# Patient Record
Sex: FEMALE | URBAN - METROPOLITAN AREA
[De-identification: names, ages, dates, MRNs, and addresses within clinical notes are randomized per-mention and may not be internally consistent; named-entity substitution may affect disease eponyms.]

---

## 2020-04-29 ENCOUNTER — EMERGENCY (EMERGENCY)
Facility: HOSPITAL | Age: 45
LOS: 1 days | Discharge: HOME | End: 2020-04-29
Attending: EMERGENCY MEDICINE | Admitting: EMERGENCY MEDICINE
Payer: MEDICAID

## 2020-04-29 VITALS
SYSTOLIC BLOOD PRESSURE: 121 MMHG | RESPIRATION RATE: 16 BRPM | DIASTOLIC BLOOD PRESSURE: 74 MMHG | OXYGEN SATURATION: 100 % | HEART RATE: 75 BPM

## 2020-04-29 VITALS
SYSTOLIC BLOOD PRESSURE: 145 MMHG | TEMPERATURE: 99 F | HEART RATE: 79 BPM | DIASTOLIC BLOOD PRESSURE: 54 MMHG | RESPIRATION RATE: 16 BRPM | OXYGEN SATURATION: 100 %

## 2020-04-29 LAB
ALBUMIN SERPL ELPH-MCNC: 4.1 G/DL — SIGNIFICANT CHANGE UP (ref 3.3–5)
ALP SERPL-CCNC: 56 U/L — SIGNIFICANT CHANGE UP (ref 40–120)
ALT FLD-CCNC: 32 U/L — SIGNIFICANT CHANGE UP (ref 4–33)
ANION GAP SERPL CALC-SCNC: 11 MMO/L — SIGNIFICANT CHANGE UP (ref 7–14)
APPEARANCE UR: CLEAR — SIGNIFICANT CHANGE UP
AST SERPL-CCNC: 24 U/L — SIGNIFICANT CHANGE UP (ref 4–32)
BACTERIA # UR AUTO: NEGATIVE — SIGNIFICANT CHANGE UP
BASOPHILS # BLD AUTO: 0.06 K/UL — SIGNIFICANT CHANGE UP (ref 0–0.2)
BASOPHILS NFR BLD AUTO: 0.7 % — SIGNIFICANT CHANGE UP (ref 0–2)
BILIRUB SERPL-MCNC: 0.3 MG/DL — SIGNIFICANT CHANGE UP (ref 0.2–1.2)
BILIRUB UR-MCNC: NEGATIVE — SIGNIFICANT CHANGE UP
BLOOD UR QL VISUAL: SIGNIFICANT CHANGE UP
BUN SERPL-MCNC: 16 MG/DL — SIGNIFICANT CHANGE UP (ref 7–23)
CALCIUM SERPL-MCNC: 9.2 MG/DL — SIGNIFICANT CHANGE UP (ref 8.4–10.5)
CHLORIDE SERPL-SCNC: 104 MMOL/L — SIGNIFICANT CHANGE UP (ref 98–107)
CO2 SERPL-SCNC: 23 MMOL/L — SIGNIFICANT CHANGE UP (ref 22–31)
COLOR SPEC: YELLOW — SIGNIFICANT CHANGE UP
CREAT SERPL-MCNC: 0.6 MG/DL — SIGNIFICANT CHANGE UP (ref 0.5–1.3)
EOSINOPHIL # BLD AUTO: 0.22 K/UL — SIGNIFICANT CHANGE UP (ref 0–0.5)
EOSINOPHIL NFR BLD AUTO: 2.6 % — SIGNIFICANT CHANGE UP (ref 0–6)
GLUCOSE SERPL-MCNC: 112 MG/DL — HIGH (ref 70–99)
GLUCOSE UR-MCNC: NEGATIVE — SIGNIFICANT CHANGE UP
HCG UR-SCNC: NEGATIVE — SIGNIFICANT CHANGE UP
HCT VFR BLD CALC: 39.2 % — SIGNIFICANT CHANGE UP (ref 34.5–45)
HGB BLD-MCNC: 12.9 G/DL — SIGNIFICANT CHANGE UP (ref 11.5–15.5)
HYALINE CASTS # UR AUTO: NEGATIVE — SIGNIFICANT CHANGE UP
IMM GRANULOCYTES NFR BLD AUTO: 0.4 % — SIGNIFICANT CHANGE UP (ref 0–1.5)
KETONES UR-MCNC: NEGATIVE — SIGNIFICANT CHANGE UP
LEUKOCYTE ESTERASE UR-ACNC: NEGATIVE — SIGNIFICANT CHANGE UP
LIDOCAIN IGE QN: 23.6 U/L — SIGNIFICANT CHANGE UP (ref 7–60)
LYMPHOCYTES # BLD AUTO: 2.19 K/UL — SIGNIFICANT CHANGE UP (ref 1–3.3)
LYMPHOCYTES # BLD AUTO: 26 % — SIGNIFICANT CHANGE UP (ref 13–44)
MCHC RBC-ENTMCNC: 28.5 PG — SIGNIFICANT CHANGE UP (ref 27–34)
MCHC RBC-ENTMCNC: 32.9 % — SIGNIFICANT CHANGE UP (ref 32–36)
MCV RBC AUTO: 86.5 FL — SIGNIFICANT CHANGE UP (ref 80–100)
MONOCYTES # BLD AUTO: 0.64 K/UL — SIGNIFICANT CHANGE UP (ref 0–0.9)
MONOCYTES NFR BLD AUTO: 7.6 % — SIGNIFICANT CHANGE UP (ref 2–14)
NEUTROPHILS # BLD AUTO: 5.27 K/UL — SIGNIFICANT CHANGE UP (ref 1.8–7.4)
NEUTROPHILS NFR BLD AUTO: 62.7 % — SIGNIFICANT CHANGE UP (ref 43–77)
NITRITE UR-MCNC: NEGATIVE — SIGNIFICANT CHANGE UP
NRBC # FLD: 0 K/UL — SIGNIFICANT CHANGE UP (ref 0–0)
PH UR: 6 — SIGNIFICANT CHANGE UP (ref 5–8)
PLATELET # BLD AUTO: 244 K/UL — SIGNIFICANT CHANGE UP (ref 150–400)
PMV BLD: 10.4 FL — SIGNIFICANT CHANGE UP (ref 7–13)
POTASSIUM SERPL-MCNC: 4.2 MMOL/L — SIGNIFICANT CHANGE UP (ref 3.5–5.3)
POTASSIUM SERPL-SCNC: 4.2 MMOL/L — SIGNIFICANT CHANGE UP (ref 3.5–5.3)
PROT SERPL-MCNC: 7.9 G/DL — SIGNIFICANT CHANGE UP (ref 6–8.3)
PROT UR-MCNC: 10 — SIGNIFICANT CHANGE UP
RBC # BLD: 4.53 M/UL — SIGNIFICANT CHANGE UP (ref 3.8–5.2)
RBC # FLD: 15.5 % — HIGH (ref 10.3–14.5)
RBC CASTS # UR COMP ASSIST: SIGNIFICANT CHANGE UP (ref 0–?)
SODIUM SERPL-SCNC: 138 MMOL/L — SIGNIFICANT CHANGE UP (ref 135–145)
SP GR SPEC: 1.02 — SIGNIFICANT CHANGE UP (ref 1–1.04)
SQUAMOUS # UR AUTO: SIGNIFICANT CHANGE UP
UROBILINOGEN FLD QL: NORMAL — SIGNIFICANT CHANGE UP
WBC # BLD: 8.41 K/UL — SIGNIFICANT CHANGE UP (ref 3.8–10.5)
WBC # FLD AUTO: 8.41 K/UL — SIGNIFICANT CHANGE UP (ref 3.8–10.5)
WBC UR QL: SIGNIFICANT CHANGE UP (ref 0–?)

## 2020-04-29 PROCEDURE — 74177 CT ABD & PELVIS W/CONTRAST: CPT | Mod: 26

## 2020-04-29 PROCEDURE — 99284 EMERGENCY DEPT VISIT MOD MDM: CPT

## 2020-04-29 RX ORDER — KETOROLAC TROMETHAMINE 30 MG/ML
15 SYRINGE (ML) INJECTION ONCE
Refills: 0 | Status: DISCONTINUED | OUTPATIENT
Start: 2020-04-29 | End: 2020-04-29

## 2020-04-29 RX ORDER — SODIUM CHLORIDE 9 MG/ML
1000 INJECTION INTRAMUSCULAR; INTRAVENOUS; SUBCUTANEOUS ONCE
Refills: 0 | Status: COMPLETED | OUTPATIENT
Start: 2020-04-29 | End: 2020-04-29

## 2020-04-29 RX ORDER — ONDANSETRON 8 MG/1
4 TABLET, FILM COATED ORAL ONCE
Refills: 0 | Status: COMPLETED | OUTPATIENT
Start: 2020-04-29 | End: 2020-04-29

## 2020-04-29 RX ADMIN — Medication 15 MILLIGRAM(S): at 11:11

## 2020-04-29 RX ADMIN — SODIUM CHLORIDE 1000 MILLILITER(S): 9 INJECTION INTRAMUSCULAR; INTRAVENOUS; SUBCUTANEOUS at 11:10

## 2020-04-29 RX ADMIN — ONDANSETRON 4 MILLIGRAM(S): 8 TABLET, FILM COATED ORAL at 11:11

## 2020-04-29 NOTE — ED ADULT NURSE NOTE - OBJECTIVE STATEMENT
patient c/o right flank pain since last Thursday, h/o a kidney stone in the past. Nausea and vomited this morning. States pain is 9\10.

## 2020-04-29 NOTE — ED PROVIDER NOTE - NSFOLLOWUPINSTRUCTIONS_ED_ALL_ED_FT
You were seen for back/abdominal pain.  You have kidney stones.    Please drink plenty of water as this will help with passing the stones.    Take ibuprofen (Motrin or Advil) 600mg (3 tablets) every 6 hours as needed for pain. Make sure you always take ibuprofen with food or milk.  You can also take tylenol 1000mg every 6-8 hours with the motrin as needed.    Increase your fluid intake, but avoid alcohol.     Return to this Emergency Department if you experience worsening pain, fever, or any other emergency.

## 2020-04-29 NOTE — ED PROVIDER NOTE - PHYSICAL EXAMINATION
Well appearing, well nourished, awake, alert, oriented to person, place, time/situation and in pain distress.    Airway patent    Eyes without scleral injection. No jaundice.    Strong pulse.     Respirations unlabored. Lungs CTA.    Abdomen soft, non-tender, no guarding.    Spine appears normal, range of motion is not limited, no muscle or joint tenderness. SLR neg. (no pain radiating down leg, but does cause back pain when lifting R leg or lowering L leg).    Alert and oriented, no gross motor or sensory deficits. 5/5 strength (B) LE.    Skin normal color for race, warm, dry and intact. No evidence of rash.    No SI/HI. Well appearing, well nourished, awake, alert, oriented to person, place, time/situation and in pain distress.    Airway patent    Eyes without scleral injection. No jaundice.    Strong pulse.     Respirations unlabored. Lungs CTA.    Abdomen soft, non-tender, no guarding. No CVA tenderness b/l.    Spine appears normal, range of motion is not limited, no muscle or joint tenderness. SLR neg. (no pain radiating down leg, but does cause back pain when lifting R leg or lowering L leg).    Alert and oriented, no gross motor or sensory deficits. 5/5 strength (B) LE.    Skin normal color for race, warm, dry and intact. No evidence of rash.    No SI/HI.

## 2020-04-29 NOTE — ED PROVIDER NOTE - PROGRESS NOTE DETAILS
Yosef PGY1 - Reassessed pt., who is feeling better.  Discussed results w/ pt., who understands them.  All other questions and concerns have been addressed.  Pt. will be discharged.

## 2020-04-29 NOTE — ED PROVIDER NOTE - ATTENDING CONTRIBUTION TO CARE
I performed a face-to-face evaluation of the patient and performed a history and physical examination. I agree with the history and physical examination.    Bernice: Severe R LBP x 1 wk, cloudy urine. DDx includes sciatica, pyelo (had recent UTI), muscle strain, renal stone (has h/o small stone). Plan: UA, labs, CT, pain control.

## 2020-04-29 NOTE — ED PROVIDER NOTE - CARE PLAN
Principal Discharge DX:	Back pain Principal Discharge DX:	Back pain  Secondary Diagnosis:	Nephrolithiasis

## 2020-04-29 NOTE — ED ADULT TRIAGE NOTE - CHIEF COMPLAINT QUOTE
Pt c/o R flank pain and diarrhea x1 week, today worsening and radiating to R pelvic area, also c/o cloudy urine, +nausea, no vomiting. No fevers/ dysuria/ cough/ SOB. Appears uncomfortable.

## 2020-04-29 NOTE — ED PROVIDER NOTE - CLINICAL SUMMARY MEDICAL DECISION MAKING FREE TEXT BOX
Bernice: Severe R LBP x 1 wk, cloudy urine. DDx includes sciatica, pyelo (had recent UTI), muscle strain, renal stone (has h/o small stone). Plan: UA, labs, CT, pain control.

## 2020-04-29 NOTE — ED PROVIDER NOTE - PATIENT PORTAL LINK FT
You can access the FollowMyHealth Patient Portal offered by [facility] by registering at the following website: [email or web address]. By joining [employer]’s FollowMyHealth portal, you will also be able to view your health information using other applications (apps) compatible with our system.

## 2020-04-29 NOTE — ED ADULT NURSE REASSESSMENT NOTE - NS ED NURSE REASSESS COMMENT FT1
Patient awake and alert, c/o right sided flank pain since last Thursday. IV  placed, labs sent, medicated as ordered, NS in fusing well, awaiting ct scan. Will continue to monitor.

## 2020-04-29 NOTE — ED PROVIDER NOTE - OBJECTIVE STATEMENT
Bernice: Constant, sharp, R lower back pain and diarrhea (non-bloody, yesterday and today) x 1 week, today worse and radiating to R pelvic area (not down leg). Cloudy urine, N, no V. No F. This started last week after stretching/straining her back. Needing wheelchair and help walking 2/2 pain (not weakness). No dysuria/cough/SOB. Treated for UTI in 2/20. Failing heating pads and Motrin 400 mg - 600 mg; gets more comfortable, but not able to walk well 2/2 pain. When pain was severe, had sweating.    Pt. has h/o ovarian cyst (removed, returned, removed again), and an asymptomatic kidney stone. This pain is not like the cyst pain.

## 2020-04-29 NOTE — ED ADULT NURSE NOTE - NSIMPLEMENTINTERV_GEN_ALL_ED
Implemented All Universal Safety Interventions:  [place] to call system. Call bell, personal items and telephone within reach. Instruct patient to call for assistance. Room bathroom lighting operational. Non-slip footwear when patient is off stretcher. Physically safe environment: no spills, clutter or unnecessary equipment. Stretcher in lowest position, wheels locked, appropriate side rails in place.

## 2020-04-30 LAB
CULTURE RESULTS: SIGNIFICANT CHANGE UP
SPECIMEN SOURCE: SIGNIFICANT CHANGE UP

## 2020-05-02 ENCOUNTER — EMERGENCY (EMERGENCY)
Facility: HOSPITAL | Age: 45
LOS: 1 days | Discharge: HOME | End: 2020-05-02
Attending: EMERGENCY MEDICINE | Admitting: EMERGENCY MEDICINE
Payer: MEDICAID

## 2020-05-02 VITALS
OXYGEN SATURATION: 100 % | DIASTOLIC BLOOD PRESSURE: 86 MMHG | RESPIRATION RATE: 16 BRPM | HEART RATE: 112 BPM | SYSTOLIC BLOOD PRESSURE: 117 MMHG | TEMPERATURE: 98 F

## 2020-05-02 LAB
ALBUMIN SERPL ELPH-MCNC: 4.3 G/DL — SIGNIFICANT CHANGE UP (ref 3.3–5)
ALP SERPL-CCNC: 54 U/L — SIGNIFICANT CHANGE UP (ref 40–120)
ALT FLD-CCNC: 29 U/L — SIGNIFICANT CHANGE UP (ref 4–33)
ANION GAP SERPL CALC-SCNC: 12 MMO/L — SIGNIFICANT CHANGE UP (ref 7–14)
APPEARANCE UR: CLEAR — SIGNIFICANT CHANGE UP
AST SERPL-CCNC: 21 U/L — SIGNIFICANT CHANGE UP (ref 4–32)
BASE EXCESS BLDV CALC-SCNC: -0.9 MMOL/L — SIGNIFICANT CHANGE UP
BASOPHILS # BLD AUTO: 0.07 K/UL — SIGNIFICANT CHANGE UP (ref 0–0.2)
BASOPHILS NFR BLD AUTO: 0.8 % — SIGNIFICANT CHANGE UP (ref 0–2)
BILIRUB SERPL-MCNC: 0.3 MG/DL — SIGNIFICANT CHANGE UP (ref 0.2–1.2)
BILIRUB UR-MCNC: NEGATIVE — SIGNIFICANT CHANGE UP
BLOOD GAS VENOUS - CREATININE: 0.7 MG/DL — SIGNIFICANT CHANGE UP (ref 0.5–1.3)
BLOOD UR QL VISUAL: SIGNIFICANT CHANGE UP
BUN SERPL-MCNC: 13 MG/DL — SIGNIFICANT CHANGE UP (ref 7–23)
CALCIUM SERPL-MCNC: 9.3 MG/DL — SIGNIFICANT CHANGE UP (ref 8.4–10.5)
CHLORIDE BLDV-SCNC: 110 MMOL/L — HIGH (ref 96–108)
CHLORIDE SERPL-SCNC: 103 MMOL/L — SIGNIFICANT CHANGE UP (ref 98–107)
CO2 SERPL-SCNC: 21 MMOL/L — LOW (ref 22–31)
COLOR SPEC: SIGNIFICANT CHANGE UP
CREAT SERPL-MCNC: 0.62 MG/DL — SIGNIFICANT CHANGE UP (ref 0.5–1.3)
CRP SERPL-MCNC: < 4 MG/L — SIGNIFICANT CHANGE UP
EOSINOPHIL # BLD AUTO: 0.38 K/UL — SIGNIFICANT CHANGE UP (ref 0–0.5)
EOSINOPHIL NFR BLD AUTO: 4.4 % — SIGNIFICANT CHANGE UP (ref 0–6)
ERYTHROCYTE [SEDIMENTATION RATE] IN BLOOD: 10 MM/HR — SIGNIFICANT CHANGE UP (ref 4–25)
ERYTHROCYTE [SEDIMENTATION RATE] IN BLOOD: 11 MM/HR — SIGNIFICANT CHANGE UP (ref 4–25)
GAS PNL BLDV: 137 MMOL/L — SIGNIFICANT CHANGE UP (ref 136–146)
GLUCOSE BLDV-MCNC: 95 MG/DL — SIGNIFICANT CHANGE UP (ref 70–99)
GLUCOSE SERPL-MCNC: 96 MG/DL — SIGNIFICANT CHANGE UP (ref 70–99)
GLUCOSE UR-MCNC: NEGATIVE — SIGNIFICANT CHANGE UP
HCO3 BLDV-SCNC: 22 MMOL/L — SIGNIFICANT CHANGE UP (ref 20–27)
HCT VFR BLD CALC: 39.3 % — SIGNIFICANT CHANGE UP (ref 34.5–45)
HCT VFR BLDV CALC: 41.7 % — SIGNIFICANT CHANGE UP (ref 34.5–45)
HGB BLD-MCNC: 12.9 G/DL — SIGNIFICANT CHANGE UP (ref 11.5–15.5)
HGB BLDV-MCNC: 13.6 G/DL — SIGNIFICANT CHANGE UP (ref 11.5–15.5)
IMM GRANULOCYTES NFR BLD AUTO: 0.2 % — SIGNIFICANT CHANGE UP (ref 0–1.5)
KETONES UR-MCNC: NEGATIVE — SIGNIFICANT CHANGE UP
LACTATE BLDV-MCNC: 1.8 MMOL/L — SIGNIFICANT CHANGE UP (ref 0.5–2)
LEUKOCYTE ESTERASE UR-ACNC: NEGATIVE — SIGNIFICANT CHANGE UP
LIDOCAIN IGE QN: 26.7 U/L — SIGNIFICANT CHANGE UP (ref 7–60)
LYMPHOCYTES # BLD AUTO: 3.02 K/UL — SIGNIFICANT CHANGE UP (ref 1–3.3)
LYMPHOCYTES # BLD AUTO: 35.2 % — SIGNIFICANT CHANGE UP (ref 13–44)
MCHC RBC-ENTMCNC: 28.7 PG — SIGNIFICANT CHANGE UP (ref 27–34)
MCHC RBC-ENTMCNC: 32.8 % — SIGNIFICANT CHANGE UP (ref 32–36)
MCV RBC AUTO: 87.5 FL — SIGNIFICANT CHANGE UP (ref 80–100)
MONOCYTES # BLD AUTO: 0.77 K/UL — SIGNIFICANT CHANGE UP (ref 0–0.9)
MONOCYTES NFR BLD AUTO: 9 % — SIGNIFICANT CHANGE UP (ref 2–14)
NEUTROPHILS # BLD AUTO: 4.31 K/UL — SIGNIFICANT CHANGE UP (ref 1.8–7.4)
NEUTROPHILS NFR BLD AUTO: 50.4 % — SIGNIFICANT CHANGE UP (ref 43–77)
NITRITE UR-MCNC: NEGATIVE — SIGNIFICANT CHANGE UP
NRBC # FLD: 0 K/UL — SIGNIFICANT CHANGE UP (ref 0–0)
PCO2 BLDV: 50 MMHG — SIGNIFICANT CHANGE UP (ref 41–51)
PH BLDV: 7.31 PH — LOW (ref 7.32–7.43)
PH UR: 6 — SIGNIFICANT CHANGE UP (ref 5–8)
PLATELET # BLD AUTO: 276 K/UL — SIGNIFICANT CHANGE UP (ref 150–400)
PMV BLD: 11.1 FL — SIGNIFICANT CHANGE UP (ref 7–13)
PO2 BLDV: 31 MMHG — LOW (ref 35–40)
POTASSIUM BLDV-SCNC: 3.6 MMOL/L — SIGNIFICANT CHANGE UP (ref 3.4–4.5)
POTASSIUM SERPL-MCNC: 3.6 MMOL/L — SIGNIFICANT CHANGE UP (ref 3.5–5.3)
POTASSIUM SERPL-SCNC: 3.6 MMOL/L — SIGNIFICANT CHANGE UP (ref 3.5–5.3)
PROT SERPL-MCNC: 8.1 G/DL — SIGNIFICANT CHANGE UP (ref 6–8.3)
PROT UR-MCNC: NEGATIVE — SIGNIFICANT CHANGE UP
RBC # BLD: 4.49 M/UL — SIGNIFICANT CHANGE UP (ref 3.8–5.2)
RBC # FLD: 15.4 % — HIGH (ref 10.3–14.5)
SAO2 % BLDV: 48.8 % — LOW (ref 60–85)
SODIUM SERPL-SCNC: 136 MMOL/L — SIGNIFICANT CHANGE UP (ref 135–145)
SP GR SPEC: 1.02 — SIGNIFICANT CHANGE UP (ref 1–1.04)
UROBILINOGEN FLD QL: NORMAL — SIGNIFICANT CHANGE UP
WBC # BLD: 8.57 K/UL — SIGNIFICANT CHANGE UP (ref 3.8–10.5)
WBC # FLD AUTO: 8.57 K/UL — SIGNIFICANT CHANGE UP (ref 3.8–10.5)

## 2020-05-02 PROCEDURE — 99285 EMERGENCY DEPT VISIT HI MDM: CPT

## 2020-05-02 RX ORDER — LIDOCAINE 4 G/100G
1 CREAM TOPICAL ONCE
Refills: 0 | Status: COMPLETED | OUTPATIENT
Start: 2020-05-02 | End: 2020-05-02

## 2020-05-02 RX ORDER — SODIUM CHLORIDE 9 MG/ML
1000 INJECTION INTRAMUSCULAR; INTRAVENOUS; SUBCUTANEOUS ONCE
Refills: 0 | Status: COMPLETED | OUTPATIENT
Start: 2020-05-02 | End: 2020-05-02

## 2020-05-02 RX ORDER — KETOROLAC TROMETHAMINE 30 MG/ML
30 SYRINGE (ML) INJECTION ONCE
Refills: 0 | Status: DISCONTINUED | OUTPATIENT
Start: 2020-05-02 | End: 2020-05-02

## 2020-05-02 RX ORDER — CYCLOBENZAPRINE HYDROCHLORIDE 10 MG/1
1 TABLET, FILM COATED ORAL
Qty: 15 | Refills: 0
Start: 2020-05-02 | End: 2020-05-06

## 2020-05-02 RX ORDER — OXYCODONE AND ACETAMINOPHEN 5; 325 MG/1; MG/1
1 TABLET ORAL ONCE
Refills: 0 | Status: DISCONTINUED | OUTPATIENT
Start: 2020-05-02 | End: 2020-05-02

## 2020-05-02 RX ORDER — IBUPROFEN 200 MG
1 TABLET ORAL
Qty: 15 | Refills: 0
Start: 2020-05-02 | End: 2020-05-06

## 2020-05-02 RX ORDER — MORPHINE SULFATE 50 MG/1
4 CAPSULE, EXTENDED RELEASE ORAL ONCE
Refills: 0 | Status: DISCONTINUED | OUTPATIENT
Start: 2020-05-02 | End: 2020-05-02

## 2020-05-02 RX ORDER — OXYCODONE AND ACETAMINOPHEN 5; 325 MG/1; MG/1
1 TABLET ORAL
Qty: 12 | Refills: 0
Start: 2020-05-02 | End: 2020-05-04

## 2020-05-02 RX ORDER — DEXAMETHASONE 0.5 MG/5ML
10 ELIXIR ORAL ONCE
Refills: 0 | Status: COMPLETED | OUTPATIENT
Start: 2020-05-02 | End: 2020-05-02

## 2020-05-02 RX ADMIN — OXYCODONE AND ACETAMINOPHEN 1 TABLET(S): 5; 325 TABLET ORAL at 14:32

## 2020-05-02 RX ADMIN — Medication 30 MILLIGRAM(S): at 12:31

## 2020-05-02 RX ADMIN — LIDOCAINE 1 PATCH: 4 CREAM TOPICAL at 13:22

## 2020-05-02 RX ADMIN — Medication 102 MILLIGRAM(S): at 14:53

## 2020-05-02 RX ADMIN — SODIUM CHLORIDE 1000 MILLILITER(S): 9 INJECTION INTRAMUSCULAR; INTRAVENOUS; SUBCUTANEOUS at 12:31

## 2020-05-02 RX ADMIN — MORPHINE SULFATE 4 MILLIGRAM(S): 50 CAPSULE, EXTENDED RELEASE ORAL at 12:31

## 2020-05-02 NOTE — ED PROVIDER NOTE - PATIENT PORTAL LINK FT
You can access the FollowMyHealth Patient Portal offered by [facility] by registering at the following website: [email or web address]. By joining [employer]’s FollowMyHealth portal, you will also be able to view your health information using other applications (apps) compatible with our system.

## 2020-05-02 NOTE — ED PROVIDER NOTE - CARDIAC, MLM
[facility]  *** FINAL REPORT ***    Name: Lynnette Ferro  MRN: [id number]    Outpatient  : 20 Aug 1952  HIS Order #: [id number]  [id number] [facility] Visit #: [id number]  Date: 2018    TYPE OF TEST: Visceral Arterial Duplex    REASON FOR TEST  Eval for renal vascular HTN    Aortic PSV:  66.0 cm/s  Diameter AP: 2.0 cm   TV: 2.0 cm                   Right          Left  Renal Artery:- -------------  -------------  Proximal  PSV:  77.0           67.0  Mid       PSV: 120.0           65.0  Distal    PSV:  75.0          106.0  Aortic ratio :   1.8            1.6    Medullary PSV:  40.9           43.8            EDV:  12.8           13.6            EDR:   0.3            0.3            SDR:   3.2            3.2    Cortical  PSV:  31.7           17.6            EDV:  10.2            6.7            EDR:   0.3            0.4            SDR:   3.1            2.6  Stenosis:      Normal         Normal  Kidney size:   10.8 cm        10.4 cm               x  5.2 cm      x  4.2 cm    Hilar:-        Right          Left  Acc. Time  AT:  40 secs        30 secs  Acc. Index AI:             RI: 0.68           0.69    INTERPRETATION/FINDINGS  Duplex images were obtained using 2-D gray scale, color flow and  spectral doppler analysis. RENAL:  1. No significant renal artery stenosis identified, both renal  arteries and veins visualized. 2. The right kidney measures 10.8 x 5.2 x 5.2cm  3. The left kidney measures 10.4x 4.2 x 4.2  cm. 4.  Non-obstructive right lower pole renal calculi  5. Atheromatous disease of the Abdominal Aorta. ADDITIONAL COMMENTS    I have personally reviewed the data relevant to the interpretation of  this  study. TECHNOLOGIST: Eva Haider  Signed: 2018 01:19 PM    PHYSICIAN: Nima England.  Bob Armijo MD  Signed: 2018 01:46 PM Normal rate, regular rhythm.  Heart sounds S1, S2.  No murmurs, rubs or gallops.

## 2020-05-02 NOTE — ED PROVIDER NOTE - CLINICAL SUMMARY MEDICAL DECISION MAKING FREE TEXT BOX
46 y/o F with hx of kidney stones presenting with flank pain. Will obtain US kidneys, labs, UA, pain control and reassess.

## 2020-05-02 NOTE — ED PROVIDER NOTE - CARE PLAN
Principal Discharge DX:	Kidney stones Principal Discharge DX:	Low back pain  Secondary Diagnosis:	Kidney stones

## 2020-05-02 NOTE — ED PROVIDER NOTE - OBJECTIVE STATEMENT
44 y/o F with hx of kidney stones presenting to the ED c/o worsening right sided flank pain. Took Tylenol and Ibuprofen 8:30am this morning with no relief. Pt is tearful and c/o constant sharp non-radiating pain. Denies fever, chills, n/v/d, bloody stools.

## 2020-05-02 NOTE — ED POST DISCHARGE NOTE - DETAILS
Spoke with pt who is c/o worsening flank pain despite taking ibuprofen/tylenol. Pt informed ucx and need to return to ED for evaluation ( repeat ucx) and treatment. Pt expresses understanding of this information and states will return to ED now Spoke with pt who is c/o worsening flank pain despite taking ibuprofen/tylenol. Pt informed ucx results and need to return to ED for evaluation ( repeat ucx) and treatment. Pt expresses understanding of this information and states will return to ED now

## 2020-05-02 NOTE — ED PROVIDER NOTE - SKIN, MLM
POST-OP DIAGNOSIS:  Fracture of shaft of fifth metacarpal bone of right hand 12-Jul-2019 08:35:46  Jie Davila Skin normal color for race, warm, dry and intact. No evidence of rash.

## 2020-05-02 NOTE — ED ADULT NURSE NOTE - OBJECTIVE STATEMENT
a&ox4, came in c/o rt side flank pain radiating to rt side abd and rt groin.  pain meds give and pain went down to 6/10 from 10/10,  steady on her feet. resting in bed

## 2020-05-02 NOTE — ED PROVIDER NOTE - ATTENDING CONTRIBUTION TO CARE
I performed a face-to-face evaluation of the patient and performed a history and physical examination. I agree with the history and physical examination.    Seen here by me 3 days ago for abd. pain. Labs and CT w/u showed only (B) ~0.5 cm non-obstructing, intra-renal stones. UA unremarkable. UCx grew <10,000 CFU or normal genital moody. Still has pain. No F/V. Will repeat labs and UA. Pain control. I performed a face-to-face evaluation of the patient and performed a history and physical examination. I agree with the history and physical examination.    Seen here by me 3 days ago for R lower back pain radiating to RLQ. Labs and CT w/u showed only (B) ~0.5 cm non-obstructing, intra-renal stones. UA unremarkable. UCx grew <10,000 CFU or normal genital moody. Still has pain in that area, not likely explained by intra-renal stones, since her pain is lower and the stones are higher and non-obstructive. No F/V. This may be MSK back pain. Will repeat labs and UA. Pain control for back pain.

## 2020-05-02 NOTE — ED ADULT TRIAGE NOTE - CHIEF COMPLAINT QUOTE
pt seen thursday and returns due to kidney stone pain/ pt was told to return.  pt uncomfortable at triage   c/o rt side abd pain

## 2020-05-03 LAB
CULTURE RESULTS: SIGNIFICANT CHANGE UP
SPECIMEN SOURCE: SIGNIFICANT CHANGE UP

## 2020-05-03 RX ORDER — OXYCODONE AND ACETAMINOPHEN 5; 325 MG/1; MG/1
1 TABLET ORAL
Qty: 12 | Refills: 0
Start: 2020-05-03 | End: 2020-05-05

## 2020-05-03 NOTE — ED POST DISCHARGE NOTE - REASON FOR FOLLOW-UP
Patient called Rx for percocet not at pharmacy. Prescription writer RX for percocet failed. Re submitted to patient's pharmacy. Other

## 2020-07-01 ENCOUNTER — RESULT REVIEW (OUTPATIENT)
Age: 45
End: 2020-07-01

## 2020-07-01 PROBLEM — Z00.00 ENCOUNTER FOR PREVENTIVE HEALTH EXAMINATION: Status: ACTIVE | Noted: 2020-07-01

## 2020-07-01 PROBLEM — F41.9 ANXIETY: Status: ACTIVE | Noted: 2020-07-01

## 2020-07-01 RX ORDER — AMLODIPINE BESYLATE 5 MG/1
5 TABLET ORAL
Refills: 0 | Status: ACTIVE | COMMUNITY

## 2020-07-02 ENCOUNTER — APPOINTMENT (OUTPATIENT)
Dept: ENDOVASCULAR SURGERY | Facility: CLINIC | Age: 45
End: 2020-07-02
Payer: MEDICAID

## 2020-07-02 DIAGNOSIS — F41.9 ANXIETY DISORDER, UNSPECIFIED: ICD-10-CM

## 2020-07-02 PROCEDURE — 77001 FLUOROGUIDE FOR VEIN DEVICE: CPT

## 2020-07-02 PROCEDURE — 76937 US GUIDE VASCULAR ACCESS: CPT

## 2020-07-02 PROCEDURE — 36561 INSERT TUNNELED CV CATH: CPT

## 2020-07-02 NOTE — PROCEDURE
[D/C IV on discharge] : D/C IV on discharge [Resume diet] : resume diet [Site check for bleeding/hematoma] : Site check for bleeding/hematoma [Vital signs on admission the q 15 mins x2] : Vital signs on admission the q 15 mins x2 [FreeTextEntry1] : Mediport Placement [FreeTextEntry2] : chemotherapy

## 2020-07-02 NOTE — PAST MEDICAL HISTORY
[Increasing age ( >40 years old)] : Increasing age ( >40 years old) [Malignancy] : Malignancy [No therapy indicated for cases scheduled for less than one hour] : No therapy indicated for cases scheduled for less than one hour. [FreeTextEntry1] : \par \par Malignant Hyperthermis (MH) Screening Tool and Risk of Bleeding Assessement\par Ms. MARIA LUISA TANG  denies family history of unexpected death following Anesthesia or Exercise.\par Denies Family history of Malignant Hyperthermia, Muscle or Neuromuscular disorder and High Temperature following exercise.\par \par Ms. MARIA LUISA TANG denies history of Muscle Spasm, Dark or Chocolate - Colored urine and Unanticipated fever immediately following anesthesia or serious exercise. \par Ms. TANG  also denies bleeding tendencies/ Risks of Bleeding\par

## 2020-07-02 NOTE — ASSESSMENT
[FreeTextEntry1] : Pt seen and assessed for placement of port for venous access.  Chart reviewed, imaging and labs evaluated and acceptable for intervention. Consent obtained with risks, benefits explained.  Will place port with sedation.\par \par 6 [place]oruba powerport placed using Us and fluoro guidance - tip in svc.   EBL=minimal.  May use immediately- full report to follow\par

## 2020-08-18 ENCOUNTER — INPATIENT (INPATIENT)
Facility: HOSPITAL | Age: 45
LOS: 5 days | Discharge: HOME | DRG: 300 | End: 2020-08-24
Attending: STUDENT IN AN ORGANIZED HEALTH CARE EDUCATION/TRAINING PROGRAM | Admitting: HOSPITALIST
Payer: MEDICAID

## 2020-08-18 VITALS
HEIGHT: 63 IN | TEMPERATURE: 99 F | DIASTOLIC BLOOD PRESSURE: 69 MMHG | RESPIRATION RATE: 18 BRPM | OXYGEN SATURATION: 98 % | HEART RATE: 104 BPM | WEIGHT: 164.91 LBS | SYSTOLIC BLOOD PRESSURE: 110 MMHG

## 2020-08-18 LAB
ALBUMIN SERPL ELPH-MCNC: 4.4 G/DL — SIGNIFICANT CHANGE UP (ref 3.3–5)
ALP SERPL-CCNC: 117 U/L — SIGNIFICANT CHANGE UP (ref 40–120)
ALT FLD-CCNC: 11 U/L — SIGNIFICANT CHANGE UP (ref 10–45)
ANION GAP SERPL CALC-SCNC: 11 MMOL/L — SIGNIFICANT CHANGE UP (ref 5–17)
AST SERPL-CCNC: 16 U/L — SIGNIFICANT CHANGE UP (ref 10–40)
BASE EXCESS BLDV CALC-SCNC: 3.1 MMOL/L — HIGH (ref -2–2)
BASOPHILS # BLD AUTO: 0.21 K/UL — HIGH (ref 0–0.2)
BASOPHILS NFR BLD AUTO: 1 % — SIGNIFICANT CHANGE UP (ref 0–2)
BILIRUB SERPL-MCNC: 0.1 MG/DL — LOW (ref 0.2–1.2)
BUN SERPL-MCNC: 15 MG/DL — SIGNIFICANT CHANGE UP (ref 7–23)
CA-I SERPL-SCNC: 1.23 MMOL/L — SIGNIFICANT CHANGE UP (ref 1.12–1.3)
CALCIUM SERPL-MCNC: 9.8 MG/DL — SIGNIFICANT CHANGE UP (ref 8.4–10.5)
CHLORIDE BLDV-SCNC: 104 MMOL/L — SIGNIFICANT CHANGE UP (ref 96–108)
CHLORIDE SERPL-SCNC: 104 MMOL/L — SIGNIFICANT CHANGE UP (ref 96–108)
CO2 BLDV-SCNC: 30 MMOL/L — SIGNIFICANT CHANGE UP (ref 22–30)
CO2 SERPL-SCNC: 25 MMOL/L — SIGNIFICANT CHANGE UP (ref 22–31)
CREAT SERPL-MCNC: 0.55 MG/DL — SIGNIFICANT CHANGE UP (ref 0.5–1.3)
EOSINOPHIL # BLD AUTO: 0.21 K/UL — SIGNIFICANT CHANGE UP (ref 0–0.5)
EOSINOPHIL NFR BLD AUTO: 1 % — SIGNIFICANT CHANGE UP (ref 0–6)
GAS PNL BLDV: 142 MMOL/L — SIGNIFICANT CHANGE UP (ref 135–145)
GAS PNL BLDV: SIGNIFICANT CHANGE UP
GIANT PLATELETS BLD QL SMEAR: PRESENT — SIGNIFICANT CHANGE UP
GLUCOSE BLDV-MCNC: 105 MG/DL — HIGH (ref 70–99)
GLUCOSE SERPL-MCNC: 102 MG/DL — HIGH (ref 70–99)
HCG SERPL-ACNC: <2 MIU/ML — SIGNIFICANT CHANGE UP
HCO3 BLDV-SCNC: 28 MMOL/L — SIGNIFICANT CHANGE UP (ref 21–29)
HCT VFR BLD CALC: 34.5 % — SIGNIFICANT CHANGE UP (ref 34.5–45)
HCT VFR BLDA CALC: 34 % — LOW (ref 39–50)
HGB BLD CALC-MCNC: 11 G/DL — LOW (ref 11.5–15.5)
HGB BLD-MCNC: 11 G/DL — LOW (ref 11.5–15.5)
LACTATE BLDV-MCNC: 1.2 MMOL/L — SIGNIFICANT CHANGE UP (ref 0.7–2)
LG PLATELETS BLD QL AUTO: SLIGHT — SIGNIFICANT CHANGE UP
LYMPHOCYTES # BLD AUTO: 12 % — LOW (ref 13–44)
LYMPHOCYTES # BLD AUTO: 2.46 K/UL — SIGNIFICANT CHANGE UP (ref 1–3.3)
MANUAL SMEAR VERIFICATION: SIGNIFICANT CHANGE UP
MCHC RBC-ENTMCNC: 28.6 PG — SIGNIFICANT CHANGE UP (ref 27–34)
MCHC RBC-ENTMCNC: 31.9 GM/DL — LOW (ref 32–36)
MCV RBC AUTO: 89.8 FL — SIGNIFICANT CHANGE UP (ref 80–100)
METAMYELOCYTES # FLD: 4 % — HIGH (ref 0–0)
MONOCYTES # BLD AUTO: 2.67 K/UL — HIGH (ref 0–0.9)
MONOCYTES NFR BLD AUTO: 13 % — SIGNIFICANT CHANGE UP (ref 2–14)
MYELOCYTES NFR BLD: 2 % — HIGH (ref 0–0)
NEUTROPHILS # BLD AUTO: 12.92 K/UL — HIGH (ref 1.8–7.4)
NEUTROPHILS NFR BLD AUTO: 55 % — SIGNIFICANT CHANGE UP (ref 43–77)
NEUTS BAND # BLD: 8 % — SIGNIFICANT CHANGE UP (ref 0–8)
NRBC # BLD: 2 /100 — HIGH (ref 0–0)
PCO2 BLDV: 48 MMHG — SIGNIFICANT CHANGE UP (ref 35–50)
PH BLDV: 7.38 — SIGNIFICANT CHANGE UP (ref 7.35–7.45)
PLAT MORPH BLD: ABNORMAL
PLATELET # BLD AUTO: 296 K/UL — SIGNIFICANT CHANGE UP (ref 150–400)
PO2 BLDV: 29 MMHG — SIGNIFICANT CHANGE UP (ref 25–45)
POTASSIUM BLDV-SCNC: 3.4 MMOL/L — LOW (ref 3.5–5.3)
POTASSIUM SERPL-MCNC: 3.6 MMOL/L — SIGNIFICANT CHANGE UP (ref 3.5–5.3)
POTASSIUM SERPL-SCNC: 3.6 MMOL/L — SIGNIFICANT CHANGE UP (ref 3.5–5.3)
PROMYELOCYTES # FLD: 1 % — HIGH (ref 0–0)
PROT SERPL-MCNC: 7.9 G/DL — SIGNIFICANT CHANGE UP (ref 6–8.3)
RBC # BLD: 3.84 M/UL — SIGNIFICANT CHANGE UP (ref 3.8–5.2)
RBC # FLD: 15.7 % — HIGH (ref 10.3–14.5)
RBC BLD AUTO: SIGNIFICANT CHANGE UP
SAO2 % BLDV: 50 % — LOW (ref 67–88)
SODIUM SERPL-SCNC: 140 MMOL/L — SIGNIFICANT CHANGE UP (ref 135–145)
VARIANT LYMPHS # BLD: 3 % — SIGNIFICANT CHANGE UP (ref 0–6)
WBC # BLD: 20.5 K/UL — HIGH (ref 3.8–10.5)
WBC # FLD AUTO: 20.5 K/UL — HIGH (ref 3.8–10.5)

## 2020-08-18 PROCEDURE — 70491 CT SOFT TISSUE NECK W/DYE: CPT | Mod: 26

## 2020-08-18 PROCEDURE — 99284 EMERGENCY DEPT VISIT MOD MDM: CPT

## 2020-08-18 PROCEDURE — 71260 CT THORAX DX C+: CPT | Mod: 26

## 2020-08-18 RX ORDER — CEFTRIAXONE 500 MG/1
1000 INJECTION, POWDER, FOR SOLUTION INTRAMUSCULAR; INTRAVENOUS ONCE
Refills: 0 | Status: COMPLETED | OUTPATIENT
Start: 2020-08-18 | End: 2020-08-18

## 2020-08-18 RX ORDER — ONDANSETRON 8 MG/1
4 TABLET, FILM COATED ORAL ONCE
Refills: 0 | Status: COMPLETED | OUTPATIENT
Start: 2020-08-18 | End: 2020-08-18

## 2020-08-18 RX ORDER — KETOROLAC TROMETHAMINE 30 MG/ML
15 SYRINGE (ML) INJECTION ONCE
Refills: 0 | Status: DISCONTINUED | OUTPATIENT
Start: 2020-08-18 | End: 2020-08-18

## 2020-08-18 RX ORDER — MORPHINE SULFATE 50 MG/1
4 CAPSULE, EXTENDED RELEASE ORAL ONCE
Refills: 0 | Status: DISCONTINUED | OUTPATIENT
Start: 2020-08-18 | End: 2020-08-18

## 2020-08-18 RX ADMIN — Medication 15 MILLIGRAM(S): at 22:40

## 2020-08-18 RX ADMIN — Medication 15 MILLIGRAM(S): at 18:25

## 2020-08-18 RX ADMIN — MORPHINE SULFATE 4 MILLIGRAM(S): 50 CAPSULE, EXTENDED RELEASE ORAL at 22:42

## 2020-08-18 RX ADMIN — ONDANSETRON 4 MILLIGRAM(S): 8 TABLET, FILM COATED ORAL at 19:29

## 2020-08-18 NOTE — ED PROVIDER NOTE - PROGRESS NOTE DETAILS
Uri Rascon D.O., PGY2 (Resident)  Patient with wbc 20. vbg ordered for lactate. Escobar Cisse MD. vascular consulted Escobar Cisse MD. CTs noted. starting heparin, per vasc recs. also noting possible R lung PNA. starting ceftriaxone. accepted to hospitalist for admission.

## 2020-08-18 NOTE — ED ADULT NURSE REASSESSMENT NOTE - NS ED NURSE REASSESS COMMENT FT1
Report received from LEV [place] in purple. Pt observed sitting up in stretcher conversing with RN without difficulty. Breathing spontaneous and unlabored. Pt updated on plan of care awaiting CT scan. Pt reporting pain relief 4/10 on left side of neck. Left upper chest chemo port noted upon exam.  No acute distress noted. Call bell within reach.

## 2020-08-18 NOTE — CONSULT NOTE ADULT - SUBJECTIVE AND OBJECTIVE BOX
VASCULAR SURGERY CONSULT NOTE  --------------------------------------------------------------------------------------------    HPI:   Patient is a 45y old  Female who presents with a chief complaint of L neck pain    HPI:    45F with history of breast CA diagnosed in June who recently had mediport placement (L chest) and initiation of chemotherapy prior to resection presents with 5 days of left neck pain. Patient states pain was initially dull and she thought she had just slept poorly on her neck. However pain progressed and became shooting pain in her left neck and behind her ear worse with movement and mastication. Additionally, it gives her lots of pain when she throws up which is frequent b/c of the chemo. She informed her oncologist who told her to go to the ED to figure out the cause of the neck pain before they continued with chemotherapy. She has no prior or family history of clotting disorders. No recent trauma to her neck.    Patient denies fevers/chills, denies lightheadedness/dizziness, denies SOB/chest pain, endorses nausea/vomiting (chemo), denies constipation/diarrhea.      ROS: 10-system review is otherwise negative except HPI above.      PAST MEDICAL & SURGICAL HISTORY:  Breast cancer  No significant past surgical history    FAMILY HISTORY:  [x] Family history not pertinent as reviewed with the patient and family    ALLERGIES: No Known Allergies      HOME MEDICATIONS: chemotherapy    --------------------------------------------------------------------------------------------    Vitals:   T(C): 37.3 (08-18-20 @ 22:35), Max: 37.4 (08-18-20 @ 19:30)  HR: 80 (08-18-20 @ 22:35) (74 - 104)  BP: 140/91 (08-18-20 @ 22:35) (110/69 - 141/89)  RR: 18 (08-18-20 @ 22:35) (16 - 18)  SpO2: 98% (08-18-20 @ 22:35) (98% - 100%)  CAPILLARY BLOOD GLUCOSE          Height (cm): 160.02 (08-18 @ 15:50)  Weight (kg): 75.3 (08-18 @ 22:51)  BMI (kg/m2): 29.4 (08-18 @ 22:51)  BSA (m2): 1.79 (08-18 @ 22:51)    PHYSICAL EXAM:    General: tearful  Eyes: EOMI  ENT: airway patent, mild swelling of left neck, tender along SCM and posterior to ear  Cardiac: RRR, well perfused  Resp: non-labored breathing, no use of accessory muscles  Abd: soft, NT, ND, no guarding/rebound  Ext: spont moving all extremities    --------------------------------------------------------------------------------------------    LABS  CBC (08-18 @ 18:30)                              11.0<L>                         20.50<H>  )----------------(  296        55.0  % Neutrophils, 12.0<L>% Lymphocytes, ANC: 12.92<H>                              34.5      BMP (08-18 @ 18:30)             140     |  104     |  15    		Ca++ --      Ca 9.8                ---------------------------------( 102<H>		Mg --                 3.6     |  25      |  0.55  			Ph --        LFTs (08-18 @ 18:30)      TPro 7.9 / Alb 4.4 / TBili 0.1<L> / DBili -- / AST 16 / ALT 11 / AlkPhos 117          VBG (08-18 @ 19:38)     7.38 / 48 / 29 / 28 / 3.1<H> / 50<L>%     Lactate: 1.2    --------------------------------------------------------------------------------------------    IMAGING    CT NECK: Occlusion of left IJ from the base of the skull to the innominate vein - Prelim    --------------------------------------------------------------------------------------------

## 2020-08-18 NOTE — ED PROVIDER NOTE - ATTENDING CONTRIBUTION TO CARE
45y F hx of breast CA dx in June and subsequently started on chemo via L chest port here with L sided neck swelling for the past 4 days. Area is also painful to touch and when moves head or opens mouth. No fevers. No SOB. No sore throat but does endorse pain to neck when swallowing. No arm swelling. No CP. No ear pain. Exam w poor dentition, but no obvious dental infection. No uvular deviation, no peritonsillar fullness, no hot potato voice. FROM in neck but painful with R khanna turn. Fullness to palpation L anterior neck, no mastoid ttp. No palp LAD. No crepitus. Palp UE pulses. No facial fullness. Ddx includes but is not limited to: LAD, SVC syndrome, venous clot, oropharyngeal infection w reactive ln. Check labs, CT neck w IV. Pain control.

## 2020-08-18 NOTE — ED PROVIDER NOTE - CLINICAL SUMMARY MEDICAL DECISION MAKING FREE TEXT BOX
45 y.o. female here with left sided neck fullness, worse with jaw opening, and pain radiation to behind left ear. No recent infections. Vitals wnl. Exam with mild left sided neck fullnes. Patient immunocompromiosed 2/2 chemo. No on ppx abx. Concern for deeep space infection. Low suspicion for acute thrombus as patient w/o any upper extremity swelling. Will check labs, CT neck Iv contrast, analgesia, reassess.

## 2020-08-18 NOTE — ED ADULT NURSE NOTE - NSIMPLEMENTINTERV_GEN_ALL_ED
Implemented All Universal Safety Interventions:  [place] to call system. Call bell, personal items and telephone within reach. Instruct patient to call for assistance. Room bathroom lighting operational. Non-slip footwear when patient is off stretcher. Physically safe environment: no spills, clutter or unnecessary equipment. Stretcher in lowest position, wheels locked, appropriate side rails in place.

## 2020-08-18 NOTE — ED ADULT NURSE NOTE - OBJECTIVE STATEMENT
45 year old female presents to the ED c/o left neck pain since Friday.  Patient said pain and swelling began on left lateral neck and has gotten progressively worse since Friday moving to the posterior aspect of the neck.  Pain has been constant and is severe.  She took Motrin and Tylenol for pain with some relief of pain.  She is on chemo for breast ca and last had chemo around 8/6 and is due for chemo on Thursday.  She spoke with her oncologist and had routine labs done today via her left anterior chest wall port today.  She was told to come to the ED for further eval.  She denies recent injuries to mouth or neck, fevers, chest pain, shortness of breath, dental work.

## 2020-08-18 NOTE — ED PROVIDER NOTE - PHYSICAL EXAMINATION
GENERAL: middle aged female, lying in bed, NAD, speaking in clear full sentences. Vital signs are within normal limits  HEENT: NC/AT, conjunctiva noninjected, sclera anicteric, moist mucous membranes, oropharynx not erythematous, no exudate. TMI bilateral, no mastoid tenderness.  NECK: Supple, trachea midline. left sided neck fullness at base. No LAD.  LUNG: Nonlabored respirations  CV: RRR, Pulses- Radial: 2+ bilateral. Left sided chest wall chemo port, site c/d/i.  ABDOMEN: Nondistended, nontender  MSK: No visible deformities, no upper or lower extremity edema.  NEURO: AAOx4 (to person, place, time, event), no tremor, steady gait  PSYCH: Normal mood and affect

## 2020-08-18 NOTE — ED ADULT NURSE REASSESSMENT NOTE - NS ED NURSE REASSESS COMMENT FT1
CT Surgery resident at bedside. Pt reporting increase in L neck pain. To be medicated as per MD order. Actual weight to be recorded. Pt updated on plan of care and awaiting dispo. Pt is well appearing, speaking full sentences without difficulty. Breathing spontaneous and unlabored. Safety and comfort measures maintained. Pt has been ambulating with steady gait since arrival. Denies SOB/ CP, vision changes. Call bell within reach.

## 2020-08-18 NOTE — ED PROVIDER NOTE - NS ED ROS FT
CONSTITUTIONAL: No fevers, chills, fatigue, dizziness, weakness, unexpected weight change  HEENT: LEFT SIDED NECK FULLNESS. No loss of vision, double vision, blurry vision, nasal congestion, runny nose, sore throat  CV: No chest pain, palpitations  PULM: No cough, shortness of breath  GI: No abdominal pain, nausea, vomiting, diarrhea,   SKIN: No rashes, swelling  MSK: No muscle aches, joint aches  NEURO: no headache, paresthesias

## 2020-08-18 NOTE — ED PROVIDER NOTE - OBJECTIVE STATEMENT
45 y.o. female hx of breast cancer dx 06/2020 on chemo via left chest chemo port presents to the ED for left sided neck fullness x 4 days. Patient states fullness can be painful, sharp, radiating to behind the left ear. Pain worse with jaw opening. Tolerating PO. Denies fever, chills. tolerating secretions. Denies recent infections. Denies cough, shortness of breath, chest pain. No recent dental procedures. Tried ibuprofen with some relief.

## 2020-08-19 DIAGNOSIS — I10 ESSENTIAL (PRIMARY) HYPERTENSION: ICD-10-CM

## 2020-08-19 DIAGNOSIS — I82.C12 ACUTE EMBOLISM AND THROMBOSIS OF LEFT INTERNAL JUGULAR VEIN: ICD-10-CM

## 2020-08-19 DIAGNOSIS — C50.911 MALIGNANT NEOPLASM OF UNSPECIFIED SITE OF RIGHT FEMALE BREAST: ICD-10-CM

## 2020-08-19 DIAGNOSIS — A41.9 SEPSIS, UNSPECIFIED ORGANISM: ICD-10-CM

## 2020-08-19 DIAGNOSIS — R22.1 LOCALIZED SWELLING, MASS AND LUMP, NECK: ICD-10-CM

## 2020-08-19 DIAGNOSIS — E87.8 OTHER DISORDERS OF ELECTROLYTE AND FLUID BALANCE, NOT ELSEWHERE CLASSIFIED: ICD-10-CM

## 2020-08-19 DIAGNOSIS — D64.9 ANEMIA, UNSPECIFIED: ICD-10-CM

## 2020-08-19 DIAGNOSIS — R09.89 OTHER SPECIFIED SYMPTOMS AND SIGNS INVOLVING THE CIRCULATORY AND RESPIRATORY SYSTEMS: ICD-10-CM

## 2020-08-19 DIAGNOSIS — Z02.9 ENCOUNTER FOR ADMINISTRATIVE EXAMINATIONS, UNSPECIFIED: ICD-10-CM

## 2020-08-19 DIAGNOSIS — R91.8 OTHER NONSPECIFIC ABNORMAL FINDING OF LUNG FIELD: ICD-10-CM

## 2020-08-19 DIAGNOSIS — J39.2 OTHER DISEASES OF PHARYNX: ICD-10-CM

## 2020-08-19 DIAGNOSIS — I82.409 ACUTE EMBOLISM AND THROMBOSIS OF UNSPECIFIED DEEP VEINS OF UNSPECIFIED LOWER EXTREMITY: ICD-10-CM

## 2020-08-19 DIAGNOSIS — Z98.51 TUBAL LIGATION STATUS: Chronic | ICD-10-CM

## 2020-08-19 DIAGNOSIS — J18.9 PNEUMONIA, UNSPECIFIED ORGANISM: ICD-10-CM

## 2020-08-19 DIAGNOSIS — J39.0 RETROPHARYNGEAL AND PARAPHARYNGEAL ABSCESS: ICD-10-CM

## 2020-08-19 DIAGNOSIS — Z29.9 ENCOUNTER FOR PROPHYLACTIC MEASURES, UNSPECIFIED: ICD-10-CM

## 2020-08-19 LAB
ANION GAP SERPL CALC-SCNC: 10 MMOL/L — SIGNIFICANT CHANGE UP (ref 5–17)
ANION GAP SERPL CALC-SCNC: 8 MMOL/L — SIGNIFICANT CHANGE UP (ref 5–17)
APTT BLD: 28.6 SEC — SIGNIFICANT CHANGE UP (ref 27.5–35.5)
APTT BLD: 74.7 SEC — HIGH (ref 27.5–35.5)
APTT BLD: 75.3 SEC — HIGH (ref 27.5–35.5)
BLD GP AB SCN SERPL QL: NEGATIVE — SIGNIFICANT CHANGE UP
BUN SERPL-MCNC: 10 MG/DL — SIGNIFICANT CHANGE UP (ref 7–23)
BUN SERPL-MCNC: 6 MG/DL — LOW (ref 7–23)
CALCIUM SERPL-MCNC: 7.4 MG/DL — LOW (ref 8.4–10.5)
CALCIUM SERPL-MCNC: 9.3 MG/DL — SIGNIFICANT CHANGE UP (ref 8.4–10.5)
CHLORIDE SERPL-SCNC: 109 MMOL/L — HIGH (ref 96–108)
CHLORIDE SERPL-SCNC: 96 MMOL/L — SIGNIFICANT CHANGE UP (ref 96–108)
CO2 SERPL-SCNC: 23 MMOL/L — SIGNIFICANT CHANGE UP (ref 22–31)
CO2 SERPL-SCNC: 25 MMOL/L — SIGNIFICANT CHANGE UP (ref 22–31)
CREAT SERPL-MCNC: 0.46 MG/DL — LOW (ref 0.5–1.3)
CREAT SERPL-MCNC: 0.53 MG/DL — SIGNIFICANT CHANGE UP (ref 0.5–1.3)
FERRITIN SERPL-MCNC: 314 NG/ML — HIGH (ref 15–150)
GLUCOSE SERPL-MCNC: 117 MG/DL — HIGH (ref 70–99)
GLUCOSE SERPL-MCNC: 139 MG/DL — HIGH (ref 70–99)
HCT VFR BLD CALC: 24 % — LOW (ref 34.5–45)
HCT VFR BLD CALC: 24.9 % — LOW (ref 34.5–45)
HCT VFR BLD CALC: 29.7 % — LOW (ref 34.5–45)
HGB BLD-MCNC: 7.6 G/DL — LOW (ref 11.5–15.5)
HGB BLD-MCNC: 7.8 G/DL — LOW (ref 11.5–15.5)
HGB BLD-MCNC: 9.5 G/DL — LOW (ref 11.5–15.5)
INR BLD: 1.07 RATIO — SIGNIFICANT CHANGE UP (ref 0.88–1.16)
IRON SATN MFR SERPL: 11 % — LOW (ref 14–50)
IRON SATN MFR SERPL: 27 UG/DL — LOW (ref 30–160)
LEGIONELLA AG UR QL: NEGATIVE — SIGNIFICANT CHANGE UP
MAGNESIUM SERPL-MCNC: 1.5 MG/DL — LOW (ref 1.6–2.6)
MAGNESIUM SERPL-MCNC: 1.7 MG/DL — SIGNIFICANT CHANGE UP (ref 1.6–2.6)
MCHC RBC-ENTMCNC: 28.4 PG — SIGNIFICANT CHANGE UP (ref 27–34)
MCHC RBC-ENTMCNC: 28.5 PG — SIGNIFICANT CHANGE UP (ref 27–34)
MCHC RBC-ENTMCNC: 28.7 PG — SIGNIFICANT CHANGE UP (ref 27–34)
MCHC RBC-ENTMCNC: 31.3 GM/DL — LOW (ref 32–36)
MCHC RBC-ENTMCNC: 31.7 GM/DL — LOW (ref 32–36)
MCHC RBC-ENTMCNC: 32 GM/DL — SIGNIFICANT CHANGE UP (ref 32–36)
MCV RBC AUTO: 88.9 FL — SIGNIFICANT CHANGE UP (ref 80–100)
MCV RBC AUTO: 90.6 FL — SIGNIFICANT CHANGE UP (ref 80–100)
MCV RBC AUTO: 90.9 FL — SIGNIFICANT CHANGE UP (ref 80–100)
NRBC # BLD: 0 /100 WBCS — SIGNIFICANT CHANGE UP (ref 0–0)
PHOSPHATE SERPL-MCNC: 2.6 MG/DL — SIGNIFICANT CHANGE UP (ref 2.5–4.5)
PLATELET # BLD AUTO: 220 K/UL — SIGNIFICANT CHANGE UP (ref 150–400)
PLATELET # BLD AUTO: 222 K/UL — SIGNIFICANT CHANGE UP (ref 150–400)
PLATELET # BLD AUTO: 274 K/UL — SIGNIFICANT CHANGE UP (ref 150–400)
POTASSIUM SERPL-MCNC: 2.9 MMOL/L — CRITICAL LOW (ref 3.5–5.3)
POTASSIUM SERPL-MCNC: 3.4 MMOL/L — LOW (ref 3.5–5.3)
POTASSIUM SERPL-SCNC: 2.9 MMOL/L — CRITICAL LOW (ref 3.5–5.3)
POTASSIUM SERPL-SCNC: 3.4 MMOL/L — LOW (ref 3.5–5.3)
PROTHROM AB SERPL-ACNC: 12.7 SEC — SIGNIFICANT CHANGE UP (ref 10.6–13.6)
RBC # BLD: 2.65 M/UL — LOW (ref 3.8–5.2)
RBC # BLD: 2.74 M/UL — LOW (ref 3.8–5.2)
RBC # BLD: 3.34 M/UL — LOW (ref 3.8–5.2)
RBC # BLD: 3.4 M/UL — LOW (ref 3.8–5.2)
RBC # FLD: 15.4 % — HIGH (ref 10.3–14.5)
RBC # FLD: 15.4 % — HIGH (ref 10.3–14.5)
RBC # FLD: 15.5 % — HIGH (ref 10.3–14.5)
RETICS #: 66 K/UL — SIGNIFICANT CHANGE UP (ref 25–125)
RETICS/RBC NFR: 1.9 % — SIGNIFICANT CHANGE UP (ref 0.5–2.5)
RH IG SCN BLD-IMP: POSITIVE — SIGNIFICANT CHANGE UP
SARS-COV-2 IGG SERPL QL IA: NEGATIVE — SIGNIFICANT CHANGE UP
SARS-COV-2 IGM SERPL IA-ACNC: <0.1 INDEX — SIGNIFICANT CHANGE UP
SARS-COV-2 RNA SPEC QL NAA+PROBE: SIGNIFICANT CHANGE UP
SODIUM SERPL-SCNC: 131 MMOL/L — LOW (ref 135–145)
SODIUM SERPL-SCNC: 140 MMOL/L — SIGNIFICANT CHANGE UP (ref 135–145)
TIBC SERPL-MCNC: 248 UG/DL — SIGNIFICANT CHANGE UP (ref 220–430)
UIBC SERPL-MCNC: 221 UG/DL — SIGNIFICANT CHANGE UP (ref 110–370)
WBC # BLD: 16.97 K/UL — HIGH (ref 3.8–10.5)
WBC # BLD: 17.03 K/UL — HIGH (ref 3.8–10.5)
WBC # BLD: 22.44 K/UL — HIGH (ref 3.8–10.5)
WBC # FLD AUTO: 16.97 K/UL — HIGH (ref 3.8–10.5)
WBC # FLD AUTO: 17.03 K/UL — HIGH (ref 3.8–10.5)
WBC # FLD AUTO: 22.44 K/UL — HIGH (ref 3.8–10.5)

## 2020-08-19 PROCEDURE — 31575 DIAGNOSTIC LARYNGOSCOPY: CPT

## 2020-08-19 PROCEDURE — 99223 1ST HOSP IP/OBS HIGH 75: CPT

## 2020-08-19 PROCEDURE — 99222 1ST HOSP IP/OBS MODERATE 55: CPT | Mod: 25

## 2020-08-19 PROCEDURE — 93971 EXTREMITY STUDY: CPT | Mod: 26

## 2020-08-19 RX ORDER — AMLODIPINE BESYLATE 2.5 MG/1
5 TABLET ORAL DAILY
Refills: 0 | Status: DISCONTINUED | OUTPATIENT
Start: 2020-08-19 | End: 2020-08-24

## 2020-08-19 RX ORDER — OXYCODONE HYDROCHLORIDE 5 MG/1
5 TABLET ORAL EVERY 6 HOURS
Refills: 0 | Status: DISCONTINUED | OUTPATIENT
Start: 2020-08-19 | End: 2020-08-24

## 2020-08-19 RX ORDER — POTASSIUM CHLORIDE 20 MEQ
40 PACKET (EA) ORAL ONCE
Refills: 0 | Status: COMPLETED | OUTPATIENT
Start: 2020-08-19 | End: 2020-08-19

## 2020-08-19 RX ORDER — POTASSIUM CHLORIDE 20 MEQ
10 PACKET (EA) ORAL
Refills: 0 | Status: COMPLETED | OUTPATIENT
Start: 2020-08-19 | End: 2020-08-19

## 2020-08-19 RX ORDER — HEPARIN SODIUM 5000 [USP'U]/ML
3000 INJECTION INTRAVENOUS; SUBCUTANEOUS EVERY 6 HOURS
Refills: 0 | Status: DISCONTINUED | OUTPATIENT
Start: 2020-08-19 | End: 2020-08-21

## 2020-08-19 RX ORDER — PIPERACILLIN AND TAZOBACTAM 4; .5 G/20ML; G/20ML
3.38 INJECTION, POWDER, LYOPHILIZED, FOR SOLUTION INTRAVENOUS EVERY 8 HOURS
Refills: 0 | Status: DISCONTINUED | OUTPATIENT
Start: 2020-08-19 | End: 2020-08-19

## 2020-08-19 RX ORDER — ONDANSETRON 8 MG/1
4 TABLET, FILM COATED ORAL EVERY 6 HOURS
Refills: 0 | Status: DISCONTINUED | OUTPATIENT
Start: 2020-08-19 | End: 2020-08-24

## 2020-08-19 RX ORDER — PIPERACILLIN AND TAZOBACTAM 4; .5 G/20ML; G/20ML
3.38 INJECTION, POWDER, LYOPHILIZED, FOR SOLUTION INTRAVENOUS ONCE
Refills: 0 | Status: COMPLETED | OUTPATIENT
Start: 2020-08-19 | End: 2020-08-19

## 2020-08-19 RX ORDER — HEPARIN SODIUM 5000 [USP'U]/ML
INJECTION INTRAVENOUS; SUBCUTANEOUS
Qty: 25000 | Refills: 0 | Status: DISCONTINUED | OUTPATIENT
Start: 2020-08-19 | End: 2020-08-21

## 2020-08-19 RX ORDER — HEPARIN SODIUM 5000 [USP'U]/ML
6000 INJECTION INTRAVENOUS; SUBCUTANEOUS ONCE
Refills: 0 | Status: COMPLETED | OUTPATIENT
Start: 2020-08-19 | End: 2020-08-19

## 2020-08-19 RX ORDER — ACETAMINOPHEN 500 MG
650 TABLET ORAL EVERY 6 HOURS
Refills: 0 | Status: DISCONTINUED | OUTPATIENT
Start: 2020-08-19 | End: 2020-08-24

## 2020-08-19 RX ORDER — MORPHINE SULFATE 50 MG/1
4 CAPSULE, EXTENDED RELEASE ORAL EVERY 6 HOURS
Refills: 0 | Status: DISCONTINUED | OUTPATIENT
Start: 2020-08-19 | End: 2020-08-24

## 2020-08-19 RX ORDER — HEPARIN SODIUM 5000 [USP'U]/ML
6000 INJECTION INTRAVENOUS; SUBCUTANEOUS EVERY 6 HOURS
Refills: 0 | Status: DISCONTINUED | OUTPATIENT
Start: 2020-08-19 | End: 2020-08-21

## 2020-08-19 RX ORDER — SODIUM CHLORIDE 9 MG/ML
1000 INJECTION INTRAMUSCULAR; INTRAVENOUS; SUBCUTANEOUS
Refills: 0 | Status: COMPLETED | OUTPATIENT
Start: 2020-08-19 | End: 2020-08-19

## 2020-08-19 RX ADMIN — MORPHINE SULFATE 4 MILLIGRAM(S): 50 CAPSULE, EXTENDED RELEASE ORAL at 05:30

## 2020-08-19 RX ADMIN — OXYCODONE HYDROCHLORIDE 5 MILLIGRAM(S): 5 TABLET ORAL at 14:40

## 2020-08-19 RX ADMIN — OXYCODONE HYDROCHLORIDE 5 MILLIGRAM(S): 5 TABLET ORAL at 14:10

## 2020-08-19 RX ADMIN — MORPHINE SULFATE 4 MILLIGRAM(S): 50 CAPSULE, EXTENDED RELEASE ORAL at 20:40

## 2020-08-19 RX ADMIN — PIPERACILLIN AND TAZOBACTAM 200 GRAM(S): 4; .5 INJECTION, POWDER, LYOPHILIZED, FOR SOLUTION INTRAVENOUS at 02:06

## 2020-08-19 RX ADMIN — MORPHINE SULFATE 4 MILLIGRAM(S): 50 CAPSULE, EXTENDED RELEASE ORAL at 10:15

## 2020-08-19 RX ADMIN — HEPARIN SODIUM 1300 UNIT(S)/HR: 5000 INJECTION INTRAVENOUS; SUBCUTANEOUS at 00:32

## 2020-08-19 RX ADMIN — Medication 100 MILLIEQUIVALENT(S): at 07:44

## 2020-08-19 RX ADMIN — MORPHINE SULFATE 4 MILLIGRAM(S): 50 CAPSULE, EXTENDED RELEASE ORAL at 09:59

## 2020-08-19 RX ADMIN — SODIUM CHLORIDE 100 MILLILITER(S): 9 INJECTION INTRAMUSCULAR; INTRAVENOUS; SUBCUTANEOUS at 15:36

## 2020-08-19 RX ADMIN — Medication 100 MILLIEQUIVALENT(S): at 08:56

## 2020-08-19 RX ADMIN — HEPARIN SODIUM 1300 UNIT(S)/HR: 5000 INJECTION INTRAVENOUS; SUBCUTANEOUS at 07:00

## 2020-08-19 RX ADMIN — AMLODIPINE BESYLATE 5 MILLIGRAM(S): 2.5 TABLET ORAL at 04:59

## 2020-08-19 RX ADMIN — ONDANSETRON 4 MILLIGRAM(S): 8 TABLET, FILM COATED ORAL at 08:56

## 2020-08-19 RX ADMIN — ONDANSETRON 4 MILLIGRAM(S): 8 TABLET, FILM COATED ORAL at 16:19

## 2020-08-19 RX ADMIN — MORPHINE SULFATE 4 MILLIGRAM(S): 50 CAPSULE, EXTENDED RELEASE ORAL at 20:21

## 2020-08-19 RX ADMIN — HEPARIN SODIUM 6000 UNIT(S): 5000 INJECTION INTRAVENOUS; SUBCUTANEOUS at 00:31

## 2020-08-19 RX ADMIN — MORPHINE SULFATE 4 MILLIGRAM(S): 50 CAPSULE, EXTENDED RELEASE ORAL at 04:58

## 2020-08-19 RX ADMIN — SODIUM CHLORIDE 100 MILLILITER(S): 9 INJECTION INTRAMUSCULAR; INTRAVENOUS; SUBCUTANEOUS at 04:06

## 2020-08-19 RX ADMIN — MORPHINE SULFATE 4 MILLIGRAM(S): 50 CAPSULE, EXTENDED RELEASE ORAL at 01:15

## 2020-08-19 RX ADMIN — Medication 100 MILLIEQUIVALENT(S): at 10:00

## 2020-08-19 RX ADMIN — HEPARIN SODIUM 1300 UNIT(S)/HR: 5000 INJECTION INTRAVENOUS; SUBCUTANEOUS at 13:17

## 2020-08-19 RX ADMIN — OXYCODONE HYDROCHLORIDE 5 MILLIGRAM(S): 5 TABLET ORAL at 02:06

## 2020-08-19 RX ADMIN — SODIUM CHLORIDE 100 MILLILITER(S): 9 INJECTION INTRAMUSCULAR; INTRAVENOUS; SUBCUTANEOUS at 04:39

## 2020-08-19 RX ADMIN — PIPERACILLIN AND TAZOBACTAM 25 GRAM(S): 4; .5 INJECTION, POWDER, LYOPHILIZED, FOR SOLUTION INTRAVENOUS at 09:03

## 2020-08-19 NOTE — PROGRESS NOTE ADULT - ASSESSMENT
ASSESSMENT: Patient is a 45y old f with recently diagnosed breast CA (treated through mediport in L chest with chemotherapy) with provoked acute thrombus of Left IJ.    PLAN:   - continue heparin drip  - duplex of L neck/IJ  - vascular will follow   - no immediate surgery indicated  - Patient discussed with vascular fellow    Vascular Surgery #[id number]

## 2020-08-19 NOTE — H&P ADULT - PROBLEM SELECTOR PLAN 1
acute LIJ DVT extending along L mediport with surrounding retropharyngeal edema   treating with heparin gtt for now  vascular recs appreciated - IR consult in AM for catheter directed thrombolysis   pain control with oxycodone and morphine prn   likely in setting of port and hypercoagulable state 2/2 malignancy; low suspicion for Lemierre's disease

## 2020-08-19 NOTE — PROGRESS NOTE ADULT - SUBJECTIVE AND OBJECTIVE BOX
Vascular Surgery Progress Note    Interval:  No acute events overnight.    Subjective:  Patient seen and examined. Reports pain is well controlled. Denies N/V/f/c, SOB, or chest pain.    Vital Signs Last 24 Hrs  T(C): 37 (19 Aug 2020 04:40), Max: 37.4 (18 Aug 2020 19:30)  T(F): 98.6 (19 Aug 2020 04:40), Max: 99.3 (18 Aug 2020 19:30)  HR: 85 (19 Aug 2020 04:40) (74 - 104)  BP: 146/82 (19 Aug 2020 04:40) (110/69 - 146/82)  BP(mean): --  RR: 18 (19 Aug 2020 04:40) (16 - 18)  SpO2: 100% (19 Aug 2020 04:40) (98% - 100%)    Exam:  Gen: NAD, resting in bed, alert and responding appropriately  Resp: Airway patent, non-labored respirations  Abd: Soft, ND, NT, no rebound or guarding.   Neuro: AAOx3, no focal deficits  Extremities: L sided fullness and swelling, +tender on exam, +ROM of neck but with pain     I&O's Detail    18 Aug 2020 07:01  -  19 Aug 2020 07:00  --------------------------------------------------------  IN:    heparin  Infusion.: 39 mL    Oral Fluid: 120 mL    sodium chloride 0.9%.: 300 mL  Total IN: 459 mL    OUT:    Voided: 300 mL  Total OUT: 300 mL    Total NET: 159 mL      19 Aug 2020 07:01  -  19 Aug 2020 08:38  --------------------------------------------------------  IN:  Total IN: 0 mL    OUT:    Voided: 200 mL  Total OUT: 200 mL    Total NET: -200 mL          Daily Height in cm: 160.02 (18 Aug 2020 15:50)    Daily     MEDICATIONS  (STANDING):  amLODIPine   Tablet 5 milliGRAM(s) Oral daily  heparin  Infusion.  Unit(s)/Hr (13 mL/Hr) IV Continuous <Continuous>  piperacillin/tazobactam IVPB.. 3.375 Gram(s) IV Intermittent every 8 hours  potassium chloride  10 mEq/100 mL IVPB 10 milliEquivalent(s) IV Intermittent every 1 hour  sodium chloride 0.9%. 1000 milliLiter(s) (100 mL/Hr) IV Continuous <Continuous>    MEDICATIONS  (PRN):  acetaminophen   Tablet .. 650 milliGRAM(s) Oral every 6 hours PRN Temp greater or equal to 38C (100.4F), Mild Pain (1 - 3)  heparin   Injectable 6000 Unit(s) IV Push every 6 hours PRN For aPTT less than 40  heparin   Injectable 3000 Unit(s) IV Push every 6 hours PRN For aPTT between 40 - 57  morphine  - Injectable 4 milliGRAM(s) IV Push every 6 hours PRN Severe Pain (7 - 10)  ondansetron Injectable 4 milliGRAM(s) IV Push every 6 hours PRN Nausea and/or Vomiting  oxyCODONE    IR 5 milliGRAM(s) Oral every 6 hours PRN Moderate Pain (4 - 6)      LABS:                        7.8    16.97 )-----------( 220      ( 19 Aug 2020 06:35 )             24.9     08-19    140  |  109<H>  |  10  ----------------------------<  117<H>  2.9<LL>   |  23  |  0.46<L>    Ca    7.4<L>      19 Aug 2020 06:35  Phos  2.6     08-19  Mg     1.5     08-19    TPro  7.9  /  Alb  4.4  /  TBili  0.1<L>  /  DBili  x   /  AST  16  /  ALT  11  /  AlkPhos  117  08-18    PT/INR - ( 18 Aug 2020 23:38 )   PT: 12.7 sec;   INR: 1.07 ratio         PTT - ( 19 Aug 2020 06:35 )  PTT:74.7 sec

## 2020-08-19 NOTE — H&P ADULT - ASSESSMENT
45F with PMH of recently diagnosed R breast cancer (started on chemo Doxorubicin/cyclophosphamide in July, last session 8/6/20 via L chest wall mediport, Neulasta on 8/7/20) and HTN p/w L side neck pain and fullness x 5 days, found to have L IJ DVT extending along mediport with retropharyngeal edema and possible PNA.

## 2020-08-19 NOTE — CONSULT NOTE ADULT - PROBLEM SELECTOR RECOMMENDATION 9
- Retropharyngeal swelling and edema on C1-C7 level noted.  - Also extensive LIJ DVT noted.  - On hep gtt. No acute intervention planned per vasc surg or IR.  - No recent infection or travel. No recent illness.  - Does not appear infectious etiology. Will discuss among ID team - Retropharyngeal swelling and edema on C1-C7 level noted.  - Also extensive LIJ DVT noted.  - On hep gtt.  - No recent infection or travel. No recent illness.  - Does not appear infectious etiology. Recommend monitor off abx  - Avoid carotid massage

## 2020-08-19 NOTE — H&P ADULT - NSHPREVIEWOFSYSTEMS_GEN_ALL_CORE
REVIEW OF SYSTEMS:    CONSTITUTIONAL: No fevers, chills  EYES/ENT: No visual changes, +throat pain on swallowing   NECK: +L neck pain and swelling   RESPIRATORY: No cough, no shortness of breath  CARDIOVASCULAR: No chest pain or palpitations  GASTROINTESTINAL: +nausea, +vomiting, no abdominal pain, +diarrhea   GENITOURINARY: no hematuria, no dysuria  NEUROLOGICAL: no numbness, +headaches, no confusion   MUSCULOSKELETAL: no back pain, no focal weakness   SKIN: No rashes, or lesions   PSYCH: no anxiety, depression  HEME: no gum bleeding, no bruising

## 2020-08-19 NOTE — H&P ADULT - NSHPSOCIALHISTORY_GEN_ALL_CORE
former smoker (quit this past week, 0.5PPD x 20years), no etoh, +smokes marijuana 3x/week   lives at home with  and kids  currently unemployed, independent with ADLs

## 2020-08-19 NOTE — H&P ADULT - PROBLEM SELECTOR PROBLEM 5
Discharge planning issues Malignant neoplasm of right female breast, unspecified estrogen receptor status, unspecified site of breast

## 2020-08-19 NOTE — H&P ADULT - PROBLEM SELECTOR PLAN 4
recently diagnosed R breast cancer, last chemo with doxorubicin and cyclophosphamide on 8/6/20 (next due 8/20/20); s/p neulasta on 8/7/20 recently diagnosed R breast cancer, last chemo with doxorubicin and cyclophosphamide on 8/6/20 (next due 8/20/20); s/p neulasta on 8/7/20  f/u with oncology moderate retropharyngeal edema noted on CT scan - could be 2/2 DVT vs infections vs metastatic disease   currently no airway compromise, no stridor, not hypoxic; no preceding infections, no abscess noted on imaging  will treat DVT as above   treat with zosyn for possible infection   consider ID and ENT consult in AM

## 2020-08-19 NOTE — CONSULT NOTE ADULT - SUBJECTIVE AND OBJECTIVE BOX
**** INCOMPLETE NOTE UNTIL SIGNED BY ATTENDING ****      Patient is a 45y old  Female who presents with a chief complaint of L neck pain and fullness (19 Aug 2020 11:20)      HPI per H&P:  45F with PMH of recently diagnosed R breast cancer (started on chemo Doxorubicin/cyclophosphamide in July, last session 8/6/20 via L chest wall mediport, Neulasta on 8/7/20) and HTN p/w L side neck pain and fullness x 5 days. Pt states she started noticing some L neck fullness 5 days ago, followed by pain and then over the weekend noted significant swelling. Swelling and pain have been getting progressively worse since that time; pain is pulling sensation, 10/10 at its worst, sometimes radiates to the back of her head and L sided temple, worse with movement of her head (abena towards th R). Denies any associated fevers, chills, inability to handle secretions, no SOB, no wheezing, no stridor, no CP, no erythema/swelling/pain at L mediport site. Endorses nausea/vomiting x 2 episode this past weekend, without associated abdominal pain; +diarrhea since chemo. On day of admission, pt also started noticing some L sided pain on swallowing and pain was more severe than prior, prompting ED visit. Denies any recent sick contact or recent illness, denies any preceeding throat infections, has poor dental hygiene. (19 Aug 2020 01:27)    CTAP revealed long segment of occlusive LIJ DVT, which extends to the level of mediport. Moderate retropharyngeal edema extending from the C1 to C7 level. Edema tracking along the L carotid space and L SCM muscle to the lower neck/chest wall w/o abscess. 3 x 2 cm R lower lobe opacity suspicious for pneumonia/pulm mets noted. Patient was started on Zosyn. ID was consulted for neck swelling.      prior hospital charts reviewed [X]  primary team notes reviewed [X]  other consultant notes reviewed [X]    PAST MEDICAL & SURGICAL HISTORY:  Breast cancer  H/O tubal ligation      Allergies  No Known Allergies        ANTIMICROBIALS:  piperacillin/tazobactam IVPB.. 3.375 every 8 hours      OTHER MEDS: MEDICATIONS  (STANDING):  acetaminophen   Tablet .. 650 every 6 hours PRN  amLODIPine   Tablet 5 daily  heparin   Injectable 6000 every 6 hours PRN  heparin   Injectable 3000 every 6 hours PRN  heparin  Infusion.  <Continuous>  morphine  - Injectable 4 every 6 hours PRN  ondansetron Injectable 4 every 6 hours PRN  oxyCODONE    IR 5 every 6 hours PRN      SOCIAL HISTORY:  1/2 pack a day for 20 years; intermittent EtOH uses; uses marijuana    FAMILY HISTORY:  No pertinent family history in first degree relatives      REVIEW OF SYSTEMS  [  ] ROS unobtainable because:    [X] All other systems negative except as noted below:	    Constitutional:  [ ] fever [ ] chills  [ ] weight loss  [ ] weakness  Skin:  [ ] rash [ ] phlebitis	  Eyes: [ ] icterus [ ] pain  [ ] discharge	  ENMT: [ ] sore throat  [ ] thrush [ ] ulcers [ ] exudates [X] neck pain  Respiratory: [ ] dyspnea [ ] hemoptysis [ ] cough [ ] sputum	  Cardiovascular:  [ ] chest pain [ ] palpitations [ ] edema	  Gastrointestinal:  [X] nausea [X] vomiting [X] diarrhea [ ] constipation [X] post-prandial pain	  Genitourinary:  [ ] dysuria [ ] frequency [ ] hematuria [ ] discharge [ ] flank pain  [ ] incontinence  Musculoskeletal:  [ ] myalgias [ ] arthralgias [ ] arthritis  [ ] back pain  Neurological:  [ ] headache [ ] seizures  [ ] confusion/altered mental status  Psychiatric:  [ ] anxiety [ ] depression	  Hematology/Lymphatics:  [ ] lymphadenopathy  Endocrine:  [ ] adrenal [ ] thyroid  Allergic/Immunologic:	 [ ] transplant [ ] seasonal    Vital Signs Last 24 Hrs  T(F): 99.3 (08-19-20 @ 10:08), Max: 99.3 (08-18-20 @ 19:30)    Vital Signs Last 24 Hrs  HR: 83 (08-19-20 @ 10:08) (74 - 104)  BP: 131/78 (08-19-20 @ 10:08) (110/69 - 146/82)  RR: 18 (08-19-20 @ 10:08)  SpO2: 99% (08-19-20 @ 10:08) (98% - 100%)  Wt(kg): --    PHYSICAL EXAM:  Constitutional: non-toxic, no distress  HEAD/EYES: anicteric, no conjunctival injection  ENT: L neck swollen, nonerythematous, and exquisitely tender to light palpation; oropharynx difficult to visualize due to pain upon opening mouth  Cardiovascular:  normal S1, S2, no murmur, no edema  Chest: clear BS bilaterally, no wheezes, no rales; L upper chest with mediport nontender, nonerythematous.  GI: soft, non-tender, normal bowel sounds  : no chandler, no CVA tenderness  Musculoskeletal:  no synovitis, normal ROM  Neurologic: awake and alert, normal strength, no focal findings  Skin:  no rash, no erythema, no phlebitis  Heme/Onc: no lymphadenopathy   Psychiatric:  awake, alert, appropriate mood                 7.8    16.97 )-----------( 220      ( 19 Aug 2020 06:35 )             24.9       08-19    140  |  109<H>  |  10  ----------------------------<  117<H>  2.9<LL>   |  23  |  0.46<L>    Ca    7.4<L>      19 Aug 2020 06:35  Phos  2.6     08-19  Mg     1.5     08-19    TPro  7.9  /  Alb  4.4  /  TBili  0.1<L>  /  DBili  x   /  AST  16  /  ALT  11  /  AlkPhos  117  08-18          MICROBIOLOGY:          RADIOLOGY:    EXAM:  DUPLEX EXT VEINS UPPER LT                            PROCEDURE DATE:  08/19/2020    IMPRESSION:    There is acute left internal jugular and brachiocephalic vein DVT.    Findings discussed with LYNNETTE Murphy at 11:31 AM 8/19/2020      EXAM:  CT NECK SOFT TISSUE IC                          EXAM:  CT CHEST IC                            PROCEDURE DATE:  08/18/2020        IMPRESSION:  CT NECK:  1.  Long segment of occlusive deep venous thrombosis in the left internal jugular vein.  Inferiorly the thrombus slightly extends along the patient's medication port to the junction of the left internal jugular vein and left subclavian vein. The left brachiocephalic vein and SVC are grossly patent.  2.  Moderate retropharyngeal edema extending from the C1 level to the C7 level.  There is also edema tracking along the left carotid space and left sternocleidomastoid muscle to the lower neck/chest wall.  No discrete abscess.  3.  Nonspecific subcentimeter left-sided cervical lymph nodes.    CT CHEST: Approximately 3 x 2 cm right lower lobe opacity is suspicious for pneumonia however pulmonary metastasis is not excluded.  A follow-up chest CT is recommended in one month after antibiotic therapy to ensure resolution.    The major findings were discussed with Dr. Crocker 8/18/2020 10:01 PM by Dr. Mccallum with read back confirmation.            imaging below personally reviewed **** INCOMPLETE NOTE UNTIL SIGNED BY ATTENDING ****      Patient is a 45y old  Female who presents with a chief complaint of L neck pain and fullness (19 Aug 2020 11:20)      HPI per H&P:  45F with PMH of recently diagnosed R breast cancer (started on chemo Doxorubicin/cyclophosphamide in July, last session 8/6/20 via L chest wall mediport, Neulasta on 8/7/20) and HTN p/w L side neck pain and fullness x 5 days. Pt states she started noticing some L neck fullness 5 days ago, followed by pain and then over the weekend noted significant swelling. Swelling and pain have been getting progressively worse since that time; pain is pulling sensation, 10/10 at its worst, sometimes radiates to the back of her head and L sided temple, worse with movement of her head (abena towards th R). Denies any associated fevers, chills, inability to handle secretions, no SOB, no wheezing, no stridor, no CP, no erythema/swelling/pain at L mediport site. Endorses nausea/vomiting x 2 episode this past weekend, without associated abdominal pain; +diarrhea since chemo. On day of admission, pt also started noticing some L sided pain on swallowing and pain was more severe than prior, prompting ED visit. Denies any recent sick contact or recent illness, denies any preceeding throat infections, has poor dental hygiene. (19 Aug 2020 01:27)    CTAP revealed long segment of occlusive LIJ DVT, which extends to the level of mediport. Moderate retropharyngeal edema extending from the C1 to C7 level. Edema tracking along the L carotid space and L SCM muscle to the lower neck/chest wall w/o abscess. 3 x 2 cm R lower lobe opacity suspicious for pneumonia/pulm mets noted. Patient was started on Zosyn. ID was consulted for neck swelling.      prior hospital charts reviewed [X]  primary team notes reviewed [X]  other consultant notes reviewed [X]    PAST MEDICAL & SURGICAL HISTORY:  Breast cancer  H/O tubal ligation      Allergies  No Known Allergies        ANTIMICROBIALS:  piperacillin/tazobactam IVPB.. 3.375 every 8 hours      OTHER MEDS: MEDICATIONS  (STANDING):  acetaminophen   Tablet .. 650 every 6 hours PRN  amLODIPine   Tablet 5 daily  heparin   Injectable 6000 every 6 hours PRN  heparin   Injectable 3000 every 6 hours PRN  heparin  Infusion.  <Continuous>  morphine  - Injectable 4 every 6 hours PRN  ondansetron Injectable 4 every 6 hours PRN  oxyCODONE    IR 5 every 6 hours PRN      SOCIAL HISTORY:  1/2 pack a day for 20 years; intermittent EtOH uses; uses marijuana    FAMILY HISTORY:  No pertinent family history in first degree relatives      REVIEW OF SYSTEMS  [  ] ROS unobtainable because:    [X] All other systems negative except as noted below:	    Constitutional:  [ ] fever [ ] chills  [ ] weight loss  [ ] weakness  Skin:  [ ] rash [ ] phlebitis	  Eyes: [ ] icterus [ ] pain  [ ] discharge	  ENMT: [ ] sore throat  [ ] thrush [ ] ulcers [ ] exudates [X] neck pain  Respiratory: [ ] dyspnea [ ] hemoptysis [ ] cough [ ] sputum	  Cardiovascular:  [ ] chest pain [ ] palpitations [ ] edema	  Gastrointestinal:  [X] nausea [X] vomiting [X] diarrhea [ ] constipation [X] post-prandial pain	  Genitourinary:  [ ] dysuria [ ] frequency [ ] hematuria [ ] discharge [ ] flank pain  [ ] incontinence  Musculoskeletal:  [ ] myalgias [ ] arthralgias [ ] arthritis  [ ] back pain  Neurological:  [ ] headache [ ] seizures  [ ] confusion/altered mental status  Psychiatric:  [ ] anxiety [ ] depression	  Hematology/Lymphatics:  [ ] lymphadenopathy  Endocrine:  [ ] adrenal [ ] thyroid  Allergic/Immunologic:	 [ ] transplant [ ] seasonal    Vital Signs Last 24 Hrs  T(F): 99.3 (08-19-20 @ 10:08), Max: 99.3 (08-18-20 @ 19:30)    Vital Signs Last 24 Hrs  HR: 83 (08-19-20 @ 10:08) (74 - 104)  BP: 131/78 (08-19-20 @ 10:08) (110/69 - 146/82)  RR: 18 (08-19-20 @ 10:08)  SpO2: 99% (08-19-20 @ 10:08) (98% - 100%)  Wt(kg): --    PHYSICAL EXAM:  Constitutional: non-toxic, no distress  HEAD/EYES: anicteric, no conjunctival injection  ENT: L neck swollen, nonerythematous, and exquisitely tender to light palpation; oropharynx difficult to visualize due to pain upon opening mouth; neck supple  Cardiovascular:  normal S1, S2, no murmur, no edema  Chest: clear BS bilaterally, no wheezes, no rales; L upper chest with mediport nontender, nonerythematous.  GI: soft, non-tender, normal bowel sounds  : no chandler, no CVA tenderness  Musculoskeletal:  no synovitis, normal ROM  Neurologic: awake and alert, normal strength, no focal findings  Skin:  no rash, no erythema, no phlebitis  Heme/Onc: no lymphadenopathy   Psychiatric:  awake, alert, appropriate mood                 7.8    16.97 )-----------( 220      ( 19 Aug 2020 06:35 )             24.9       08-19    140  |  109<H>  |  10  ----------------------------<  117<H>  2.9<LL>   |  23  |  0.46<L>    Ca    7.4<L>      19 Aug 2020 06:35  Phos  2.6     08-19  Mg     1.5     08-19    TPro  7.9  /  Alb  4.4  /  TBili  0.1<L>  /  DBili  x   /  AST  16  /  ALT  11  /  AlkPhos  117  08-18          MICROBIOLOGY:          RADIOLOGY:    EXAM:  DUPLEX EXT VEINS UPPER LT                            PROCEDURE DATE:  08/19/2020    IMPRESSION:    There is acute left internal jugular and brachiocephalic vein DVT.    Findings discussed with LYNNETTE Murphy at 11:31 AM 8/19/2020      EXAM:  CT NECK SOFT TISSUE IC                          EXAM:  CT CHEST IC                            PROCEDURE DATE:  08/18/2020        IMPRESSION:  CT NECK:  1.  Long segment of occlusive deep venous thrombosis in the left internal jugular vein.  Inferiorly the thrombus slightly extends along the patient's medication port to the junction of the left internal jugular vein and left subclavian vein. The left brachiocephalic vein and SVC are grossly patent.  2.  Moderate retropharyngeal edema extending from the C1 level to the C7 level.  There is also edema tracking along the left carotid space and left sternocleidomastoid muscle to the lower neck/chest wall.  No discrete abscess.  3.  Nonspecific subcentimeter left-sided cervical lymph nodes.    CT CHEST: Approximately 3 x 2 cm right lower lobe opacity is suspicious for pneumonia however pulmonary metastasis is not excluded.  A follow-up chest CT is recommended in one month after antibiotic therapy to ensure resolution.    The major findings were discussed with Dr. Crocker 8/18/2020 10:01 PM by Dr. Mccallum with read back confirmation.            imaging below personally reviewed Patient is a 45y old  Female who presents with a chief complaint of L neck pain and fullness (19 Aug 2020 11:20)      HPI per H&P:  45F with PMH of recently diagnosed R breast cancer (started on chemo Doxorubicin/cyclophosphamide in July, last session 8/6/20 via L chest wall mediport, Neulasta on 8/7/20) and HTN p/w L side neck pain and fullness x 5 days. Pt states she started noticing some L neck fullness 5 days ago, followed by pain and then over the weekend noted significant swelling. Swelling and pain have been getting progressively worse since that time; pain is pulling sensation, 10/10 at its worst, sometimes radiates to the back of her head and L sided temple, worse with movement of her head (abena towards th R). Denies any associated fevers, chills, inability to handle secretions, no SOB, no wheezing, no stridor, no CP, no erythema/swelling/pain at L mediport site. Endorses nausea/vomiting x 2 episode this past weekend, without associated abdominal pain; +diarrhea since chemo. On day of admission, pt also started noticing some L sided pain on swallowing and pain was more severe than prior, prompting ED visit. Denies any recent sick contact or recent illness, denies any preceeding throat infections, has poor dental hygiene. (19 Aug 2020 01:27)    CTAP revealed long segment of occlusive LIJ DVT, which extends to the level of mediport. Moderate retropharyngeal edema extending from the C1 to C7 level. Edema tracking along the L carotid space and L SCM muscle to the lower neck/chest wall w/o abscess. 3 x 2 cm R lower lobe opacity suspicious for pneumonia/pulm mets noted. Patient was started on Zosyn. ID was consulted for neck swelling.      prior hospital charts reviewed [X]  primary team notes reviewed [X]  other consultant notes reviewed [X]    PAST MEDICAL & SURGICAL HISTORY:  Breast cancer  H/O tubal ligation      Allergies  No Known Allergies        ANTIMICROBIALS:  piperacillin/tazobactam IVPB.. 3.375 every 8 hours      OTHER MEDS: MEDICATIONS  (STANDING):  acetaminophen   Tablet .. 650 every 6 hours PRN  amLODIPine   Tablet 5 daily  heparin   Injectable 6000 every 6 hours PRN  heparin   Injectable 3000 every 6 hours PRN  heparin  Infusion.  <Continuous>  morphine  - Injectable 4 every 6 hours PRN  ondansetron Injectable 4 every 6 hours PRN  oxyCODONE    IR 5 every 6 hours PRN      SOCIAL HISTORY:  1/2 pack a day for 20 years; intermittent EtOH uses; uses marijuana    FAMILY HISTORY:  No pertinent family history in first degree relatives      REVIEW OF SYSTEMS  [  ] ROS unobtainable because:    [X] All other systems negative except as noted below:	    Constitutional:  [ ] fever [ ] chills  [ ] weight loss  [ ] weakness  Skin:  [ ] rash [ ] phlebitis	  Eyes: [ ] icterus [ ] pain  [ ] discharge	  ENMT: [ ] sore throat  [ ] thrush [ ] ulcers [ ] exudates [X] neck pain  Respiratory: [ ] dyspnea [ ] hemoptysis [ ] cough [ ] sputum	  Cardiovascular:  [ ] chest pain [ ] palpitations [ ] edema	  Gastrointestinal:  [X] nausea [X] vomiting [X] diarrhea [ ] constipation [X] post-prandial pain	  Genitourinary:  [ ] dysuria [ ] frequency [ ] hematuria [ ] discharge [ ] flank pain  [ ] incontinence  Musculoskeletal:  [ ] myalgias [ ] arthralgias [ ] arthritis  [ ] back pain  Neurological:  [ ] headache [ ] seizures  [ ] confusion/altered mental status  Psychiatric:  [ ] anxiety [ ] depression	  Hematology/Lymphatics:  [ ] lymphadenopathy  Endocrine:  [ ] adrenal [ ] thyroid  Allergic/Immunologic:	 [ ] transplant [ ] seasonal    Vital Signs Last 24 Hrs  T(F): 99.3 (08-19-20 @ 10:08), Max: 99.3 (08-18-20 @ 19:30)    Vital Signs Last 24 Hrs  HR: 83 (08-19-20 @ 10:08) (74 - 104)  BP: 131/78 (08-19-20 @ 10:08) (110/69 - 146/82)  RR: 18 (08-19-20 @ 10:08)  SpO2: 99% (08-19-20 @ 10:08) (98% - 100%)  Wt(kg): --    PHYSICAL EXAM:  Constitutional: non-toxic, no distress  HEAD/EYES: anicteric, no conjunctival injection  ENT: L neck swollen, nonerythematous, and exquisitely tender to light palpation; oropharynx difficult to visualize due to pain upon opening mouth; neck supple  Cardiovascular:  normal S1, S2, no murmur, no edema  Chest: clear BS bilaterally, no wheezes, no rales; L upper chest with mediport nontender, nonerythematous.  GI: soft, non-tender, normal bowel sounds  : no chandler, no CVA tenderness  Musculoskeletal:  no synovitis, normal ROM  Neurologic: awake and alert, normal strength, no focal findings  Skin:  no rash, no erythema, no phlebitis  Heme/Onc: no lymphadenopathy   Psychiatric:  awake, alert, appropriate mood                 7.8    16.97 )-----------( 220      ( 19 Aug 2020 06:35 )             24.9       08-19    140  |  109<H>  |  10  ----------------------------<  117<H>  2.9<LL>   |  23  |  0.46<L>    Ca    7.4<L>      19 Aug 2020 06:35  Phos  2.6     08-19  Mg     1.5     08-19    TPro  7.9  /  Alb  4.4  /  TBili  0.1<L>  /  DBili  x   /  AST  16  /  ALT  11  /  AlkPhos  117  08-18          MICROBIOLOGY:          RADIOLOGY:    EXAM:  DUPLEX EXT VEINS UPPER LT                            PROCEDURE DATE:  08/19/2020    IMPRESSION:    There is acute left internal jugular and brachiocephalic vein DVT.    Findings discussed with LYNNETTE Murphy at 11:31 AM 8/19/2020      EXAM:  CT NECK SOFT TISSUE IC                          EXAM:  CT CHEST IC                            PROCEDURE DATE:  08/18/2020        IMPRESSION:  CT NECK:  1.  Long segment of occlusive deep venous thrombosis in the left internal jugular vein.  Inferiorly the thrombus slightly extends along the patient's medication port to the junction of the left internal jugular vein and left subclavian vein. The left brachiocephalic vein and SVC are grossly patent.  2.  Moderate retropharyngeal edema extending from the C1 level to the C7 level.  There is also edema tracking along the left carotid space and left sternocleidomastoid muscle to the lower neck/chest wall.  No discrete abscess.  3.  Nonspecific subcentimeter left-sided cervical lymph nodes.    CT CHEST: Approximately 3 x 2 cm right lower lobe opacity is suspicious for pneumonia however pulmonary metastasis is not excluded.  A follow-up chest CT is recommended in one month after antibiotic therapy to ensure resolution.    The major findings were discussed with Dr. Crocker 8/18/2020 10:01 PM by Dr. Mccallum with read back confirmation.            imaging below personally reviewed HPI per H&P:  45F with PMH of recently diagnosed R breast cancer (started on chemo Doxorubicin/cyclophosphamide in July, last session 8/6/20 via L chest wall mediport, Neulasta on 8/7/20) and HTN p/w L side neck pain and fullness x 5 days. Pt states she started noticing some L neck fullness 5 days ago, followed by pain and then over the weekend noted significant swelling. Swelling and pain have been getting progressively worse since that time; pain is pulling sensation, 10/10 at its worst, sometimes radiates to the back of her head and L sided temple, worse with movement of her head (abena towards th R). Denies any associated fevers, chills, inability to handle secretions, no SOB, no wheezing, no stridor, no CP, no erythema/swelling/pain at L mediport site. Endorses nausea/vomiting x 2 episode this past weekend, without associated abdominal pain; +diarrhea since chemo. On day of admission, pt also started noticing some L sided pain on swallowing and pain was more severe than prior, prompting ED visit. Denies any recent sick contact or recent illness, denies any preceeding throat infections, has poor dental hygiene. (19 Aug 2020 01:27)    CTAP revealed long segment of occlusive LIJ DVT, which extends to the level of mediport. Moderate retropharyngeal edema extending from the C1 to C7 level. Edema tracking along the L carotid space and L SCM muscle to the lower neck/chest wall w/o abscess. 3 x 2 cm R lower lobe opacity suspicious for pneumonia/pulm mets noted. Patient was started on Zosyn. ID was consulted for neck swelling.      prior hospital charts reviewed [X]  primary team notes reviewed [X]  other consultant notes reviewed [X]    PAST MEDICAL & SURGICAL HISTORY:  Breast cancer  H/O tubal ligation      Allergies  No Known Allergies        ANTIMICROBIALS:  piperacillin/tazobactam IVPB.. 3.375 every 8 hours      OTHER MEDS: MEDICATIONS  (STANDING):  acetaminophen   Tablet .. 650 every 6 hours PRN  amLODIPine   Tablet 5 daily  heparin   Injectable 6000 every 6 hours PRN  heparin   Injectable 3000 every 6 hours PRN  heparin  Infusion.  <Continuous>  morphine  - Injectable 4 every 6 hours PRN  ondansetron Injectable 4 every 6 hours PRN  oxyCODONE    IR 5 every 6 hours PRN    SOCIAL HISTORY:  1/2 pack a day for 20 years; intermittent EtOH uses; uses marijuana    FAMILY HISTORY:  No pertinent family history in first degree relatives    REVIEW OF SYSTEMS  [  ] ROS unobtainable because:    [X] All other systems negative except as noted below:	    Constitutional:  [ ] fever [ ] chills  [ ] weight loss  [ ] weakness  Skin:  [ ] rash [ ] phlebitis	  Eyes: [ ] icterus [ ] pain  [ ] discharge	  ENMT: [ ] sore throat  [ ] thrush [ ] ulcers [ ] exudates [X] neck pain  Respiratory: [ ] dyspnea [ ] hemoptysis [ ] cough [ ] sputum	  Cardiovascular:  [ ] chest pain [ ] palpitations [ ] edema	  Gastrointestinal:  [X] nausea [X] vomiting [X] diarrhea [ ] constipation [X] post-prandial pain	  Genitourinary:  [ ] dysuria [ ] frequency [ ] hematuria [ ] discharge [ ] flank pain  [ ] incontinence  Musculoskeletal:  [ ] myalgias [ ] arthralgias [ ] arthritis  [ ] back pain  Neurological:  [ ] headache [ ] seizures  [ ] confusion/altered mental status  Psychiatric:  [ ] anxiety [ ] depression	  Hematology/Lymphatics:  [ ] lymphadenopathy  Endocrine:  [ ] adrenal [ ] thyroid  Allergic/Immunologic:	 [ ] transplant [ ] seasonal    Vital Signs Last 24 Hrs  T(F): 99.3 (08-19-20 @ 10:08), Max: 99.3 (08-18-20 @ 19:30)    Vital Signs Last 24 Hrs  HR: 83 (08-19-20 @ 10:08) (74 - 104)  BP: 131/78 (08-19-20 @ 10:08) (110/69 - 146/82)  RR: 18 (08-19-20 @ 10:08)  SpO2: 99% (08-19-20 @ 10:08) (98% - 100%)  Wt(kg): --    PHYSICAL EXAM:  Constitutional: non-toxic, no distress  HEAD/EYES: anicteric, no conjunctival injection  ENT: L neck swollen, nonerythematous, and exquisitely tender to light palpation; oropharynx difficult to visualize due to pain upon opening mouth; neck supple  Cardiovascular:  normal S1, S2, no murmur, no edema  Chest: clear BS bilaterally, no wheezes, no rales; L upper chest with mediport nontender, nonerythematous.  GI: soft, non-tender, normal bowel sounds  : no chandler, no CVA tenderness  Musculoskeletal:  no synovitis, normal ROM  Neurologic: awake and alert, normal strength, no focal findings  Skin:  no rash, no erythema, no phlebitis  Heme/Onc: no lymphadenopathy   Psychiatric:  awake, alert, appropriate mood                 7.8    16.97 )-----------( 220      ( 19 Aug 2020 06:35 )             24.9       08-19    140  |  109<H>  |  10  ----------------------------<  117<H>  2.9<LL>   |  23  |  0.46<L>    Ca    7.4<L>      19 Aug 2020 06:35  Phos  2.6     08-19  Mg     1.5     08-19    TPro  7.9  /  Alb  4.4  /  TBili  0.1<L>  /  DBili  x   /  AST  16  /  ALT  11  /  AlkPhos  117  08-18          MICROBIOLOGY:  Blood cultures in lab       RADIOLOGY:  Personally reviewed:  EXAM:  DUPLEX EXT VEINS UPPER LT                        PROCEDURE DATE:  08/19/2020    IMPRESSION:    There is acute left internal jugular and brachiocephalic vein DVT.    Findings discussed with LYNNETTE Murphy at 11:31 AM 8/19/2020      EXAM:  CT NECK SOFT TISSUE IC                        EXAM:  CT CHEST IC                        PROCEDURE DATE:  08/18/2020    IMPRESSION:  CT NECK:  1.  Long segment of occlusive deep venous thrombosis in the left internal jugular vein.  Inferiorly the thrombus slightly extends along the patient's medication port to the junction of the left internal jugular vein and left subclavian vein. The left brachiocephalic vein and SVC are grossly patent.  2.  Moderate retropharyngeal edema extending from the C1 level to the C7 level.  There is also edema tracking along the left carotid space and left sternocleidomastoid muscle to the lower neck/chest wall.  No discrete abscess.  3.  Nonspecific subcentimeter left-sided cervical lymph nodes.  CT CHEST: Approximately 3 x 2 cm right lower lobe opacity is suspicious for pneumonia however pulmonary metastasis is not excluded.  A follow-up chest CT is recommended in one month after antibiotic therapy to ensure resolution.

## 2020-08-19 NOTE — CONSULT NOTE ADULT - PROBLEM SELECTOR RECOMMENDATION 2
- 3x2 cm RLL opacity noted on CT chest. Differentials include infection and metastatic process.  - Intermittent coughs. Clear sputum. - 3x2 cm RLL opacity noted on CT chest. Differentials include infection and metastatic process.  - Intermittent coughs. Clear sputum.  - Discussed with radiology regarding the RLL 3x2 cm opacity, unlikely from pneumonia.  - Monitor off abx as above - 3x2 cm RLL opacity noted on CT chest. Differentials include infection and metastatic process   - Intermittent coughs. Clear sputum.  - Discussed with radiology regarding the RLL 3x2 cm opacity, unlikely from pneumonia and more likely PE with lung infarct  - Monitor off abx as above

## 2020-08-19 NOTE — H&P ADULT - PROBLEM SELECTOR PLAN 5
1.  Name of PCP: Dr Brendon Khan  2.  PCP Contacted on Admission: [ ] Y    [ ] N    3.  PCP contacted at Discharge: [ ] Y    [ ] N    [ ] N/A  4.  Post-Discharge Appointment Date and Location:  5.  Summary of Handoff given to PCP: recently diagnosed R breast cancer, last chemo with doxorubicin and cyclophosphamide on 8/6/20 (next due 8/20/20); s/p neulasta on 8/7/20  f/u with oncology

## 2020-08-19 NOTE — H&P ADULT - PROBLEM SELECTOR PROBLEM 4
Malignant neoplasm of right female breast, unspecified estrogen receptor status, unspecified site of breast Edema of pharynx

## 2020-08-19 NOTE — ED ADULT NURSE REASSESSMENT NOTE - NS ED NURSE REASSESS COMMENT FT1
PT RTM medicine at this time. Heparin infusion started following nomogram as per MD orders. Pt educated on signs of bleeding, verbalizes understanding of symptoms. Pt updated on plan of care awaiting bed assignment. Safety and comfort measures maintained. Call bell within reach.

## 2020-08-19 NOTE — CONSULT NOTE ADULT - SUBJECTIVE AND OBJECTIVE BOX
CC: L neck sweling    HPI: 45F with PMH of recently diagnosed R breast cancer (started on chemo Doxorubicin/cyclophosphamide in July, last session 8/6/20 via L chest wall mediport, Neulasta on 8/7/20) and HTN p/w L side neck pain and fullness x 5 days. Pt states she started noticing some L neck fullness 5 days ago, followed by pain and then over the weekend noted significant swelling. Swelling and pain have been getting progressively worse since that time; pain is pulling sensation, 10/10 at its worst, sometimes radiates to the back of her head and L sided temple, worse with movement of her head (abena towards th R). Denies any associated fevers, chills, inability to handle secretions, no SOB, no wheezing, no stridor, no CP, no erythema/swelling/pain at L mediport site. Endorses nausea/vomiting x 2 episode this past weekend, without associated abdominal pain; +diarrhea since chemo. On day of admission, pt also started noticing some L sided pain on swallowing and pain was more severe than prior, prompting ED visit. Denies any recent sick contact or recent illness, denies any preceeding throat infections, has poor dental hygiene.     ENT consulted for upper airway evaluation      PAST MEDICAL & SURGICAL HISTORY:  Breast cancer  H/O tubal ligation    Allergies    No Known Allergies    Intolerances      MEDICATIONS  (STANDING):  amLODIPine   Tablet 5 milliGRAM(s) Oral daily  heparin  Infusion.  Unit(s)/Hr (13 mL/Hr) IV Continuous <Continuous>    MEDICATIONS  (PRN):  acetaminophen   Tablet .. 650 milliGRAM(s) Oral every 6 hours PRN Temp greater or equal to 38C (100.4F), Mild Pain (1 - 3)  heparin   Injectable 6000 Unit(s) IV Push every 6 hours PRN For aPTT less than 40  heparin   Injectable 3000 Unit(s) IV Push every 6 hours PRN For aPTT between 40 - 57  morphine  - Injectable 4 milliGRAM(s) IV Push every 6 hours PRN Severe Pain (7 - 10)  ondansetron Injectable 4 milliGRAM(s) IV Push every 6 hours PRN Nausea and/or Vomiting  oxyCODONE    IR 5 milliGRAM(s) Oral every 6 hours PRN Moderate Pain (4 - 6)      Social History: former smoker (quit this past week, 0.5PPD x 20years), no etoh, +smokes marijuana 3x/week   	lives at home with  and kids  currently unemployed, independent with ADLs    Family history:   No pertinent family history in first degree relatives      ROS:   ENT: all negative except as noted in HPI   Skin: No itching, dryness, rash, changes to hair, or skin masses  CV: denies palpitations  Pulm: denies SOB, cough, hemoptysis  GI: denies change in appetite, indigestion, n/v  : denies pertinent urinary symptoms, urgency  Neuro: denies numbness/tingling, loss of sensation  Psych: denies anxiety  MS: denies muscle weakness, instability  Heme: denies easy bruising or bleeding  Endo: denies heat/cold intolerance, excessive sweating  Vascular: denies LE edema    Vital Signs Last 24 Hrs  T(C): 36.7 (19 Aug 2020 14:10), Max: 37.4 (18 Aug 2020 19:30)  T(F): 98 (19 Aug 2020 14:10), Max: 99.3 (18 Aug 2020 19:30)  HR: 82 (19 Aug 2020 14:10) (74 - 88)  BP: 132/82 (19 Aug 2020 14:10) (124/76 - 146/82)  BP(mean): --  RR: 18 (19 Aug 2020 14:10) (16 - 18)  SpO2: 95% (19 Aug 2020 14:10) (95% - 100%)                          7.8    16.97 )-----------( 220      ( 19 Aug 2020 06:35 )             24.9    08-19    140  |  109<H>  |  10  ----------------------------<  117<H>  2.9<LL>   |  23  |  0.46<L>    Ca    7.4<L>      19 Aug 2020 06:35  Phos  2.6     08-19  Mg     1.5     08-19    TPro  7.9  /  Alb  4.4  /  TBili  0.1<L>  /  DBili  x   /  AST  16  /  ALT  11  /  AlkPhos  117  08-18   PT/INR - ( 18 Aug 2020 23:38 )   PT: 12.7 sec;   INR: 1.07 ratio         PTT - ( 19 Aug 2020 12:42 )  PTT:75.3 sec    PHYSICAL EXAM:  Gen: NAD  Skin: No rashes, bruises, or lesions  Head: Normocephalic, Atraumatic  Face: no edema, erythema, or fluctuance. Parotid glands soft without mass  Eyes: no scleral injection  Nose: Nares bilaterally patent, no discharge  Mouth: No Stridor / Drooling / Trismus.  Mucosa moist, tongue/uvula midline, oropharynx clear  Neck: Flat, supple, no lymphadenopathy, trachea midline, no masses  Lymphatic: No lymphadenopathy  Resp: breathing easily, no stridor  CV: no peripheral edema/cyanosis  GI: nondistended   Peripheral vascular: no JVD or edema  Neuro: facial nerve intact, no facial droop    Fiberoptic Indirect laryngoscopy:  (Scope #2 used)  Reason for Laryngoscopy: L IJ thrombus, retropharyngeal edema    Patient was unable to cooperate with mirror.  Nasopharynx, oropharynx, and hypopharynx clear, no bleeding. Tongue base, vallecula, epiglottis, and subglottis appear normal. +L posterolateral pharyngeal fullness, non obstructing. No erythema, pooling of secretions, masses or lesions. Airway patent, no foreign body visualized. No glottic/supraglottic edema. True vocal cords, arytenoids, vestibular folds, ventricles, pyriform sinuses, and aryepiglottic folds appear normal bilaterally. Vocal cords mobile with good contact b/l.      IMAGING/ADDITIONAL STUDIES:   EXAM: CT NECK SOFT TISSUE IC     EXAM: CT CHEST IC       PROCEDURE DATE: 08/18/2020     INTERPRETATION: CLINICAL INFORMATION: History of breast cancer presenting with left-sided neck pain and fullness. Leukocytosis.     COMPARISON: None.     PROCEDURE:   1. CT of the neck soft tissues was performed with intravenous contrast. Sagittal and coronal reformats were generated.   2. CT of the Chest was performed with intravenous contrast. Sagittal and coronal reformats were generated   Intravenous contrast: 90 cc Omnipaque 300 were administered; 10 cc discarded.    FINDINGS:     CT NECK:     There is mild enlargement the adenoids. The epiglottis, supraglottic airway, larynx and upper trachea appear within normal limits. There is moderate retropharyngeal edema extending from C1 level to the C7 level. Edema tracks along the left carotid space and left submandibular muscle, and probably tracks along the strap muscles to the lower neck/chest wall.. There is diffuse enlargement and long segment of occlusive deep venous thrombosis within the left internal jugular vein.     There are nonspecific subcentimeter left sided lymph nodes measuring up to 7 mm short access at left level IIa (4:31), 6 mm short access at left level IIb (4:22) 6 x 5 mm at left level IV (4:53), and 8 x 4 mm at left level Vb (4:40).     The parotid glands, submandibular glands and thyroid appear unremarkable.     There is minimal mucosal thickening in the ethmoid air cells, sphenoid sinuses and maxillary sinuses without air-fluid level.     The visualized tympanomastoid cavities are grossly clear.     There is straightening of the cervical lordosis with slight reversal at C5-C6. At C5-C6, there is moderate to severe loss of intervertebral disc space height, a small disc osteophyte complex and bilateral uncovertebral hypertrophy associated with mild spinal canal stenosis, moderate left neural foraminal narrowing and mild right neural foraminal narrowing.     CT CHEST:     LUNGS AND AIRWAYS: Patent central airways. There is an approximately 3 x 2 cm opacity in the right lower lobe (2:110). Right upper lobe calcified granuloma (3:54).   PLEURA: No pleural effusion.   MEDIASTINUM AND ANURAG: No lymphadenopathy.   VESSELS: There is a left chest wall medication port terminating in the right atrium. Again seen is thrombosis of the left internal jugular vein. Thrombus slightly extends along the medication port into the junction of the left internal jugular vein subclavian vein. The left brachiocephalic vein and SVC appear patent.   HEART: Heart size is normal. No pericardial effusion.   CHEST WALL: Within normal limits.   VISUALIZED UPPER ABDOMEN: Within normal limits.   BONES: Within normal limits.     IMPRESSION:   CT NECK:   1. Long segment of occlusive deep venous thrombosis in the left internal jugular vein. Inferiorly the thrombus slightly extends along the patient's medication port to the junction of the left internal jugular vein and left subclavian vein. The left brachiocephalic vein and SVC are grossly patent.   2. Moderate retropharyngeal edema extending from the C1 level to the C7 level. There is also edema tracking along the left carotid space and left sternocleidomastoid muscle to the lower neck/chest wall. No discrete abscess.   3. Nonspecific subcentimeter left-sided cervical lymph nodes.     CT CHEST: Approximately 3 x 2 cm right lower lobe opacity is suspicious for pneumonia however pulmonary metastasis is not excluded. A follow-up chest CT is recommended in one month after antibiotic therapy to ensure resolution.

## 2020-08-19 NOTE — PROGRESS NOTE ADULT - SUBJECTIVE AND OBJECTIVE BOX
[facility] Division of Hospital Medicine  Brenda Curran MD  Pager (M-F, 8A-5P): [phone]  Other Times:  [phone]    Patient is a 45y old  Female who presents with a chief complaint of L neck pain and fullness (19 Aug 2020 12:27)      SUBJECTIVE / OVERNIGHT EVENTS:  Pt seen and examined at bedside.  This AM Hgb dropped to 7.6, repeat 7.8, from 11  Pt reporting severe L sided neck pain moderately controlled on current regimen  Otherwise remains afebrile and hemodynamically stable. Reports some difficulty swallowing pills and slight drooling when drinking water, but not hypoxic.  ROS otherwise negative    MEDICATIONS  (STANDING):  amLODIPine   Tablet 5 milliGRAM(s) Oral daily  heparin  Infusion.  Unit(s)/Hr (13 mL/Hr) IV Continuous <Continuous>  piperacillin/tazobactam IVPB.. 3.375 Gram(s) IV Intermittent every 8 hours  sodium chloride 0.9%. 1000 milliLiter(s) (100 mL/Hr) IV Continuous <Continuous>    MEDICATIONS  (PRN):  acetaminophen   Tablet .. 650 milliGRAM(s) Oral every 6 hours PRN Temp greater or equal to 38C (100.4F), Mild Pain (1 - 3)  heparin   Injectable 6000 Unit(s) IV Push every 6 hours PRN For aPTT less than 40  heparin   Injectable 3000 Unit(s) IV Push every 6 hours PRN For aPTT between 40 - 57  morphine  - Injectable 4 milliGRAM(s) IV Push every 6 hours PRN Severe Pain (7 - 10)  ondansetron Injectable 4 milliGRAM(s) IV Push every 6 hours PRN Nausea and/or Vomiting  oxyCODONE    IR 5 milliGRAM(s) Oral every 6 hours PRN Moderate Pain (4 - 6)      PHYSICAL EXAM:  Vital Signs Last 24 Hrs  T(C): 37.4 (19 Aug 2020 10:08), Max: 37.4 (18 Aug 2020 19:30)  T(F): 99.3 (19 Aug 2020 10:08), Max: 99.3 (18 Aug 2020 19:30)  HR: 83 (19 Aug 2020 10:08) (74 - 104)  BP: 131/78 (19 Aug 2020 10:08) (110/69 - 146/82)  BP(mean): --  RR: 18 (19 Aug 2020 10:08) (16 - 18)  SpO2: 99% (19 Aug 2020 10:08) (98% - 100%)    CONSTITUTIONAL: NAD, well-developed  EYES: PERRLA; conjunctiva and sclera clear  ENMT: Moist oral mucosa, no pharyngeal injection or exudates; no tonsilar swelling  NECK: Supple, L sided swelling and significant tenderness; +lymphadenopathy; L upper chest port  RESPIRATORY: Normal respiratory effort; lungs are clear to auscultation bilaterally, no wheezing  CARDIOVASCULAR: Regular rate and rhythm, normal S1 and S2, no murmur/rub/gallop; No lower extremity edema; Peripheral pulses are 2+ bilaterally  ABDOMEN: Nontender to palpation, normoactive bowel sounds, no rebound/guarding  MUSCULOSKELETAL:  Normal gait; no clubbing or cyanosis of digits; no joint swelling or tenderness to palpation  PSYCH: A+O to person, place, and time; affect appropriate  NEUROLOGY: CN 2-12 are intact and symmetric; no gross sensory deficits   SKIN: No rashes; no palpable lesions    LABS:                        7.8    16.97 )-----------( 220      ( 19 Aug 2020 06:35 )             24.9     08-19    140  |  109<H>  |  10  ----------------------------<  117<H>  2.9<LL>   |  23  |  0.46<L>    Ca    7.4<L>      19 Aug 2020 06:35  Phos  2.6     08-19  Mg     1.5     08-19    TPro  7.9  /  Alb  4.4  /  TBili  0.1<L>  /  DBili  x   /  AST  16  /  ALT  11  /  AlkPhos  117  08-18    PT/INR - ( 18 Aug 2020 23:38 )   PT: 12.7 sec;   INR: 1.07 ratio         PTT - ( 19 Aug 2020 12:42 )  PTT:75.3 sec

## 2020-08-19 NOTE — H&P ADULT - NSHPPHYSICALEXAM_GEN_ALL_CORE
Vital Signs Last 24 Hrs  T(C): 36.9 (19 Aug 2020 01:13), Max: 37.4 (18 Aug 2020 19:30)  T(F): 98.4 (19 Aug 2020 01:13), Max: 99.3 (18 Aug 2020 19:30)  HR: 88 (19 Aug 2020 01:13) (74 - 104)  BP: 144/75 (19 Aug 2020 01:13) (110/69 - 144/75)  BP(mean): --  RR: 18 (19 Aug 2020 01:13) (16 - 18)  SpO2: 98% (19 Aug 2020 01:13) (98% - 100%)    PHYSICAL EXAM:  GENERAL: NAD, well-developed  HEAD:  Atraumatic, normocephalic  EYES: EOMI, conjunctiva and sclera clear  NECK: L sided fullness and swelling, +tender on exam, +ROM of neck but with pain   CHEST/LUNG: Clear to auscultation bilaterally; no wheezing or rales  HEART: Regular rate and rhythm; no murmurs  ABDOMEN: Soft, nontender, nondistended; bowel sounds present  EXTREMITIES:  2+ Peripheral Pulses, no edema  PSYCH: calm affect, not anxious  NEUROLOGY: non-focal, AAOx3  SKIN: No rashes or lesions  MUSCULOSKELETAL: no back pain, moving all extremities

## 2020-08-19 NOTE — CONSULT NOTE ADULT - ASSESSMENT
46 yo female w new breast CA dx found to have L IJ thrombosis with adjacent L retropharyngeal edema. Laryngoscopy revealed mild non obstructing asymmetry of hypopharynx

## 2020-08-19 NOTE — PROGRESS NOTE ADULT - PROBLEM SELECTOR PLAN 1
Seen on CT neck and U/S  -No intervention per IR and vascular team  -Cont heparin gtt; if no intervention planned, will likely switch to lovenox

## 2020-08-19 NOTE — H&P ADULT - PROBLEM SELECTOR PLAN 2
septic with leukocytosis of 20 and tachycardia on admission with possible RLL PNA. Pt also with retropharyngeal edema without discrete abscess which could be 2/2 DVT but may also be nidus of infection. Could also be reactive in setting of acute DVT, recent Neulasta    will treat with zosyn for now to coverage anaerobes - monitor Cr/CBC   f/u blood cultures   lactate normal on admission  trend CBC

## 2020-08-19 NOTE — CONSULT NOTE ADULT - ATTENDING COMMENTS
Pt seen and examined with team at time of service, I was physically present for the key portions for evaluation and management (E/M) service provided, and preformed key portions of the procedure. Agree with above. Plan discussed with primary team.    -Recommend treating with broad spectrum abx - pt with edema, pain on head movement, pain on swallowing, elevated WBC, and while she does not have severe sx or limitation of head movement an infection in this area can be very severe
Low clinical concern for superimposed infection of her IJ thrombus. Suspect related to her port and hypercoagulable state from breast cancer.   Low clinical or radiographic concern for pneumonia. On review with radiology more likely a PE with infarct.   Monitor off antibiotics   f/u blood cultures    Yosef Mccallum MD   Infectious Disease   Pager [phone]   After 5PM and on weekends please page fellow on call or call [phone]

## 2020-08-19 NOTE — PROGRESS NOTE ADULT - PROBLEM SELECTOR PLAN 2
Retropharyngeal edema seen on CT; concern for infection  -Leukocytosis in setting of neulasta, last dose 2 weeks ago  -continue zosyn for now  -ID and ENT consulted; re: need for steroids?  -monitor closely for airway compromise; low threshold for ICU consult

## 2020-08-19 NOTE — CONSULT NOTE ADULT - PROBLEM SELECTOR RECOMMENDATION 9
-Recommend treating with abx  -ENT will continue to follow - recommend abx   -ENT will continue to follow

## 2020-08-19 NOTE — CONSULT NOTE ADULT - SUBJECTIVE AND OBJECTIVE BOX
----------------------------------------------------------  Interventional Radiology Brief Consult Note  -----------------------------------------------------------    Reason for Referral:  Left IJ DVT seen on recent CT of the Neck    Clinical Summary: 45F with PMH of recently diagnosed R breast cancer (started on chemo Doxorubicin/cyclophosphamide in July, last session 8/6/20 via L chest wall mediport, Neulasta on 8/7/20) and HTN p/w L side neck pain and fullness x 5 days. Pt states she started noticing some L neck fullness 5 days ago, followed by pain and then over the weekend noted significant swelling. Swelling and pain have been getting progressively worse since that time; pain is pulling sensation, 10/10 at its worst, sometimes radiates to the back of her head and L sided temple, worse with movement of her head (abena towards th R). Denies any associated fevers, chills, inability to handle secretions, no SOB, no wheezing, no stridor, no CP, no erythema/swelling/pain at L mediport site. Endorses nausea/vomiting x 2 episode this past weekend, without associated abdominal pain; +diarrhea since chemo. On day of admission, pt also started noticing some L sided pain on swallowing and pain was more severe than prior, prompting ED visit. Denies any recent sick contact or recent illness, denies any preceeding throat infections, has poor dental hygiene.       Vitals:  T(C): 37.4 (08-19-20 @ 10:08), Max: 37.4 (08-18-20 @ 19:30)  HR: 83 (08-19-20 @ 10:08) (74 - 104)  BP: 131/78 (08-19-20 @ 10:08) (110/69 - 146/82)  RR: 18 (08-19-20 @ 10:08) (16 - 18)  SpO2: 99% (08-19-20 @ 10:08) (98% - 100%)    Labs:           7.8  16.97)-----(220     (08-19-20 @ 06:35)         24.9     140 | 109 | 10  --------------------< 117     (08-19-20 @ 06:35)  2.9 | 23 | 0.46       PT: -- 08-19-20 @ 06:35  aPTT: 74.7<H> 08-19-20 @ 06:35   INR: -- 08-19-20 @ 06:35      Assessment: 45y Female with Left IJ thrombus seen on recent imaging of the neck.    Recommendations:  - No intervention at this time   - Continue Anticoagulation   - D/w Dr. Oreilly ----------------------------------------------------------  Interventional Radiology Brief Consult Note  -----------------------------------------------------------    Reason for Referral:  Left IJ DVT seen on recent CT of the Neck    Clinical Summary: 45F with PMH of recently diagnosed R breast cancer (started on chemo Doxorubicin/cyclophosphamide in July, last session 8/6/20 via L chest wall mediport, Neulasta on 8/7/20) and HTN p/w L side neck pain and fullness x 5 days. Pt states she started noticing some L neck fullness 5 days ago, followed by pain and then over the weekend noted significant swelling. Swelling and pain have been getting progressively worse since that time; pain is pulling sensation, 10/10 at its worst, sometimes radiates to the back of her head and L sided temple, worse with movement of her head (abena towards th R). Denies any associated fevers, chills, inability to handle secretions, no SOB, no wheezing, no stridor, no CP, no erythema/swelling/pain at L mediport site. Endorses nausea/vomiting x 2 episode this past weekend, without associated abdominal pain; +diarrhea since chemo. On day of admission, pt also started noticing some L sided pain on swallowing and pain was more severe than prior, prompting ED visit. Denies any recent sick contact or recent illness, denies any preceeding throat infections, has poor dental hygiene.       Vitals:  T(C): 37.4 (08-19-20 @ 10:08), Max: 37.4 (08-18-20 @ 19:30)  HR: 83 (08-19-20 @ 10:08) (74 - 104)  BP: 131/78 (08-19-20 @ 10:08) (110/69 - 146/82)  RR: 18 (08-19-20 @ 10:08) (16 - 18)  SpO2: 99% (08-19-20 @ 10:08) (98% - 100%)    Labs:           7.8  16.97)-----(220     (08-19-20 @ 06:35)         24.9     140 | 109 | 10  --------------------< 117     (08-19-20 @ 06:35)  2.9 | 23 | 0.46       PT: -- 08-19-20 @ 06:35  aPTT: 74.7<H> 08-19-20 @ 06:35   INR: -- 08-19-20 @ 06:35      Assessment: 45y Female with Left IJ thrombus seen on recent imaging of the neck.    Recommendations:  - Given numerous venous neck collaterals, no intervention indicated at this time   - Continue therapeutic anticoagulation   - D/w Dr. Oreilly

## 2020-08-19 NOTE — H&P ADULT - PROBLEM SELECTOR PLAN 7
1.  Name of PCP: Dr Brendon Khan  2.  PCP Contacted on Admission: [ ] Y    [ ] N    3.  PCP contacted at Discharge: [ ] Y    [ ] N    [ ] N/A  4.  Post-Discharge Appointment Date and Location:  5.  Summary of Handoff given to PCP:

## 2020-08-19 NOTE — H&P ADULT - NSHPLABSRESULTS_GEN_ALL_CORE
Labs, imaging and EKG personally reviewed and interpreted by me.                           11.0   20.50 )-----------( 296      ( 18 Aug 2020 18:30 )             34.5     08-18    140  |  104  |  15  ----------------------------<  102<H>  3.6   |  25  |  0.55    Ca    9.8      18 Aug 2020 18:30    TPro  7.9  /  Alb  4.4  /  TBili  0.1<L>  /  DBili  x   /  AST  16  /  ALT  11  /  AlkPhos  117  08-18      PT/INR - ( 18 Aug 2020 23:38 )   PT: 12.7 sec;   INR: 1.07 ratio    PTT - ( 18 Aug 2020 23:38 )  PTT:28.6 sec      < from: CT Neck Soft Tissue w/ IV Cont (08.18.20 @ 21:55) >  IMPRESSION:  CT NECK:  1.  Long segment of occlusive deep venous thrombosis in the left internal jugular vein.  Inferiorly the thrombus slightly extends along the patient's medication port to the junction of the left internal jugular vein and left subclavian vein. The left brachiocephalic vein and SVC are grossly patent.  2.  Moderate retropharyngeal edema extending from the C1 level to the C7 level.  There is also edema tracking along the left carotid space and left sternocleidomastoid muscle to the lower neck/chest wall.  No discrete abscess.  3.  Nonspecific subcentimeter left-sided cervical lymph nodes.    CT CHEST: Approximately 3 x 2 cm right lower lobe opacity is suspicious for pneumonia however pulmonary metastasis is not excluded.  A follow-up chest CT is recommended in one month after antibiotic therapy to ensure resolution.

## 2020-08-20 LAB
ANION GAP SERPL CALC-SCNC: 10 MMOL/L — SIGNIFICANT CHANGE UP (ref 5–17)
APTT BLD: 56.1 SEC — HIGH (ref 27.5–35.5)
APTT BLD: 70.2 SEC — HIGH (ref 27.5–35.5)
APTT BLD: 98.1 SEC — HIGH (ref 27.5–35.5)
BUN SERPL-MCNC: 6 MG/DL — LOW (ref 7–23)
CALCIUM SERPL-MCNC: 9.5 MG/DL — SIGNIFICANT CHANGE UP (ref 8.4–10.5)
CHLORIDE SERPL-SCNC: 100 MMOL/L — SIGNIFICANT CHANGE UP (ref 96–108)
CO2 SERPL-SCNC: 27 MMOL/L — SIGNIFICANT CHANGE UP (ref 22–31)
CREAT SERPL-MCNC: 0.46 MG/DL — LOW (ref 0.5–1.3)
GLUCOSE SERPL-MCNC: 109 MG/DL — HIGH (ref 70–99)
HCT VFR BLD CALC: 29.6 % — LOW (ref 34.5–45)
HGB BLD-MCNC: 9.7 G/DL — LOW (ref 11.5–15.5)
MCHC RBC-ENTMCNC: 29 PG — SIGNIFICANT CHANGE UP (ref 27–34)
MCHC RBC-ENTMCNC: 32.8 GM/DL — SIGNIFICANT CHANGE UP (ref 32–36)
MCV RBC AUTO: 88.4 FL — SIGNIFICANT CHANGE UP (ref 80–100)
NRBC # BLD: 0 /100 WBCS — SIGNIFICANT CHANGE UP (ref 0–0)
PLATELET # BLD AUTO: 279 K/UL — SIGNIFICANT CHANGE UP (ref 150–400)
POTASSIUM SERPL-MCNC: 3.4 MMOL/L — LOW (ref 3.5–5.3)
POTASSIUM SERPL-SCNC: 3.4 MMOL/L — LOW (ref 3.5–5.3)
RBC # BLD: 3.35 M/UL — LOW (ref 3.8–5.2)
RBC # FLD: 15.3 % — HIGH (ref 10.3–14.5)
SODIUM SERPL-SCNC: 137 MMOL/L — SIGNIFICANT CHANGE UP (ref 135–145)
WBC # BLD: 19.22 K/UL — HIGH (ref 3.8–10.5)
WBC # FLD AUTO: 19.22 K/UL — HIGH (ref 3.8–10.5)

## 2020-08-20 PROCEDURE — 99232 SBSQ HOSP IP/OBS MODERATE 35: CPT | Mod: 25

## 2020-08-20 PROCEDURE — 99233 SBSQ HOSP IP/OBS HIGH 50: CPT

## 2020-08-20 PROCEDURE — 31575 DIAGNOSTIC LARYNGOSCOPY: CPT

## 2020-08-20 RX ORDER — MORPHINE SULFATE 50 MG/1
2 CAPSULE, EXTENDED RELEASE ORAL ONCE
Refills: 0 | Status: DISCONTINUED | OUTPATIENT
Start: 2020-08-20 | End: 2020-08-20

## 2020-08-20 RX ORDER — KETOROLAC TROMETHAMINE 30 MG/ML
15 SYRINGE (ML) INJECTION EVERY 8 HOURS
Refills: 0 | Status: DISCONTINUED | OUTPATIENT
Start: 2020-08-20 | End: 2020-08-21

## 2020-08-20 RX ORDER — POTASSIUM CHLORIDE 20 MEQ
10 PACKET (EA) ORAL
Refills: 0 | Status: DISCONTINUED | OUTPATIENT
Start: 2020-08-20 | End: 2020-08-20

## 2020-08-20 RX ORDER — PIPERACILLIN AND TAZOBACTAM 4; .5 G/20ML; G/20ML
3.38 INJECTION, POWDER, LYOPHILIZED, FOR SOLUTION INTRAVENOUS EVERY 8 HOURS
Refills: 0 | Status: DISCONTINUED | OUTPATIENT
Start: 2020-08-20 | End: 2020-08-22

## 2020-08-20 RX ORDER — POTASSIUM CHLORIDE 20 MEQ
40 PACKET (EA) ORAL ONCE
Refills: 0 | Status: COMPLETED | OUTPATIENT
Start: 2020-08-20 | End: 2020-08-20

## 2020-08-20 RX ORDER — DEXAMETHASONE 0.5 MG/5ML
6 ELIXIR ORAL EVERY 8 HOURS
Refills: 0 | Status: DISCONTINUED | OUTPATIENT
Start: 2020-08-20 | End: 2020-08-21

## 2020-08-20 RX ORDER — IBUPROFEN 200 MG
600 TABLET ORAL EVERY 8 HOURS
Refills: 0 | Status: DISCONTINUED | OUTPATIENT
Start: 2020-08-20 | End: 2020-08-20

## 2020-08-20 RX ORDER — DEXAMETHASONE 0.5 MG/5ML
6 ELIXIR ORAL EVERY 8 HOURS
Refills: 0 | Status: DISCONTINUED | OUTPATIENT
Start: 2020-08-20 | End: 2020-08-20

## 2020-08-20 RX ADMIN — PIPERACILLIN AND TAZOBACTAM 25 GRAM(S): 4; .5 INJECTION, POWDER, LYOPHILIZED, FOR SOLUTION INTRAVENOUS at 22:08

## 2020-08-20 RX ADMIN — Medication 40 MILLIEQUIVALENT(S): at 17:15

## 2020-08-20 RX ADMIN — Medication 15 MILLIGRAM(S): at 10:07

## 2020-08-20 RX ADMIN — Medication 15 MILLIGRAM(S): at 16:50

## 2020-08-20 RX ADMIN — HEPARIN SODIUM 1500 UNIT(S)/HR: 5000 INJECTION INTRAVENOUS; SUBCUTANEOUS at 22:50

## 2020-08-20 RX ADMIN — OXYCODONE HYDROCHLORIDE 5 MILLIGRAM(S): 5 TABLET ORAL at 03:08

## 2020-08-20 RX ADMIN — AMLODIPINE BESYLATE 5 MILLIGRAM(S): 2.5 TABLET ORAL at 06:28

## 2020-08-20 RX ADMIN — MORPHINE SULFATE 2 MILLIGRAM(S): 50 CAPSULE, EXTENDED RELEASE ORAL at 00:57

## 2020-08-20 RX ADMIN — HEPARIN SODIUM 3000 UNIT(S): 5000 INJECTION INTRAVENOUS; SUBCUTANEOUS at 08:00

## 2020-08-20 RX ADMIN — Medication 650 MILLIGRAM(S): at 06:28

## 2020-08-20 RX ADMIN — OXYCODONE HYDROCHLORIDE 5 MILLIGRAM(S): 5 TABLET ORAL at 03:38

## 2020-08-20 RX ADMIN — Medication 6 MILLIGRAM(S): at 08:57

## 2020-08-20 RX ADMIN — MORPHINE SULFATE 2 MILLIGRAM(S): 50 CAPSULE, EXTENDED RELEASE ORAL at 01:15

## 2020-08-20 RX ADMIN — HEPARIN SODIUM 1500 UNIT(S)/HR: 5000 INJECTION INTRAVENOUS; SUBCUTANEOUS at 15:19

## 2020-08-20 RX ADMIN — Medication 650 MILLIGRAM(S): at 07:00

## 2020-08-20 RX ADMIN — Medication 6 MILLIGRAM(S): at 16:50

## 2020-08-20 RX ADMIN — HEPARIN SODIUM 1500 UNIT(S)/HR: 5000 INJECTION INTRAVENOUS; SUBCUTANEOUS at 07:59

## 2020-08-20 RX ADMIN — ONDANSETRON 4 MILLIGRAM(S): 8 TABLET, FILM COATED ORAL at 10:15

## 2020-08-20 RX ADMIN — PIPERACILLIN AND TAZOBACTAM 25 GRAM(S): 4; .5 INJECTION, POWDER, LYOPHILIZED, FOR SOLUTION INTRAVENOUS at 13:12

## 2020-08-20 NOTE — PROGRESS NOTE ADULT - SUBJECTIVE AND OBJECTIVE BOX
[facility] Division of Hospital Medicine  Brenda Curran MD  Pager (M-F, 8A-5P): [phone]  Other Times:  [phone]    Patient is a 45y old  Female who presents with a chief complaint of L neck pain and fullness (19 Aug 2020 12:27)      SUBJECTIVE / OVERNIGHT EVENTS:  Pt seen and examined at bedside. Last night was in severe pain and reported worsening of L sided swelling, inability to swallow pills. Otherwise afebrile and hemodynamically stable.    MEDICATIONS  (STANDING):  amLODIPine   Tablet 5 milliGRAM(s) Oral daily  dexAMETHasone  Injectable 6 milliGRAM(s) IV Push every 8 hours  heparin  Infusion.  Unit(s)/Hr (13 mL/Hr) IV Continuous <Continuous>  ketorolac   Injectable 15 milliGRAM(s) IV Push every 8 hours  piperacillin/tazobactam IVPB.. 3.375 Gram(s) IV Intermittent every 8 hours    MEDICATIONS  (PRN):  acetaminophen   Tablet .. 650 milliGRAM(s) Oral every 6 hours PRN Temp greater or equal to 38C (100.4F), Mild Pain (1 - 3)  heparin   Injectable 6000 Unit(s) IV Push every 6 hours PRN For aPTT less than 40  heparin   Injectable 3000 Unit(s) IV Push every 6 hours PRN For aPTT between 40 - 57  morphine  - Injectable 4 milliGRAM(s) IV Push every 6 hours PRN Severe Pain (7 - 10)  ondansetron Injectable 4 milliGRAM(s) IV Push every 6 hours PRN Nausea and/or Vomiting  oxyCODONE    IR 5 milliGRAM(s) Oral every 6 hours PRN Moderate Pain (4 - 6)    VITAL SIGNS  T(C): 37 (08-20-20 @ 10:14), Max: 37.6 (08-19-20 @ 21:09)  T(F): 98.6 (08-20-20 @ 10:14), Max: 99.7 (08-19-20 @ 21:09)  HR: 67 (08-20-20 @ 10:14) (67 - 86)  BP: 129/81 (08-20-20 @ 10:14) (126/75 - 148/81)  RR: 18 (08-20-20 @ 10:14) (18 - 18)  SpO2: 99% (08-20-20 @ 10:14) (95% - 100%)  Wt(kg): --    CONSTITUTIONAL: NAD, well-developed  EYES: PERRLA; conjunctiva and sclera clear  ENMT: Moist oral mucosa, no pharyngeal injection or exudates; no tonsilar swelling  NECK: Supple, L sided swelling and significant tenderness; +lymphadenopathy; L upper chest port  RESPIRATORY: Normal respiratory effort; lungs are clear to auscultation bilaterally, no wheezing  CARDIOVASCULAR: Regular rate and rhythm, normal S1 and S2, no murmur/rub/gallop; No lower extremity edema; Peripheral pulses are 2+ bilaterally  ABDOMEN: Nontender to palpation, normoactive bowel sounds, no rebound/guarding  MUSCULOSKELETAL:  Normal gait; no clubbing or cyanosis of digits; no joint swelling or tenderness to palpation  PSYCH: A+O to person, place, and time; affect appropriate  NEUROLOGY: CN 2-12 are intact and symmetric; no gross sensory deficits   SKIN: No rashes; no palpable lesions    LABS: reviewed                         9.7    19.22 )-----------( 279      ( 20 Aug 2020 07:17 )             29.6       08-20    137  |  100  |  6<L>  ----------------------------<  109<H>  3.4<L>   |  27  |  0.46<L>    Ca    9.5      20 Aug 2020 07:17  Phos  2.6     08-19  Mg     1.7     08-19    TPro  7.9  /  Alb  4.4  /  TBili  0.1<L>  /  DBili  x   /  AST  16  /  ALT  11  /  AlkPhos  117  08-18      PT/INR - ( 18 Aug 2020 23:38 )   PT: 12.7 sec;   INR: 1.07 ratio         PTT - ( 20 Aug 2020 07:17 )  PTT:56.1 sec        IMAGING reviewed    EXAM:  DUPLEX EXT VEINS UPPER LT                            PROCEDURE DATE:  08/19/2020            INTERPRETATION:  CLINICAL INFORMATION: Left internal jugular occlusion found on a CT examination, dated 8/18/2020. History of a left IJ Mediport catheter. Left neck swelling.    TECHNIQUE: Duplex sonography of the LEFT UPPER extremity veins with color and spectral Doppler, with and without compression.    FINDINGS:    There is occlusive, echogenic thrombus affecting the left internal jugular vein and left brachiocephalic vein.    The medial left subclavian vein may also be thrombosed.    The lateral left subclavian, axillary, and left brachial veins are patent and without thrombus.    The left basilic and cephalic veins, superficial veins, are patent and free of thrombus.    IMPRESSION:    There is acute left internal jugular and brachiocephalic vein DVT.

## 2020-08-20 NOTE — PROGRESS NOTE ADULT - PROBLEM SELECTOR PLAN 2
Retropharyngeal edema seen on CT; concern for infection vs inflammation  -Leukocytosis in setting of neulasta, last dose 2 weeks ago  -ID recs appreciated: no evidence of infection, recommend to d/c antibiotics; however given immunocompromised status and pt's acuity, will continue zosyn for now  -ENT consulted; awaiting further recs  -Dacadron 6mg IV Q8H for now; plan discussed in detail with ENT and pt's oncologist Dr Woods who is on board with initiating steroids  -pain management with warm compress, toradol 15mg Q8H X 24 hrs, and morphine/oxycodone PRN  -monitor closely for airway compromise; low threshold for ICU consult

## 2020-08-20 NOTE — CHART NOTE - NSCHARTNOTEFT_GEN_A_CORE
Discussed with medicine attending use of steroids as pt w worsening neck swelling and pain overnight. Would not recommend given steroids without empiric antibiotic coverage as pt with a white count (19 today from 22 yesterday). Will discuss further with oncology and infectious disease.    ENT spectra [id number] Discussed with medicine attending use of steroids as pt w worsening neck swelling and pain overnight. Would not recommend giving steroids without empiric antibiotic coverage as pt with a white count (19 today from 22 yesterday). Will discuss further with oncology and infectious disease.    Pt rescoped    Nasopharynx, oropharynx, and hypopharynx clear, no bleeding. Tongue base, vallecula, epiglottis, and subglottis appear normal. No erythema, edema, pooling of secretions, masses or lesions. Airway patent, no foreign body visualized. No glottic/supraglottic edema. + worsened L posterolateral pharyngeal wall fullness. True vocal cords, arytenoids, vestibular folds, ventricles, pyriform sinuses, and aryepiglottic folds appear normal bilaterally. Vocal cords mobile with good contact b/l.      Assessment:  Slightly worsened L posterolateral pharyngeal wall fullness  L neck more swollen  1 dose of decadron given, pt restarted on zosyn for empiric coverage  ENT will continue to follow    ENT spectra [id number]

## 2020-08-20 NOTE — PROGRESS NOTE ADULT - SUBJECTIVE AND OBJECTIVE BOX
ENT ISSUE/POD: L neck swelling    HPI: 46 yo female w newly diagnosed breast CA found to have L IJ thrombus. Pt with L neck swelling, notes increase in pain and swelling overnight making swallowing slightly difficult, finsds mild relief with heat packs. Otherwise denies changes/difficulty in breathing, change in voice, sore throat, difficulty toleraitng secretions, headache, vision changes        PAST MEDICAL & SURGICAL HISTORY:  Breast cancer  No pertinent past medical history  H/O tubal ligation  No significant past surgical history    Allergies    No Known Allergies    Intolerances      MEDICATIONS  (STANDING):  amLODIPine   Tablet 5 milliGRAM(s) Oral daily  heparin  Infusion.  Unit(s)/Hr (13 mL/Hr) IV Continuous <Continuous>    MEDICATIONS  (PRN):  acetaminophen   Tablet .. 650 milliGRAM(s) Oral every 6 hours PRN Temp greater or equal to 38C (100.4F), Mild Pain (1 - 3)  heparin   Injectable 6000 Unit(s) IV Push every 6 hours PRN For aPTT less than 40  heparin   Injectable 3000 Unit(s) IV Push every 6 hours PRN For aPTT between 40 - 57  morphine  - Injectable 4 milliGRAM(s) IV Push every 6 hours PRN Severe Pain (7 - 10)  ondansetron Injectable 4 milliGRAM(s) IV Push every 6 hours PRN Nausea and/or Vomiting  oxyCODONE    IR 5 milliGRAM(s) Oral every 6 hours PRN Moderate Pain (4 - 6)      ROS:   ENT: all negative except as noted in HPI   Pulm: denies SOB, cough, hemoptysis  Neuro: denies numbness/tingling, loss of sensation  Endo: denies heat/cold intolerance, excessive sweating      Vital Signs Last 24 Hrs  T(C): 37.2 (20 Aug 2020 06:01), Max: 37.6 (19 Aug 2020 21:09)  T(F): 98.9 (20 Aug 2020 06:01), Max: 99.7 (19 Aug 2020 21:09)  HR: 70 (20 Aug 2020 06:01) (70 - 86)  BP: 136/82 (20 Aug 2020 06:01) (126/75 - 148/81)  BP(mean): --  RR: 18 (20 Aug 2020 06:01) (18 - 18)  SpO2: 100% (20 Aug 2020 06:01) (95% - 100%)                          9.5    22.44 )-----------( 274      ( 19 Aug 2020 17:47 )             29.7    08-19    131<L>  |  96  |  6<L>  ----------------------------<  139<H>  3.4<L>   |  25  |  0.53    Ca    9.3      19 Aug 2020 17:47  Phos  2.6     08-19  Mg     1.7     08-19    TPro  7.9  /  Alb  4.4  /  TBili  0.1<L>  /  DBili  x   /  AST  16  /  ALT  11  /  AlkPhos  117  08-18   PT/INR - ( 18 Aug 2020 23:38 )   PT: 12.7 sec;   INR: 1.07 ratio         PTT - ( 20 Aug 2020 07:17 )  PTT:56.1 sec    PHYSICAL EXAM:  Gen: NAD  Skin: No rashes, bruises, or lesions  Head: Normocephalic, Atraumatic  Face: no edema, erythema, or fluctuance. Parotid glands soft without mass  Eyes: no scleral injection  Nose: Nares bilaterally patent, no discharge  Mouth: No Stridor / Drooling / Trismus.  Mucosa moist, tongue/uvula midline, oropharynx clear  Neck: moderate indurated L neck swelling from angle of the mandible along the SCM, ttp  Lymphatic: No lymphadenopathy  Resp: breathing easily, no stridor  Neuro: facial nerve intact, no facial droop

## 2020-08-20 NOTE — PROGRESS NOTE ADULT - PROBLEM SELECTOR PLAN 1
Seen on CT neck and U/S; complication of possible PE(?)  -No intervention per IR and vascular team  -Cont heparin gtt; if no intervention planned, will likely switch to lovenox  -spoke to vascular team this AM who will reevaluate need for thrombectomy or port removal

## 2020-08-20 NOTE — PROGRESS NOTE ADULT - SUBJECTIVE AND OBJECTIVE BOX
Follow Up:  IJ thrombosis, abnormal CT chest     Interval History/ROS: Afebrile, no chills. Had worsening pain overnight, now better since starting steroids. No sore throat. No cough. No diarrhea.     Allergies  No Known Allergies        ANTIMICROBIALS:  piperacillin/tazobactam IVPB.. 3.375 every 8 hours      OTHER MEDS:  acetaminophen   Tablet .. 650 milliGRAM(s) Oral every 6 hours PRN  amLODIPine   Tablet 5 milliGRAM(s) Oral daily  dexAMETHasone  Injectable 6 milliGRAM(s) IV Push every 8 hours  heparin   Injectable 6000 Unit(s) IV Push every 6 hours PRN  heparin   Injectable 3000 Unit(s) IV Push every 6 hours PRN  heparin  Infusion.  Unit(s)/Hr IV Continuous <Continuous>  ketorolac   Injectable 15 milliGRAM(s) IV Push every 8 hours  morphine  - Injectable 4 milliGRAM(s) IV Push every 6 hours PRN  ondansetron Injectable 4 milliGRAM(s) IV Push every 6 hours PRN  oxyCODONE    IR 5 milliGRAM(s) Oral every 6 hours PRN      Vital Signs Last 24 Hrs  T(C): 37.2 (20 Aug 2020 17:32), Max: 37.6 (19 Aug 2020 21:09)  T(F): 98.9 (20 Aug 2020 17:32), Max: 99.7 (19 Aug 2020 21:09)  HR: 87 (20 Aug 2020 17:32) (67 - 87)  BP: 122/81 (20 Aug 2020 17:32) (112/78 - 148/81)  BP(mean): --  RR: 18 (20 Aug 2020 17:32) (18 - 18)  SpO2: 99% (20 Aug 2020 17:32) (97% - 100%)    Physical Exam:  General: awake, alert, non toxic  Head: atraumatic, normocephalic  Eye: normal sclera and conjunctiva  ENT: no gross oropharyngeal lesions. left neck fullness and tenderness   Cardio: regular rate and rhythm   Respiratory: nonlabored on room air, clear bilaterally, no wheezing  abd: soft, bowel sounds present, no tenderness  vascular: left upper chest port without inflammation   Neurologic: no focal deficit  psych: normal affect                          9.7    19.22 )-----------( 279      ( 20 Aug 2020 07:17 )             29.6       08-20    137  |  100  |  6<L>  ----------------------------<  109<H>  3.4<L>   |  27  |  0.46<L>    Ca    9.5      20 Aug 2020 07:17  Phos  2.6     08-19  Mg     1.7     08-19            MICROBIOLOGY:  Culture - Blood (collected 08-19-20 @ 05:16)  Source: .Blood Blood-Venous  Preliminary Report (08-20-20 @ 06:01):    No growth to date.    Culture - Blood (collected 08-19-20 @ 05:16)  Source: .Blood Blood-Peripheral  Preliminary Report (08-20-20 @ 06:01):    No growth to date.      RADIOLOGY:  Images below reviewed personally  VA Duplex Upper Ext Vein Scan, Left (08.19.20 @ 11:33)   There is acute left internal jugular and brachiocephalic vein DVT.    CT Neck Soft Tissue and Chest w/ IV Cont (08.18.20 @ 21:55)   CT NECK:  1.  Long segment of occlusive deep venous thrombosis in the left internal jugular vein.  Inferiorly the thrombus slightly extends along the patient's medication port to the junction of the left internal jugular vein and left subclavian vein. The left brachiocephalic vein and SVC are grossly patent.  2.  Moderate retropharyngeal edema extending from the C1 level to the C7 level.  There is also edema tracking along the left carotid space and left sternocleidomastoid muscle to the lower neck/chest wall.  No discrete abscess.  3.  Nonspecific subcentimeter left-sided cervical lymph nodes.  CT CHEST: Approximately 3 x 2 cm right lower lobe opacity is suspicious for pneumonia however pulmonary metastasis is not excluded.  A follow-up chest CT is recommended in one month after antibiotic therapy to ensure resolution.

## 2020-08-20 NOTE — PROGRESS NOTE ADULT - ASSESSMENT
46 yo female newly diagnosed w breat CA w L IJ thrombus and surrounding soft tissue edema likely related to her port and hypercoagulable state from breast cancer.

## 2020-08-20 NOTE — PROGRESS NOTE ADULT - ASSESSMENT
45F with breast cancer on doxorubicin/cyclophosphamide, admitted 8/19/20 with several day of left neck pain and swelling, found to have extensive LIJ thrombosis associated with her port.   Significant pain and leukocytosis but I do not suspect suppurative thrombophlebitis. An infection here would occur from either an oral/pharyngeal or skin/port infection but there's no evidence of either. Blood cultures negative to date. Improved quickly after starting steroids today.   Low clinical or radiographic concern for pneumonia. On review with radiology more likely a PE with infarct.     Suggest  -consider port removal   -I don't think antibiotics are helping and would stop them   -f/u blood cultures     Spoke with primary team     Yosef Mccallum MD   Infectious Disease   Pager [phone]   After 5PM and on weekends please page fellow on call or call [phone]

## 2020-08-21 LAB
ANION GAP SERPL CALC-SCNC: 11 MMOL/L — SIGNIFICANT CHANGE UP (ref 5–17)
APTT BLD: 74.1 SEC — HIGH (ref 27.5–35.5)
BUN SERPL-MCNC: 16 MG/DL — SIGNIFICANT CHANGE UP (ref 7–23)
CALCIUM SERPL-MCNC: 10.1 MG/DL — SIGNIFICANT CHANGE UP (ref 8.4–10.5)
CHLORIDE SERPL-SCNC: 101 MMOL/L — SIGNIFICANT CHANGE UP (ref 96–108)
CO2 SERPL-SCNC: 24 MMOL/L — SIGNIFICANT CHANGE UP (ref 22–31)
CREAT SERPL-MCNC: 0.52 MG/DL — SIGNIFICANT CHANGE UP (ref 0.5–1.3)
GLUCOSE SERPL-MCNC: 151 MG/DL — HIGH (ref 70–99)
HCT VFR BLD CALC: 31 % — LOW (ref 34.5–45)
HGB BLD-MCNC: 10.2 G/DL — LOW (ref 11.5–15.5)
MAGNESIUM SERPL-MCNC: 2.1 MG/DL — SIGNIFICANT CHANGE UP (ref 1.6–2.6)
MCHC RBC-ENTMCNC: 29.1 PG — SIGNIFICANT CHANGE UP (ref 27–34)
MCHC RBC-ENTMCNC: 32.9 GM/DL — SIGNIFICANT CHANGE UP (ref 32–36)
MCV RBC AUTO: 88.3 FL — SIGNIFICANT CHANGE UP (ref 80–100)
NRBC # BLD: 0 /100 WBCS — SIGNIFICANT CHANGE UP (ref 0–0)
PLATELET # BLD AUTO: 332 K/UL — SIGNIFICANT CHANGE UP (ref 150–400)
POTASSIUM SERPL-MCNC: 4.3 MMOL/L — SIGNIFICANT CHANGE UP (ref 3.5–5.3)
POTASSIUM SERPL-SCNC: 4.3 MMOL/L — SIGNIFICANT CHANGE UP (ref 3.5–5.3)
RBC # BLD: 3.51 M/UL — LOW (ref 3.8–5.2)
RBC # FLD: 15.4 % — HIGH (ref 10.3–14.5)
SODIUM SERPL-SCNC: 136 MMOL/L — SIGNIFICANT CHANGE UP (ref 135–145)
WBC # BLD: 29.93 K/UL — HIGH (ref 3.8–10.5)
WBC # FLD AUTO: 29.93 K/UL — HIGH (ref 3.8–10.5)

## 2020-08-21 PROCEDURE — 99233 SBSQ HOSP IP/OBS HIGH 50: CPT

## 2020-08-21 PROCEDURE — 99232 SBSQ HOSP IP/OBS MODERATE 35: CPT

## 2020-08-21 RX ORDER — PANTOPRAZOLE SODIUM 20 MG/1
40 TABLET, DELAYED RELEASE ORAL ONCE
Refills: 0 | Status: COMPLETED | OUTPATIENT
Start: 2020-08-21 | End: 2020-08-21

## 2020-08-21 RX ORDER — IBUPROFEN 200 MG
600 TABLET ORAL EVERY 8 HOURS
Refills: 0 | Status: COMPLETED | OUTPATIENT
Start: 2020-08-21 | End: 2020-08-23

## 2020-08-21 RX ORDER — PANTOPRAZOLE SODIUM 20 MG/1
40 TABLET, DELAYED RELEASE ORAL
Refills: 0 | Status: DISCONTINUED | OUTPATIENT
Start: 2020-08-21 | End: 2020-08-24

## 2020-08-21 RX ORDER — ENOXAPARIN SODIUM 100 MG/ML
80 INJECTION SUBCUTANEOUS EVERY 12 HOURS
Refills: 0 | Status: DISCONTINUED | OUTPATIENT
Start: 2020-08-21 | End: 2020-08-24

## 2020-08-21 RX ORDER — ENOXAPARIN SODIUM 100 MG/ML
80 INJECTION SUBCUTANEOUS
Qty: 60 | Refills: 0
Start: 2020-08-21 | End: 2020-09-19

## 2020-08-21 RX ORDER — DEXAMETHASONE 0.5 MG/5ML
4 ELIXIR ORAL EVERY 8 HOURS
Refills: 0 | Status: COMPLETED | OUTPATIENT
Start: 2020-08-21 | End: 2020-08-23

## 2020-08-21 RX ADMIN — Medication 15 MILLIGRAM(S): at 08:43

## 2020-08-21 RX ADMIN — PIPERACILLIN AND TAZOBACTAM 25 GRAM(S): 4; .5 INJECTION, POWDER, LYOPHILIZED, FOR SOLUTION INTRAVENOUS at 22:32

## 2020-08-21 RX ADMIN — PIPERACILLIN AND TAZOBACTAM 25 GRAM(S): 4; .5 INJECTION, POWDER, LYOPHILIZED, FOR SOLUTION INTRAVENOUS at 14:08

## 2020-08-21 RX ADMIN — Medication 6 MILLIGRAM(S): at 08:11

## 2020-08-21 RX ADMIN — HEPARIN SODIUM 1500 UNIT(S)/HR: 5000 INJECTION INTRAVENOUS; SUBCUTANEOUS at 08:14

## 2020-08-21 RX ADMIN — AMLODIPINE BESYLATE 5 MILLIGRAM(S): 2.5 TABLET ORAL at 06:43

## 2020-08-21 RX ADMIN — ONDANSETRON 4 MILLIGRAM(S): 8 TABLET, FILM COATED ORAL at 06:56

## 2020-08-21 RX ADMIN — Medication 6 MILLIGRAM(S): at 16:06

## 2020-08-21 RX ADMIN — PIPERACILLIN AND TAZOBACTAM 25 GRAM(S): 4; .5 INJECTION, POWDER, LYOPHILIZED, FOR SOLUTION INTRAVENOUS at 06:43

## 2020-08-21 RX ADMIN — ENOXAPARIN SODIUM 80 MILLIGRAM(S): 100 INJECTION SUBCUTANEOUS at 17:10

## 2020-08-21 RX ADMIN — Medication 4 MILLIGRAM(S): at 22:32

## 2020-08-21 RX ADMIN — Medication 6 MILLIGRAM(S): at 00:12

## 2020-08-21 RX ADMIN — Medication 600 MILLIGRAM(S): at 14:08

## 2020-08-21 RX ADMIN — Medication 15 MILLIGRAM(S): at 08:13

## 2020-08-21 RX ADMIN — PANTOPRAZOLE SODIUM 40 MILLIGRAM(S): 20 TABLET, DELAYED RELEASE ORAL at 14:08

## 2020-08-21 RX ADMIN — Medication 15 MILLIGRAM(S): at 00:12

## 2020-08-21 RX ADMIN — Medication 600 MILLIGRAM(S): at 22:32

## 2020-08-21 NOTE — PROGRESS NOTE ADULT - PROBLEM SELECTOR PLAN 1
-Zosyn per ID  -Warm compress  -Pain control  -Avoid carotid massage.   _Cont. decadron -Zosyn per ID  -Warm compress  -Pain control  -Avoid carotid massage.   start decadron taper to 4 mg q8 hrs for 2 day- then 2 mg q8 for 2 days then stop

## 2020-08-21 NOTE — PROGRESS NOTE ADULT - ASSESSMENT
44 yo female newly diagnosed w breat CA w L IJ thrombus and surrounding soft tissue edema likely related to her port and hypercoagulable state from breast cancer. WBC 19.22

## 2020-08-21 NOTE — PROGRESS NOTE ADULT - ASSESSMENT
45F with breast cancer on doxorubicin/cyclophosphamide, admitted 8/19/20 with several day of left neck pain and swelling, found to have extensive LIJ thrombosis associated with her port.   I do not suspect suppurative thrombophlebitis. Nontoxic, afebrile and improved with steroids. I think the clot itself explains her pain and leukocytosis. An infection here would occur from either an oral/pharyngeal or skin/port infection but there's no evidence of either and blood cultures negative to date. Treatment of jugular vein suppurative thrombophlebitis generally requires surgery and weeks of antibiotics.   Low clinical or radiographic concern for pneumonia. On review with radiology more likely a PE with infarct.     Suggest  -consider port removal   -would stop antibiotics   -f/u final blood cultures     Will sign off   Spoke with primary team     Yosef Mccallum MD   Infectious Disease   Pager [phone]   After 5PM and on weekends please page fellow on call or call [phone]

## 2020-08-21 NOTE — PROGRESS NOTE ADULT - SUBJECTIVE AND OBJECTIVE BOX
Follow Up: Thrombosis, abnormal CT chest    Interval History/ROS: Continues to feel better today. Less pain and swelling. No fevers or chills. No cough or dyspnea. Hasn't had a bowel movement yet. No dysuria.     Allergies  No Known Allergies        ANTIMICROBIALS:  piperacillin/tazobactam IVPB.. 3.375 every 8 hours      OTHER MEDS:  acetaminophen   Tablet .. 650 milliGRAM(s) Oral every 6 hours PRN  amLODIPine   Tablet 5 milliGRAM(s) Oral daily  dexAMETHasone  Injectable 6 milliGRAM(s) IV Push every 8 hours  heparin   Injectable 6000 Unit(s) IV Push every 6 hours PRN  heparin   Injectable 3000 Unit(s) IV Push every 6 hours PRN  heparin  Infusion.  Unit(s)/Hr IV Continuous <Continuous>  morphine  - Injectable 4 milliGRAM(s) IV Push every 6 hours PRN  ondansetron Injectable 4 milliGRAM(s) IV Push every 6 hours PRN  oxyCODONE    IR 5 milliGRAM(s) Oral every 6 hours PRN      Vital Signs Last 24 Hrs  T(C): 36.8 (21 Aug 2020 09:19), Max: 37.3 (21 Aug 2020 01:09)  T(F): 98.2 (21 Aug 2020 09:19), Max: 99.1 (21 Aug 2020 01:09)  HR: 80 (21 Aug 2020 09:19) (61 - 87)  BP: 118/75 (21 Aug 2020 09:19) (112/78 - 130/81)  BP(mean): --  RR: 18 (21 Aug 2020 09:19) (18 - 18)  SpO2: 100% (21 Aug 2020 09:19) (95% - 100%)    Physical Exam:  General: awake, alert, non toxic  Head: atraumatic, normocephalic  Eye: normal sclera and conjunctiva  ENT: left neck stable fullness, improved tenderness   Cardio: regular rate and rhythm   Respiratory: nonlabored on room air, clear bilaterally, no wheezing  abd: soft, bowel sounds present, no tenderness  vascular: left upper chest port no local signs of infect   Neurologic: no focal deficit  psych: normal affect                          10.2   29.93 )-----------( 332      ( 21 Aug 2020 07:10 )             31.0       08-21    136  |  101  |  16  ----------------------------<  151<H>  4.3   |  24  |  0.52    Ca    10.1      21 Aug 2020 07:07  Mg     2.1     08-21      MICROBIOLOGY:  Culture - Blood (collected 08-19-20 @ 05:16)  Source: .Blood Blood-Venous  Preliminary Report (08-20-20 @ 06:01):    No growth to date.    Culture - Blood (collected 08-19-20 @ 05:16)  Source: .Blood Blood-Peripheral  Preliminary Report (08-20-20 @ 06:01):    No growth to date.    RADIOLOGY:  Images below reviewed personally  VA Duplex Upper Ext Vein Scan, Left (08.19.20 @ 11:33)   There is acute left internal jugular and brachiocephalic vein DVT.    CT Neck Soft Tissue and Chest w/ IV Cont (08.18.20 @ 21:55)   CT NECK:  1.  Long segment of occlusive deep venous thrombosis in the left internal jugular vein.  Inferiorly the thrombus slightly extends along the patient's medication port to the junction of the left internal jugular vein and left subclavian vein. The left brachiocephalic vein and SVC are grossly patent.  2.  Moderate retropharyngeal edema extending from the C1 level to the C7 level.  There is also edema tracking along the left carotid space and left sternocleidomastoid muscle to the lower neck/chest wall.  No discrete abscess.  3.  Nonspecific subcentimeter left-sided cervical lymph nodes.  CT CHEST: Approximately 3 x 2 cm right lower lobe opacity is suspicious for pneumonia however pulmonary metastasis is not excluded.  A follow-up chest CT is recommended in one month after antibiotic therapy to ensure resolution.

## 2020-08-21 NOTE — CHART NOTE - NSCHARTNOTEFT_GEN_A_CORE
Recommend continuing anticoagulation for the L IJ thrombus. No particular PO AC recs from vascular standpoint.    Will sign off at this point, please page us back with questions    MATTI Herndon PGY-3  Vascular  [id number]

## 2020-08-21 NOTE — PROGRESS NOTE ADULT - SUBJECTIVE AND OBJECTIVE BOX
[facility] Division of Hospital Medicine  Brenda Curran MD  Pager (M-F, 8A-5P): [phone]  Other Times:  [phone]    Patient is a 45y old  Female who presents with a chief complaint of L neck pain and fullness (19 Aug 2020 12:27)      SUBJECTIVE / OVERNIGHT EVENTS:  Pt seen and examined at bedside. No acute events. Reports marked improvement in neck swelling pain and swallowing with steroid administration.    MEDICATIONS  (STANDING):  amLODIPine   Tablet 5 milliGRAM(s) Oral daily  dexAMETHasone  Injectable 6 milliGRAM(s) IV Push every 8 hours  heparin  Infusion.  Unit(s)/Hr (13 mL/Hr) IV Continuous <Continuous>  piperacillin/tazobactam IVPB.. 3.375 Gram(s) IV Intermittent every 8 hours    MEDICATIONS  (PRN):  acetaminophen   Tablet .. 650 milliGRAM(s) Oral every 6 hours PRN Temp greater or equal to 38C (100.4F), Mild Pain (1 - 3)  heparin   Injectable 6000 Unit(s) IV Push every 6 hours PRN For aPTT less than 40  heparin   Injectable 3000 Unit(s) IV Push every 6 hours PRN For aPTT between 40 - 57  morphine  - Injectable 4 milliGRAM(s) IV Push every 6 hours PRN Severe Pain (7 - 10)  ondansetron Injectable 4 milliGRAM(s) IV Push every 6 hours PRN Nausea and/or Vomiting  oxyCODONE    IR 5 milliGRAM(s) Oral every 6 hours PRN Moderate Pain (4 - 6)      PHYSICAL EXAM:  T(C): 36.8 (08-21-20 @ 09:19), Max: 37.3 (08-21-20 @ 01:09)  T(F): 98.2 (08-21-20 @ 09:19), Max: 99.1 (08-21-20 @ 01:09)  HR: 80 (08-21-20 @ 09:19) (61 - 87)  BP: 118/75 (08-21-20 @ 09:19) (112/78 - 130/81)  RR: 18 (08-21-20 @ 09:19) (18 - 18)  SpO2: 100% (08-21-20 @ 09:19) (95% - 100%)  Wt(kg): --    CONSTITUTIONAL: NAD, well-developed  EYES: PERRLA; conjunctiva and sclera clear  ENMT: Moist oral mucosa, no pharyngeal injection or exudates; no tonsilar swelling  NECK: Supple, L sided swelling and tenderness, improved; +lymphadenopathy; L upper chest port  RESPIRATORY: Normal respiratory effort; lungs are clear to auscultation bilaterally, no wheezing  CARDIOVASCULAR: Regular rate and rhythm, normal S1 and S2, no murmur/rub/gallop; No lower extremity edema; Peripheral pulses are 2+ bilaterally  ABDOMEN: Nontender to palpation, normoactive bowel sounds, no rebound/guarding  MUSCULOSKELETAL:  Normal gait; no clubbing or cyanosis of digits; no joint swelling or tenderness to palpation  PSYCH: A+O to person, place, and time; affect appropriate  NEUROLOGY: CN 2-12 are intact and symmetric; no gross sensory deficits   SKIN: No rashes; no palpable lesions    LABS:  LABS/RADIOLOGY RESULTS:                          10.2   29.93 )-----------( 332      ( 21 Aug 2020 07:10 )             31.0   08-21    136  |  101  |  16  ----------------------------<  151<H>  4.3   |  24  |  0.52    Ca    10.1      21 Aug 2020 07:07  Mg     2.1     08-21    PTT - ( 21 Aug 2020 07:08 )  PTT:74.1 secBlood Cultures    Blood Culture--   08-19 @ 05:16    Results  No growth to date.

## 2020-08-21 NOTE — PROGRESS NOTE ADULT - PROBLEM SELECTOR PLAN 2
Retropharyngeal edema seen on CT; concern for infection  -Leukocytosis in setting of neulasta, last dose 2 weeks ago, and now steroids  -continue zosyn for now  -ID and ENT recs appreciated  -F/u further ENT recs, re: duration of steroid therapy  -monitor closely for airway compromise; low threshold for ICU consult Retropharyngeal edema seen on CT; concern for infection  -Leukocytosis in setting of neulasta, last dose 2 weeks ago, and now steroids  -continue zosyn for now  -ID and ENT recs appreciated  -F/u further ENT recs, re: duration of steroid therapy  -monitor closely for airway compromise; low threshold for ICU consult  -Pain management with ibuprofen X 48 hrs; PPI for now given steroids and NSAIDs; morphine PRN severe pain

## 2020-08-21 NOTE — PROGRESS NOTE ADULT - SUBJECTIVE AND OBJECTIVE BOX
ENT ISSUE/POD: L neck swelling    HPI: 46 yo female w newly diagnosed breast CA found to have L IJ thrombus. Pt with L neck swelling, notes increase in pain and swelling overnight making swallowing slightly difficult, mild relief with heat packs. Otherwise denies changes/difficulty in breathing, change in voice, sore throat, difficulty tolerating secretions, headache, vision changes.          PAST MEDICAL & SURGICAL HISTORY:  Breast cancer  No pertinent past medical history  H/O tubal ligation  No significant past surgical history    Allergies    No Known Allergies    Intolerances      MEDICATIONS  (STANDING):  amLODIPine   Tablet 5 milliGRAM(s) Oral daily  dexAMETHasone  Injectable 6 milliGRAM(s) IV Push every 8 hours  heparin  Infusion.  Unit(s)/Hr (13 mL/Hr) IV Continuous <Continuous>  ketorolac   Injectable 15 milliGRAM(s) IV Push every 8 hours  piperacillin/tazobactam IVPB.. 3.375 Gram(s) IV Intermittent every 8 hours    MEDICATIONS  (PRN):  acetaminophen   Tablet .. 650 milliGRAM(s) Oral every 6 hours PRN Temp greater or equal to 38C (100.4F), Mild Pain (1 - 3)  heparin   Injectable 6000 Unit(s) IV Push every 6 hours PRN For aPTT less than 40  heparin   Injectable 3000 Unit(s) IV Push every 6 hours PRN For aPTT between 40 - 57  morphine  - Injectable 4 milliGRAM(s) IV Push every 6 hours PRN Severe Pain (7 - 10)  ondansetron Injectable 4 milliGRAM(s) IV Push every 6 hours PRN Nausea and/or Vomiting  oxyCODONE    IR 5 milliGRAM(s) Oral every 6 hours PRN Moderate Pain (4 - 6)      Social History: see consult    Family history: see consult    ROS:   ENT: all negative except as noted in HPI   Pulm: denies SOB, cough, hemoptysis  Neuro: denies numbness/tingling, loss of sensation  Endo: denies heat/cold intolerance, excessive sweating      Vital Signs Last 24 Hrs  T(C): 36.7 (21 Aug 2020 06:13), Max: 37.3 (21 Aug 2020 01:09)  T(F): 98.1 (21 Aug 2020 06:13), Max: 99.1 (21 Aug 2020 01:09)  HR: 66 (21 Aug 2020 06:13) (61 - 87)  BP: 126/75 (21 Aug 2020 06:13) (112/78 - 130/81)  BP(mean): --  RR: 18 (21 Aug 2020 06:13) (18 - 18)  SpO2: 99% (21 Aug 2020 06:13) (95% - 100%)                          9.7    19.22 )-----------( 279      ( 20 Aug 2020 07:17 )             29.6    08-20    137  |  100  |  6<L>  ----------------------------<  109<H>  3.4<L>   |  27  |  0.46<L>    Ca    9.5      20 Aug 2020 07:17  Mg     1.7     08-19     PTT - ( 20 Aug 2020 21:56 )  PTT:70.2 sec    PHYSICAL EXAM:  Gen: NAD  Skin: No rashes, bruises, or lesions  Head: Normocephalic, Atraumatic  Face: no edema, erythema, or fluctuance. Parotid glands soft without mass  Eyes: no scleral injection  Nose: Nares bilaterally patent, no discharge  Mouth: No Stridor / Drooling / Trismus.  Mucosa moist, tongue/uvula midline, oropharynx clear  Neck: moderately indurated L neck swelling from angle of the mandible along the SCM, ttp  Lymphatic: No lymphadenopathy  Resp: breathing easily, no stridor  Neuro: facial nerve intact, no facial droop

## 2020-08-21 NOTE — PROGRESS NOTE ADULT - PROBLEM SELECTOR PLAN 1
Seen on CT neck and U/S  -No intervention per IR and vascular team  - Reached out to vascular regarding plan for port, removal? If we leave it in, can it be used for chemo? Pt will need a plan for chemo access prior to discharge  -switch to lovenox tonight pending vascular response, will need teaching prior to discharge

## 2020-08-22 LAB
ANION GAP SERPL CALC-SCNC: 13 MMOL/L — SIGNIFICANT CHANGE UP (ref 5–17)
APTT BLD: 27.6 SEC — SIGNIFICANT CHANGE UP (ref 27.5–35.5)
BUN SERPL-MCNC: 20 MG/DL — SIGNIFICANT CHANGE UP (ref 7–23)
CALCIUM SERPL-MCNC: 9.9 MG/DL — SIGNIFICANT CHANGE UP (ref 8.4–10.5)
CHLORIDE SERPL-SCNC: 103 MMOL/L — SIGNIFICANT CHANGE UP (ref 96–108)
CO2 SERPL-SCNC: 23 MMOL/L — SIGNIFICANT CHANGE UP (ref 22–31)
CREAT SERPL-MCNC: 0.58 MG/DL — SIGNIFICANT CHANGE UP (ref 0.5–1.3)
GLUCOSE SERPL-MCNC: 122 MG/DL — HIGH (ref 70–99)
HCT VFR BLD CALC: 30.5 % — LOW (ref 34.5–45)
HGB BLD-MCNC: 9.9 G/DL — LOW (ref 11.5–15.5)
MAGNESIUM SERPL-MCNC: 2.1 MG/DL — SIGNIFICANT CHANGE UP (ref 1.6–2.6)
MCHC RBC-ENTMCNC: 28.9 PG — SIGNIFICANT CHANGE UP (ref 27–34)
MCHC RBC-ENTMCNC: 32.5 GM/DL — SIGNIFICANT CHANGE UP (ref 32–36)
MCV RBC AUTO: 89.2 FL — SIGNIFICANT CHANGE UP (ref 80–100)
NRBC # BLD: 0 /100 WBCS — SIGNIFICANT CHANGE UP (ref 0–0)
OB PNL STL: NEGATIVE — SIGNIFICANT CHANGE UP
PLATELET # BLD AUTO: 375 K/UL — SIGNIFICANT CHANGE UP (ref 150–400)
POTASSIUM SERPL-MCNC: 4.2 MMOL/L — SIGNIFICANT CHANGE UP (ref 3.5–5.3)
POTASSIUM SERPL-SCNC: 4.2 MMOL/L — SIGNIFICANT CHANGE UP (ref 3.5–5.3)
RBC # BLD: 3.42 M/UL — LOW (ref 3.8–5.2)
RBC # FLD: 15.8 % — HIGH (ref 10.3–14.5)
SODIUM SERPL-SCNC: 139 MMOL/L — SIGNIFICANT CHANGE UP (ref 135–145)
WBC # BLD: 31.33 K/UL — HIGH (ref 3.8–10.5)
WBC # FLD AUTO: 31.33 K/UL — HIGH (ref 3.8–10.5)

## 2020-08-22 PROCEDURE — 99232 SBSQ HOSP IP/OBS MODERATE 35: CPT

## 2020-08-22 RX ADMIN — PIPERACILLIN AND TAZOBACTAM 25 GRAM(S): 4; .5 INJECTION, POWDER, LYOPHILIZED, FOR SOLUTION INTRAVENOUS at 05:34

## 2020-08-22 RX ADMIN — Medication 4 MILLIGRAM(S): at 21:04

## 2020-08-22 RX ADMIN — Medication 650 MILLIGRAM(S): at 21:40

## 2020-08-22 RX ADMIN — ENOXAPARIN SODIUM 80 MILLIGRAM(S): 100 INJECTION SUBCUTANEOUS at 17:31

## 2020-08-22 RX ADMIN — PANTOPRAZOLE SODIUM 40 MILLIGRAM(S): 20 TABLET, DELAYED RELEASE ORAL at 05:33

## 2020-08-22 RX ADMIN — Medication 650 MILLIGRAM(S): at 13:43

## 2020-08-22 RX ADMIN — Medication 650 MILLIGRAM(S): at 21:04

## 2020-08-22 RX ADMIN — Medication 4 MILLIGRAM(S): at 05:34

## 2020-08-22 RX ADMIN — Medication 600 MILLIGRAM(S): at 21:04

## 2020-08-22 RX ADMIN — Medication 600 MILLIGRAM(S): at 13:43

## 2020-08-22 RX ADMIN — Medication 600 MILLIGRAM(S): at 05:34

## 2020-08-22 RX ADMIN — Medication 600 MILLIGRAM(S): at 13:13

## 2020-08-22 RX ADMIN — Medication 4 MILLIGRAM(S): at 13:12

## 2020-08-22 RX ADMIN — Medication 650 MILLIGRAM(S): at 13:13

## 2020-08-22 RX ADMIN — AMLODIPINE BESYLATE 5 MILLIGRAM(S): 2.5 TABLET ORAL at 05:33

## 2020-08-22 RX ADMIN — ENOXAPARIN SODIUM 80 MILLIGRAM(S): 100 INJECTION SUBCUTANEOUS at 05:34

## 2020-08-22 NOTE — PROGRESS NOTE ADULT - PROBLEM SELECTOR PLAN 2
Retropharyngeal edema seen on CT; concern for infection  -Leukocytosis in setting of neulasta, last dose 2 weeks ago, and now steroids  -DC zosyn  -ID and ENT recs appreciated  -cont decadron taper  -monitor closely for airway compromise; low threshold for ICU consult  -Pain management with ibuprofen X 48 hrs; PPI for now given steroids and NSAIDs; morphine PRN severe pain

## 2020-08-22 NOTE — PROGRESS NOTE ADULT - PROBLEM SELECTOR PLAN 1
Seen on CT neck and U/S  - No intervention per IR and vascular team  - Reached out to vascular regarding plan for port, removal? If we leave it in, can it be used for chemo? Pt will need a plan for chemo access prior to discharge  -switch to lovenox

## 2020-08-22 NOTE — PROGRESS NOTE ADULT - SUBJECTIVE AND OBJECTIVE BOX
[facility] Division of Hospital Medicine  Miesha DO Quintin  Pager (MATTI-RODERICK, 1X-2K): [phone]  Other Times:  [phone]    Patient is a 45y old  Female who presents with a chief complaint of L neck pain and fullness (21 Aug 2020 12:40)      SUBJECTIVE / OVERNIGHT EVENTS: None. Patient is worried about the mediport thrombus. Having some pain in her neck still, which is mostly controlled with high dose ibuprofen, but it does not take the pain away fully. She does not like to take morphine. No chest pain, sob.   ADDITIONAL REVIEW OF SYSTEMS: negative    MEDICATIONS  (STANDING):  amLODIPine   Tablet 5 milliGRAM(s) Oral daily  dexAMETHasone  Injectable 4 milliGRAM(s) IV Push every 8 hours  enoxaparin Injectable 80 milliGRAM(s) SubCutaneous every 12 hours  ibuprofen  Tablet. 600 milliGRAM(s) Oral every 8 hours  pantoprazole    Tablet 40 milliGRAM(s) Oral before breakfast    MEDICATIONS  (PRN):  acetaminophen   Tablet .. 650 milliGRAM(s) Oral every 6 hours PRN Temp greater or equal to 38C (100.4F), Mild Pain (1 - 3)  morphine  - Injectable 4 milliGRAM(s) IV Push every 6 hours PRN Severe Pain (7 - 10)  ondansetron Injectable 4 milliGRAM(s) IV Push every 6 hours PRN Nausea and/or Vomiting  oxyCODONE    IR 5 milliGRAM(s) Oral every 6 hours PRN Moderate Pain (4 - 6)      CAPILLARY BLOOD GLUCOSE        I&O's Summary    21 Aug 2020 07:01  -  22 Aug 2020 07:00  --------------------------------------------------------  IN: 1610 mL / OUT: 900 mL / NET: 710 mL    22 Aug 2020 07:01  -  22 Aug 2020 16:11  --------------------------------------------------------  IN: 580 mL / OUT: 0 mL / NET: 580 mL        PHYSICAL EXAM:  Vital Signs Last 24 Hrs  T(C): 37 (22 Aug 2020 13:16), Max: 37.5 (21 Aug 2020 17:20)  T(F): 98.6 (22 Aug 2020 13:16), Max: 99.5 (21 Aug 2020 17:20)  HR: 75 (22 Aug 2020 13:16) (62 - 83)  BP: 129/84 (22 Aug 2020 13:16) (110/66 - 129/84)  BP(mean): --  RR: 18 (22 Aug 2020 13:16) (16 - 18)  SpO2: 99% (22 Aug 2020 13:16) (99% - 100%)  CONSTITUTIONAL: NAD, well-developed  EYES: PERRLA; conjunctiva and sclera clear  ENMT: Moist oral mucosa, no pharyngeal injection or exudates; no tonsilar swelling  NECK: Supple, L sided swelling and tenderness, improved; +lymphadenopathy; L upper chest port  RESPIRATORY: Normal respiratory effort; lungs are clear to auscultation bilaterally, no wheezing  Chest: left side mediport in place  CARDIOVASCULAR: Regular rate and rhythm, normal S1 and S2, no murmur/rub/gallop; No lower extremity edema; Peripheral pulses are 2+ bilaterally  ABDOMEN: Nontender to palpation, normoactive bowel sounds, no rebound/guarding  MUSCULOSKELETAL:  Normal gait; no clubbing or cyanosis of digits; no joint swelling or tenderness to palpation  PSYCH: A+O to person, place, and time; affect appropriate  NEUROLOGY: CN 2-12 are intact and symmetric; no gross sensory deficits   SKIN: No rashes; no palpable lesions    LABS:                        9.9    31.33 )-----------( 375      ( 22 Aug 2020 06:20 )             30.5     08-22    139  |  103  |  20  ----------------------------<  122<H>  4.2   |  23  |  0.58    Ca    9.9      22 Aug 2020 06:20  Mg     2.1     08-22      PTT - ( 22 Aug 2020 06:20 )  PTT:27.6 sec      COORDINATION OF CARE:  Care Discussed with Consultants/Other Providers [Y/N]: yes vascular surgery

## 2020-08-23 LAB
ANION GAP SERPL CALC-SCNC: 12 MMOL/L — SIGNIFICANT CHANGE UP (ref 5–17)
BUN SERPL-MCNC: 21 MG/DL — SIGNIFICANT CHANGE UP (ref 7–23)
CALCIUM SERPL-MCNC: 10.1 MG/DL — SIGNIFICANT CHANGE UP (ref 8.4–10.5)
CHLORIDE SERPL-SCNC: 100 MMOL/L — SIGNIFICANT CHANGE UP (ref 96–108)
CO2 SERPL-SCNC: 25 MMOL/L — SIGNIFICANT CHANGE UP (ref 22–31)
CREAT SERPL-MCNC: 0.58 MG/DL — SIGNIFICANT CHANGE UP (ref 0.5–1.3)
GLUCOSE SERPL-MCNC: 111 MG/DL — HIGH (ref 70–99)
HCT VFR BLD CALC: 31.9 % — LOW (ref 34.5–45)
HGB BLD-MCNC: 10.3 G/DL — LOW (ref 11.5–15.5)
MAGNESIUM SERPL-MCNC: 1.9 MG/DL — SIGNIFICANT CHANGE UP (ref 1.6–2.6)
MCHC RBC-ENTMCNC: 28.5 PG — SIGNIFICANT CHANGE UP (ref 27–34)
MCHC RBC-ENTMCNC: 32.3 GM/DL — SIGNIFICANT CHANGE UP (ref 32–36)
MCV RBC AUTO: 88.4 FL — SIGNIFICANT CHANGE UP (ref 80–100)
NRBC # BLD: 0 /100 WBCS — SIGNIFICANT CHANGE UP (ref 0–0)
PHOSPHATE SERPL-MCNC: 3.6 MG/DL — SIGNIFICANT CHANGE UP (ref 2.5–4.5)
PLATELET # BLD AUTO: 433 K/UL — HIGH (ref 150–400)
POTASSIUM SERPL-MCNC: 4.1 MMOL/L — SIGNIFICANT CHANGE UP (ref 3.5–5.3)
POTASSIUM SERPL-SCNC: 4.1 MMOL/L — SIGNIFICANT CHANGE UP (ref 3.5–5.3)
RBC # BLD: 3.61 M/UL — LOW (ref 3.8–5.2)
RBC # FLD: 15.6 % — HIGH (ref 10.3–14.5)
SODIUM SERPL-SCNC: 137 MMOL/L — SIGNIFICANT CHANGE UP (ref 135–145)
WBC # BLD: 23.86 K/UL — HIGH (ref 3.8–10.5)
WBC # FLD AUTO: 23.86 K/UL — HIGH (ref 3.8–10.5)

## 2020-08-23 PROCEDURE — 99233 SBSQ HOSP IP/OBS HIGH 50: CPT

## 2020-08-23 RX ORDER — DEXAMETHASONE 0.5 MG/5ML
2 ELIXIR ORAL EVERY 8 HOURS
Refills: 0 | Status: DISCONTINUED | OUTPATIENT
Start: 2020-08-24 | End: 2020-08-24

## 2020-08-23 RX ADMIN — PANTOPRAZOLE SODIUM 40 MILLIGRAM(S): 20 TABLET, DELAYED RELEASE ORAL at 05:42

## 2020-08-23 RX ADMIN — ENOXAPARIN SODIUM 80 MILLIGRAM(S): 100 INJECTION SUBCUTANEOUS at 17:04

## 2020-08-23 RX ADMIN — AMLODIPINE BESYLATE 5 MILLIGRAM(S): 2.5 TABLET ORAL at 05:42

## 2020-08-23 RX ADMIN — Medication 650 MILLIGRAM(S): at 23:05

## 2020-08-23 RX ADMIN — Medication 4 MILLIGRAM(S): at 13:09

## 2020-08-23 RX ADMIN — Medication 4 MILLIGRAM(S): at 05:42

## 2020-08-23 RX ADMIN — ENOXAPARIN SODIUM 80 MILLIGRAM(S): 100 INJECTION SUBCUTANEOUS at 05:43

## 2020-08-23 RX ADMIN — Medication 650 MILLIGRAM(S): at 23:35

## 2020-08-23 RX ADMIN — Medication 600 MILLIGRAM(S): at 05:42

## 2020-08-23 NOTE — PROGRESS NOTE ADULT - PROBLEM SELECTOR PLAN 6
-replete aggressively
-replete aggressively  -repeat CHEM8 tonight

## 2020-08-23 NOTE — PROGRESS NOTE ADULT - ATTENDING COMMENTS
Miesha Ribeiro DO  Pager #: [phone] Went back to patient's room to update her regarding her port. Her port is OK to use, per vascular and IR notes, there are no plans for removing or placing a new port at this time. It is important to continue to administer lovenox upon discharge, and she has already practiced doing this while in the hospital. Since IR would be the team to ask if she had any further questions, and they are not available on the weekends, I told her that she would have to stay overnight if she wanted to see them once again. She told me she would talk with her  and let the nurse know what she decided.    45 minutes spent on total encounter; more than 50% of the visit was spent counseling and/or coordinating care for the patient.    Miesha Ribeiro DO  Pager #: [phone] Went back to patient's room to update her regarding her port. Her port is OK to use, per vascular and IR notes, there are no plans for removing or placing a new port at this time. It is important to continue to administer lovenox upon discharge, and she has already practiced doing this while in the hospital. Since IR would be the team to ask if she had any further questions, and they are not available on the weekends, I told her that she would have to stay overnight if she wanted to see them once again. She told me she would talk with her  and let the nurse know what she decided.    45 minutes spent on total encounter; more than 50% of the visit was spent counseling and/or coordinating care for the patient. Also updated her outpatient oncologist, Dr. Laney Woods, about plan of care. Dr. Woods is fine with the patient being discharged and will follow up with her closely in oncology clinic within the next week.    Miesha Ribeiro DO  Pager #: [phone]

## 2020-08-23 NOTE — PROGRESS NOTE ADULT - PROBLEM SELECTOR PROBLEM 3
Pneumonia of right lower lobe due to infectious organism

## 2020-08-23 NOTE — PROGRESS NOTE ADULT - PROBLEM SELECTOR PLAN 3
-cont zosyn as above  -cultures so far negative  -Per ID notes, RLL findings likely PE, which was reviewed with radiologist
-cont zosyn as above  -cultures so far negative  -Per ID notes, RLL findings likely PE, which was reviewed with radiologist
-Per ID, after reviewing images with radiologist, likely PE and not infection  -blood cx and urinary antigens so far negative
-cont zosyn as above  -cultures so far negative  -Per ID notes, RLL findings likely PE, which was reviewed with radiologist
-cont zosyn as above  -f/u blood and sputum cx  -f/u pneumococcal and legionella urinary antigens

## 2020-08-23 NOTE — PROGRESS NOTE ADULT - PROBLEM SELECTOR PLAN 4
-Discussed with pt's oncologist Dr Torres: was planned for chemo 8/20; no longer planned in setting of possible infection
Discussed with pt's oncologist Dr Torres: was planned for chemo today 9/20; no longer planned in setting of acute issues
-Discussed with pt's oncologist Dr Torres: was planned for chemo 8/20; no longer planned in setting of possible infection
Discussed with pt's oncologist Dr Torres: was planned for chemo tomorrow; no longer planned in setting of possible infection
Discussed with pt's oncologist Dr Torres: was planned for chemo 8/20; no longer planned in setting of possible infection

## 2020-08-23 NOTE — PROGRESS NOTE ADULT - PROBLEM SELECTOR PROBLEM 6
Electrolyte abnormality

## 2020-08-23 NOTE — PROGRESS NOTE ADULT - SUBJECTIVE AND OBJECTIVE BOX
[facility] Division of Hospital Medicine  Miesha DO Quintin  Pager (M-F, 9L-9R): [phone]  Other Times:  [phone]    Patient is a 45y old  Female who presents with a chief complaint of L neck pain and fullness (22 Aug 2020 16:11)      SUBJECTIVE / OVERNIGHT EVENTS: No overnight events. Pain in neck has improved overall.  ADDITIONAL REVIEW OF SYSTEMS: negative    MEDICATIONS  (STANDING):  amLODIPine   Tablet 5 milliGRAM(s) Oral daily  enoxaparin Injectable 80 milliGRAM(s) SubCutaneous every 12 hours  pantoprazole    Tablet 40 milliGRAM(s) Oral before breakfast    MEDICATIONS  (PRN):  acetaminophen   Tablet .. 650 milliGRAM(s) Oral every 6 hours PRN Temp greater or equal to 38C (100.4F), Mild Pain (1 - 3)  morphine  - Injectable 4 milliGRAM(s) IV Push every 6 hours PRN Severe Pain (7 - 10)  ondansetron Injectable 4 milliGRAM(s) IV Push every 6 hours PRN Nausea and/or Vomiting  oxyCODONE    IR 5 milliGRAM(s) Oral every 6 hours PRN Moderate Pain (4 - 6)    CAPILLARY BLOOD GLUCOSE        I&O's Summary    22 Aug 2020 07:01  -  23 Aug 2020 07:00  --------------------------------------------------------  IN: 1540 mL / OUT: 0 mL / NET: 1540 mL    23 Aug 2020 07:01  -  23 Aug 2020 13:42  --------------------------------------------------------  IN: 480 mL / OUT: 0 mL / NET: 480 mL        PHYSICAL EXAM:  Vital Signs Last 24 Hrs  T(C): 37 (23 Aug 2020 13:22), Max: 37.2 (22 Aug 2020 17:19)  T(F): 98.6 (23 Aug 2020 13:22), Max: 99 (22 Aug 2020 17:19)  HR: 63 (23 Aug 2020 13:22) (56 - 76)  BP: 109/66 (23 Aug 2020 13:22) (107/67 - 133/82)  BP(mean): --  RR: 18 (23 Aug 2020 13:22) (17 - 18)  SpO2: 99% (23 Aug 2020 13:22) (98% - 100%)  CONSTITUTIONAL: NAD, well-developed  EYES: PERRLA; conjunctiva and sclera clear  ENMT: Moist oral mucosa, no pharyngeal injection or exudates; no tonsilar swelling  NECK: Supple, L sided swelling and tenderness, resolved; +lymphadenopathy; L upper chest port  RESPIRATORY: Normal respiratory effort; lungs are clear to auscultation bilaterally, no wheezing  Chest: left side mediport in place  CARDIOVASCULAR: Regular rate and rhythm, normal S1 and S2, no murmur/rub/gallop; No lower extremity edema; Peripheral pulses are 2+ bilaterally  ABDOMEN: Nontender to palpation, normoactive bowel sounds, no rebound/guarding  MUSCULOSKELETAL:  Normal gait; no clubbing or cyanosis of digits; no joint swelling or tenderness to palpation  PSYCH: A+O to person, place, and time; affect appropriate  NEUROLOGY: CN 2-12 are intact and symmetric; no gross sensory deficits   SKIN: No rashes; no palpable lesions    LABS:                        10.3   23.86 )-----------( 433      ( 23 Aug 2020 06:36 )             31.9     08-23    137  |  100  |  21  ----------------------------<  111<H>  4.1   |  25  |  0.58    Ca    10.1      23 Aug 2020 06:36  Phos  3.6     08-23  Mg     1.9     08-23      PTT - ( 22 Aug 2020 06:20 )  PTT:27.6 sec        COORDINATION OF CARE:  Care Discussed with Consultants/Other Providers [Y/N]: yes - vascular surgery

## 2020-08-23 NOTE — PROGRESS NOTE ADULT - PROBLEM SELECTOR PLAN 1
Seen on CT neck and U/S  - No intervention per IR and vascular team  - per IR note on 8/19, no intervention planned regarding the port  - Reached out to vascular regarding plan for port, however it was IR that placed the port in the first place. Per their impression, and upon review of the imaging, the thrombus is in the IJ but the catheter goes all the way to the right atrium, there should be no contraindications to continuing to use the mediport  -switch to lovenox Seen on CT neck and U/S  - No intervention per IR and vascular team  - per IR note on 8/19, no intervention planned regarding the port  - Reached out to vascular regarding plan for port, however it was IR (Dr. Fernie Sanchez) that placed the port in the first place. Per their impression, and upon review of the imaging, the thrombus is in the IJ but the catheter goes all the way and drains into right atrium, there should be no contraindications to continuing to use the mediport  -switch to lovenox

## 2020-08-23 NOTE — PROGRESS NOTE ADULT - PROBLEM SELECTOR PLAN 7
-On heparin as above
-On heparin as above
- therapeutic lovenox for LIJ DVT
-On heparin as above
- therapeutic lovenox for LIJ DVT

## 2020-08-23 NOTE — PROGRESS NOTE ADULT - PROBLEM SELECTOR PLAN 5
Hgb 11->7.8->9, In setting of heparin gtt  -monitor hemodynamics  -f/u stool occult  -iron panel reviewed; negative for MARTIN  -Trend Hgb daily
Stable; anemia workup so far negative  -In setting of heparin gtt  -still awaiting stool sample for occult blood  -monitor hemodynamics; if continues to downtrend, will obtain CT A/P r/o RP bleed  -transfuse for goal hgb >7  -Trend Hgb Q12
Stable; anemia workup so far negative  -In setting of heparin gtt  -still awaiting stool sample for occult blood  -monitor hemodynamics; if continues to downtrend, will obtain CT A/P r/o RP bleed  -transfuse for goal hgb >7  -Trend cbc daily
Stable; anemia workup so far negative  -In setting of heparin gtt  -still awaiting stool sample for occult blood  -monitor hemodynamics; if continues to downtrend, will obtain CT A/P r/o RP bleed  -transfuse for goal hgb >7  -Trend cbc daily
Hgb 11->7.8; f/u retic, stool occult, anemia panel  -In setting of heparin gtt  -monitor hemodynamics; if continues to downtrend, will obtain CT A/P r/o RP bleed  -transfuse for goal hgb >7  -Trend Hgb Q12

## 2020-08-23 NOTE — PROGRESS NOTE ADULT - PROBLEM SELECTOR PROBLEM 4
Malignant neoplasm of right female breast, unspecified estrogen receptor status, unspecified site of breast

## 2020-08-24 ENCOUNTER — TRANSCRIPTION ENCOUNTER (OUTPATIENT)
Age: 45
End: 2020-08-24

## 2020-08-24 VITALS
RESPIRATION RATE: 18 BRPM | HEART RATE: 72 BPM | SYSTOLIC BLOOD PRESSURE: 112 MMHG | DIASTOLIC BLOOD PRESSURE: 73 MMHG | OXYGEN SATURATION: 98 % | TEMPERATURE: 98 F

## 2020-08-24 LAB
CULTURE RESULTS: SIGNIFICANT CHANGE UP
CULTURE RESULTS: SIGNIFICANT CHANGE UP
HCT VFR BLD CALC: 33.1 % — LOW (ref 34.5–45)
HGB BLD-MCNC: 10.6 G/DL — LOW (ref 11.5–15.5)
MCHC RBC-ENTMCNC: 28.3 PG — SIGNIFICANT CHANGE UP (ref 27–34)
MCHC RBC-ENTMCNC: 32 GM/DL — SIGNIFICANT CHANGE UP (ref 32–36)
MCV RBC AUTO: 88.5 FL — SIGNIFICANT CHANGE UP (ref 80–100)
NRBC # BLD: 0 /100 WBCS — SIGNIFICANT CHANGE UP (ref 0–0)
PLATELET # BLD AUTO: 443 K/UL — HIGH (ref 150–400)
RBC # BLD: 3.74 M/UL — LOW (ref 3.8–5.2)
RBC # FLD: 15.6 % — HIGH (ref 10.3–14.5)
SPECIMEN SOURCE: SIGNIFICANT CHANGE UP
SPECIMEN SOURCE: SIGNIFICANT CHANGE UP
WBC # BLD: 18.75 K/UL — HIGH (ref 3.8–10.5)
WBC # FLD AUTO: 18.75 K/UL — HIGH (ref 3.8–10.5)

## 2020-08-24 PROCEDURE — 99239 HOSP IP/OBS DSCHRG MGMT >30: CPT

## 2020-08-24 RX ORDER — PANTOPRAZOLE SODIUM 20 MG/1
1 TABLET, DELAYED RELEASE ORAL
Qty: 7 | Refills: 0
Start: 2020-08-24 | End: 2020-08-30

## 2020-08-24 RX ORDER — IBUPROFEN 200 MG
1 TABLET ORAL
Qty: 21 | Refills: 0
Start: 2020-08-24 | End: 2020-08-30

## 2020-08-24 RX ORDER — DEXAMETHASONE 0.5 MG/5ML
1 ELIXIR ORAL
Qty: 3 | Refills: 0
Start: 2020-08-24 | End: 2020-08-24

## 2020-08-24 RX ADMIN — PANTOPRAZOLE SODIUM 40 MILLIGRAM(S): 20 TABLET, DELAYED RELEASE ORAL at 05:41

## 2020-08-24 RX ADMIN — Medication 2 MILLIGRAM(S): at 05:41

## 2020-08-24 RX ADMIN — OXYCODONE HYDROCHLORIDE 5 MILLIGRAM(S): 5 TABLET ORAL at 12:19

## 2020-08-24 RX ADMIN — ENOXAPARIN SODIUM 80 MILLIGRAM(S): 100 INJECTION SUBCUTANEOUS at 05:41

## 2020-08-24 RX ADMIN — AMLODIPINE BESYLATE 5 MILLIGRAM(S): 2.5 TABLET ORAL at 05:41

## 2020-08-24 RX ADMIN — Medication 2 MILLIGRAM(S): at 13:25

## 2020-08-24 RX ADMIN — Medication 650 MILLIGRAM(S): at 05:41

## 2020-08-24 RX ADMIN — Medication 650 MILLIGRAM(S): at 06:12

## 2020-08-24 NOTE — PROGRESS NOTE ADULT - PROBLEM SELECTOR PLAN 1
- may d/c home with last dose of steroids   - Patient should follow up in ENT office as an outpatient. May see Dr. Eric Brukett or German. Call [phone].

## 2020-08-24 NOTE — DISCHARGE NOTE PROVIDER - HOSPITAL COURSE
45F with PMH of recently diagnosed R breast cancer (started on chemo Doxorubicin/cyclophosphamide in July, last session 8/6/20 via L chest wall mediport, Neulasta on 8/7/20) and HTN p/w L side neck pain and fullness x 5 days, found to have L IJ DVT extending along mediport with retropharyngeal edema and possible PNA.         Internal jugular vein thrombosis, left.  Plan: Seen on CT neck and U/S    - No intervention per IR and vascular team    - per IR note on 8/19, no intervention planned regarding the port    - Per IR, and upon review of the imaging, the thrombus is in the IJ but the catheter goes all the way and drains into right atrium, there should be no contraindications to continuing to use the Mediport    -Continue Lovenox.         Neck swelling.  Plan: Retropharyngeal edema seen on CT; concern for infection    -Leukocytosis in setting of neulasta, last dose 2 weeks ago, and now steroids    -DC zosyn    -ID and ENT recs appreciated, pt re scoped by ENT today and cleared for DC home    -cont decadron taper    -monitor closely for airway compromise; low threshold for ICU consult    -Pain management with ibuprofen X 48 hrs; PPI for now given steroids and NSAIDs; morphine PRN severe pain.         Pneumonia of right lower lobe due to infectious organism.  Plan: -Completed Zosyn    -cultures so far negative    -Per ID notes, RLL findings likely PE, which was reviewed with radiologist, on Lovenox         Malignant neoplasm of right female breast, unspecified estrogen receptor status, unspecified site of breast.  Plan: -Discussed with pt's oncologist Dr Torres: was planned for chemo 8/20; no longer planned in setting of possible infection.         Acute anemia.  Plan: Stable; anemia workup so far negative    -In setting of heparin gtt    -monitor hemodynamics; if continues to downtrend, will obtain CT A/P r/o RP bleed    -transfuse for goal hgb >7    -Trend cbc daily, stable

## 2020-08-24 NOTE — PROGRESS NOTE ADULT - ASSESSMENT
44 yo female newly diagnosed w breat CA w L IJ thrombus and surrounding soft tissue edema likely related to her port and hypercoagulable state from breast cancer. pending re-scope today 44 yo female newly diagnosed w breat CA w L IJ thrombus and surrounding soft tissue edema likely related to her port and hypercoagulable state from breast cancer.  indirect laryngoscopy reveals minimal Left lateral wall edema at the oropharynx level. no obstruction to airway

## 2020-08-24 NOTE — PROGRESS NOTE ADULT - PROVIDER SPECIALTY LIST ADULT
ENT
Infectious Disease
Hospitalist
ENT
Infectious Disease
Vascular Surgery
ENT
Hospitalist

## 2020-08-24 NOTE — DISCHARGE NOTE PROVIDER - CARE PROVIDER_API CALL
Laney Woods  INTERNAL MEDICINE  [address] [place] [address]  Phone: [phone]  Fax: [phone]  Follow Up Time: 1 week    Brendon Khan [facility]  [id number] [place] [address]  Phone: [phone]  Fax: [phone]  Follow Up Time: Routine

## 2020-08-24 NOTE — DISCHARGE NOTE NURSING/CASE MANAGEMENT/SOCIAL WORK - PATIENT PORTAL LINK FT
You can access the FollowMyHealth Patient Portal offered by [facility] by registering at the following website: [email or web address]. By joining [facility]’s FollowMyHealth portal, you will also be able to view your health information using other applications (apps) compatible with our system.

## 2020-08-24 NOTE — PROGRESS NOTE ADULT - SUBJECTIVE AND OBJECTIVE BOX
ENT ISSUE/POD: L neck swelling    HPI: 44 yo female w newly diagnosed breast CA found to have L IJ thrombus. Pt with L neck swelling, notes increase in pain and swelling overnight making swallowing slightly difficult, mild relief with heat packs. Otherwise denies changes/difficulty in breathing, change in voice, sore throat, difficulty tolerating secretions, headache, vision changes    PAST MEDICAL & SURGICAL HISTORY:  Breast cancer  No pertinent past medical history  H/O tubal ligation  No significant past surgical history    Allergies    No Known Allergies    Intolerances      MEDICATIONS  (STANDING):  amLODIPine   Tablet 5 milliGRAM(s) Oral daily  dexAMETHasone     Tablet 2 milliGRAM(s) Oral every 8 hours  enoxaparin Injectable 80 milliGRAM(s) SubCutaneous every 12 hours  pantoprazole    Tablet 40 milliGRAM(s) Oral before breakfast    MEDICATIONS  (PRN):  acetaminophen   Tablet .. 650 milliGRAM(s) Oral every 6 hours PRN Temp greater or equal to 38C (100.4F), Mild Pain (1 - 3)  morphine  - Injectable 4 milliGRAM(s) IV Push every 6 hours PRN Severe Pain (7 - 10)  ondansetron Injectable 4 milliGRAM(s) IV Push every 6 hours PRN Nausea and/or Vomiting  oxyCODONE    IR 5 milliGRAM(s) Oral every 6 hours PRN Moderate Pain (4 - 6)      social history: see consult     family history: see consult     ROS:   ENT: all negative except as noted in HPI   Pulm: denies SOB, cough, hemoptysis  Neuro: denies numbness/tingling, loss of sensation  Endo: denies heat/cold intolerance, excessive sweating      Vital Signs Last 24 Hrs  T(C): 36.8 (24 Aug 2020 05:02), Max: 37.2 (23 Aug 2020 21:09)  T(F): 98.2 (24 Aug 2020 05:02), Max: 99 (23 Aug 2020 21:09)  HR: 61 (24 Aug 2020 05:02) (52 - 76)  BP: 134/86 (24 Aug 2020 05:02) (107/67 - 139/73)  BP(mean): --  RR: 18 (24 Aug 2020 05:02) (17 - 18)  SpO2: 96% (24 Aug 2020 05:02) (96% - 100%)                          10.6   18.75 )-----------( 443      ( 24 Aug 2020 05:19 )             33.1    08-23    137  |  100  |  21  ----------------------------<  111<H>  4.1   |  25  |  0.58    Ca    10.1      23 Aug 2020 06:36  Phos  3.6     08-23  Mg     1.9     08-23         PHYSICAL EXAM:  Gen: NAD  Skin: No rashes, bruises, or lesions  Head: Normocephalic, Atraumatic  Face: no edema, erythema, or fluctuance. Parotid glands soft without mass  Eyes: no scleral injection  Nose: Nares bilaterally patent, no discharge  Mouth: No Stridor / Drooling / Trismus.  Mucosa moist, tongue/uvula midline, oropharynx clear  Neck: moderately indurated L neck swelling from angle of the mandible along the SCM, ttp  Lymphatic: No lymphadenopathy  Resp: breathing easily, no stridor  Neuro: facial nerve intact, no facial droop ENT ISSUE/POD: L neck swelling    HPI: 46 yo female w newly diagnosed breast CA found to have L IJ thrombus. Pt with L neck swelling, notes increase in pain and swelling overnight making swallowing slightly difficult, mild relief with heat packs. Otherwise denies changes/difficulty in breathing, change in voice, sore throat, difficulty tolerating secretions, headache, vision changes    PAST MEDICAL & SURGICAL HISTORY:  Breast cancer  No pertinent past medical history  H/O tubal ligation  No significant past surgical history    Allergies    No Known Allergies    Intolerances      MEDICATIONS  (STANDING):  amLODIPine   Tablet 5 milliGRAM(s) Oral daily  dexAMETHasone     Tablet 2 milliGRAM(s) Oral every 8 hours  enoxaparin Injectable 80 milliGRAM(s) SubCutaneous every 12 hours  pantoprazole    Tablet 40 milliGRAM(s) Oral before breakfast    MEDICATIONS  (PRN):  acetaminophen   Tablet .. 650 milliGRAM(s) Oral every 6 hours PRN Temp greater or equal to 38C (100.4F), Mild Pain (1 - 3)  morphine  - Injectable 4 milliGRAM(s) IV Push every 6 hours PRN Severe Pain (7 - 10)  ondansetron Injectable 4 milliGRAM(s) IV Push every 6 hours PRN Nausea and/or Vomiting  oxyCODONE    IR 5 milliGRAM(s) Oral every 6 hours PRN Moderate Pain (4 - 6)      social history: see consult     family history: see consult     ROS:   ENT: all negative except as noted in HPI   Pulm: denies SOB, cough, hemoptysis  Neuro: denies numbness/tingling, loss of sensation  Endo: denies heat/cold intolerance, excessive sweating      Vital Signs Last 24 Hrs  T(C): 36.8 (24 Aug 2020 05:02), Max: 37.2 (23 Aug 2020 21:09)  T(F): 98.2 (24 Aug 2020 05:02), Max: 99 (23 Aug 2020 21:09)  HR: 61 (24 Aug 2020 05:02) (52 - 76)  BP: 134/86 (24 Aug 2020 05:02) (107/67 - 139/73)  BP(mean): --  RR: 18 (24 Aug 2020 05:02) (17 - 18)  SpO2: 96% (24 Aug 2020 05:02) (96% - 100%)                          10.6   18.75 )-----------( 443      ( 24 Aug 2020 05:19 )             33.1    08-23    137  |  100  |  21  ----------------------------<  111<H>  4.1   |  25  |  0.58    Ca    10.1      23 Aug 2020 06:36  Phos  3.6     08-23  Mg     1.9     08-23         PHYSICAL EXAM:  Gen: NAD  Skin: No rashes, bruises, or lesions  Head: Normocephalic, Atraumatic  Face: no edema, erythema, or fluctuance. Parotid glands soft without mass  Eyes: no scleral injection  Nose: Nares bilaterally patent, no discharge  Mouth: No Stridor / Drooling / Trismus.  Mucosa moist, tongue/uvula midline, oropharynx clear  Neck: moderately indurated L neck swelling from angle of the mandible along the SCM, ttp  Lymphatic: No lymphadenopathy  Resp: breathing easily, no stridor  Neuro: facial nerve intact, no facial droop        Reason for Laryngoscopy:    Patient was unable to cooperate with mirror.  Nasopharynx, oropharynx, and hypopharynx clear, no bleeding. Tongue base, posterior pharyngeal wall, vallecula, epiglottis, and subglottis appear normal. No erythema, edema, pooling of secretions, masses or lesions. Airway patent, no foreign body visualized. No glottic/supraglottic edema. True vocal cords, arytenoids, vestibular folds, ventricles, pyriform sinuses, and aryepiglottic folds appear normal bilaterally. Vocal cords mobile with good contact b/l. minimal Left lateral wall edema at the oropharynx level. no obstruction to airway

## 2020-08-24 NOTE — PROGRESS NOTE ADULT - REASON FOR ADMISSION
L neck pain and fullness

## 2020-08-24 NOTE — DISCHARGE NOTE PROVIDER - NSDCCPCAREPLAN_GEN_ALL_CORE_FT
PRINCIPAL DISCHARGE DIAGNOSIS  Diagnosis: Deep venous thrombosis  Assessment and Plan of Treatment: Left internal jugular vein thrombosis  Continue Lovenox injections as prescribed PRINCIPAL DISCHARGE DIAGNOSIS  Diagnosis: Deep venous thrombosis  Assessment and Plan of Treatment: Left internal jugular vein thrombosis  Continue Lovenox injections as prescribed      SECONDARY DISCHARGE DIAGNOSES  Diagnosis: Neck swelling  Assessment and Plan of Treatment: Retropharyngeal edema seen on CT; concern for infection  -Leukocytosis in setting of neulasta, , and now steroids  -COntinue steroids as prescribed  -Paim control    Diagnosis: Pneumonia of right lower lobe due to infectious organism  Assessment and Plan of Treatment: Pneumonia is a lung infection that can cause a fever, cough, and trouble breathing.  Completed IV antibiotics  Nutrition is important, eat small frequent meals.  Get lots of rest and drink fluids.  Call your health care provider upon arrival home from hospital and make a follow up appointment for one week.  If your cough worsens, you develop fever greater than 101', you have shaking chills, a fast heartbeat, trouble breathing and/or feel your are breathing much faster than usual, call your healthcare provider.  Make sure you wash your hands frequently.      Diagnosis: Acute anemia  Assessment and Plan of Treatment: Now resolved, hemoglobin stable    Diagnosis: Malignant neoplasm of right female breast, unspecified estrogen receptor status, unspecified site of breast  Assessment and Plan of Treatment: Follow up with oncologist  Ok to continue to use Mediport per interventional radiologist

## 2020-08-24 NOTE — DISCHARGE NOTE PROVIDER - PROVIDER TOKENS
PROVIDER:[TOKEN:[[id number]],FOLLOWUP:[1 week]],PROVIDER:[TOKEN:[[id number]],FOLLOWUP:[Routine]]

## 2020-08-24 NOTE — DISCHARGE NOTE PROVIDER - NSDCMRMEDTOKEN_GEN_ALL_CORE_FT
enoxaparin 80 mg/0.8 mL injectable solution: 80 milligram(s) subcutaneously every 12 hours    mg oral tablet: 1 tab(s) orally every 8 hours   Norvasc 5 mg oral tablet: 1 tab(s) orally once a day  Percocet 5 mg-325 mg oral tablet: 1 tab(s) orally every 6 hours MDD:4  Zofran 4 mg oral tablet: 1 tab(s) orally every 8 hours, As Needed dexamethasone 2 mg oral tablet: 1 tab(s) orally every 8 hours  enoxaparin 80 mg/0.8 mL injectable solution: 80 milligram(s) subcutaneously every 12 hours    mg oral tablet: 1 tab(s) orally every 8 hours   Norvasc 5 mg oral tablet: 1 tab(s) orally once a day  pantoprazole 40 mg oral delayed release tablet: 1 tab(s) orally once a day (before a meal)  Percocet 5 mg-325 mg oral tablet: 1 tab(s) orally every 6 hours MDD:4  Zofran 4 mg oral tablet: 1 tab(s) orally every 8 hours, As Needed

## 2020-08-24 NOTE — PROGRESS NOTE ADULT - PROBLEM SELECTOR PROBLEM 1
Internal jugular vein thrombosis, left
Edema of pharynx
Neck swelling
Neck swelling
Internal jugular vein thrombosis, left

## 2020-08-24 NOTE — CHART NOTE - NSCHARTNOTEFT_GEN_A_CORE
Patient seen and examined this morning. Vital signs, labs were reviewed. Patient is medically stable for discharge home. Counseled patient on need to f/u with her oncologist Dr. Woods (has been getting updates from medicine team daily), told her to call for an appointment within the next week. Patient is able to self administer lovenox and will continue taking at home. She will take decadron 2mg every 8 hours for the rest of today and tomorrow and then stop. 35 minutes were spent on coordinating discharge.     Plan discussed with PENNY Fierro.    Miesha Ribeiro DO  Pager #: [phone]

## 2020-10-07 ENCOUNTER — APPOINTMENT (OUTPATIENT)
Dept: THORACIC SURGERY | Facility: CLINIC | Age: 45
End: 2020-10-07
Payer: MEDICAID

## 2020-10-07 VITALS
OXYGEN SATURATION: 99 % | SYSTOLIC BLOOD PRESSURE: 136 MMHG | TEMPERATURE: 97.5 F | DIASTOLIC BLOOD PRESSURE: 80 MMHG | HEART RATE: 89 BPM | HEIGHT: 62 IN | BODY MASS INDEX: 30.73 KG/M2 | WEIGHT: 167 LBS

## 2020-10-07 DIAGNOSIS — F17.200 NICOTINE DEPENDENCE, UNSPECIFIED, UNCOMPLICATED: ICD-10-CM

## 2020-10-07 DIAGNOSIS — Z82.49 FAMILY HISTORY OF ISCHEMIC HEART DISEASE AND OTHER DISEASES OF THE CIRCULATORY SYSTEM: ICD-10-CM

## 2020-10-07 DIAGNOSIS — I10 ESSENTIAL (PRIMARY) HYPERTENSION: ICD-10-CM

## 2020-10-07 DIAGNOSIS — E78.5 HYPERLIPIDEMIA, UNSPECIFIED: ICD-10-CM

## 2020-10-07 PROCEDURE — 99205 OFFICE O/P NEW HI 60 MIN: CPT

## 2020-10-07 RX ORDER — APIXABAN 5 MG/1
5 TABLET, FILM COATED ORAL
Refills: 0 | Status: ACTIVE | COMMUNITY

## 2020-10-07 NOTE — ASSESSMENT
[FreeTextEntry1] : 45 year old female, smoker, with recent diagnosis of breast cancer, undergoing chemo.  She has a RLL lung nodule that decreased in size from 3 x 2.1 cm on 8/18/20 to 2.7 x 1.5 cm on 9/21/20. The decrease in size may be related to the chemo (if it's a metastatic lesion related to the breast ca and shrinking in response to the chemo) or improved secondary to ABX (if infectious).  She is scheduled for a PET scan 10/9/20 which will further eval the nocule as well as the 1.3 cm adrenal nodule noted on the CT angio.  She is a current smoker, so I will schedule her for pulmonary eval and PFT's.  RTO after PFT's and PET and will determine further plan (likely wedge biopsy for tissue).  She is on Eliquis for a jugular thrombosis that will have to be stopped prior to surgery.\par \par Best contact#[phone]\par \par Written by  Clarisa Montoya PA-C acting as a scribe for David Davis MD. The documentation recorded by the scribe accurately reflects the service I personally performed and the decisions made by me, DAVID DAVIS MD.\par

## 2020-10-07 NOTE — PHYSICAL EXAM
[General Appearance - Alert] : alert [General Appearance - In No Acute Distress] : in no acute distress [Sclera] : the sclera and conjunctiva were normal [Extraocular Movements] : extraocular movements were intact [Outer Ear] : the ears and nose were normal in appearance [Neck Appearance] : the appearance of the neck was normal [] : the neck was supple [Neck Cervical Mass (___cm)] : no neck mass was observed [Apical Impulse] : the apical impulse was normal [Heart Rate And Rhythm] : heart rate was normal and rhythm regular [Heart Sounds] : normal S1 and S2 [Fingers] :  capillary refill of the fingers was normal [Bowel Sounds] : normal bowel sounds [Abdomen Soft] : soft [Abdomen Tenderness] : non-tender [Cervical Lymph Nodes Enlarged Posterior Bilaterally] : posterior cervical [Cervical Lymph Nodes Enlarged Anterior Bilaterally] : anterior cervical [Supraclavicular Lymph Nodes Enlarged Bilaterally] : supraclavicular [No CVA Tenderness] : no ~M costovertebral angle tenderness [Abnormal Walk] : normal gait [Skin Color & Pigmentation] : normal skin color and pigmentation [No Focal Deficits] : no focal deficits [Oriented To Time, Place, And Person] : oriented to person, place, and time

## 2020-10-07 NOTE — HISTORY OF PRESENT ILLNESS
[FreeTextEntry1] : 45 year old female recently diagnosed with breast CA (July 2020), currently undergoing chemo, referred to us by Dr. Staton for evaluation of right lung nodule. After mediport placement patient had c/o left sided neck pain and fullness, and CT neck and chest were ordered to eval, the CT neck/chest were done 8/18/20 and noted a dvt of the left internal jugular vein as well as a 3 x 2.1cm RLL lung nodule.  A  CT angio done 9/30/20 for follow up and nodule decreased in size to 2.7 x 1.5 cm. She believes she had a course of ABX between the 2 scans but is not certain.   Patient denies fever, denies chills, denies hemoptysis.  She is a current smoker with a 26 pack year smoking history (she was a pck/day smoker now cut down to 3 cigg/day).  She is on Eliquis for treatment of the jugular DVT

## 2020-10-07 NOTE — REVIEW OF SYSTEMS
[Fever] : no fever [Chills] : no chills [Chest Pain] : no chest pain [Shortness Of Breath] : no shortness of breath [Cough] : no cough [Abdominal Pain] : no abdominal pain [Negative] : Heme/Lymph

## 2020-10-07 NOTE — DATA REVIEWED
[FreeTextEntry1] : \par EXAM: CT NECK SOFT TISSUE IC \par \par EXAM: CT CHEST IC \par \par PROCEDURE DATE: 08/18/2020 \par \par \par INTERPRETATION: CLINICAL INFORMATION: History of breast cancer presenting with left-sided neck pain and fullness. Leukocytosis. \par \par COMPARISON: None. \par \par PROCEDURE: \par 1. CT of the neck soft tissues was performed with intravenous contrast. Sagittal and coronal reformats were generated. \par 2. CT of the Chest was performed with intravenous contrast. Sagittal and coronal reformats were generated \par Intravenous contrast: 90 cc Omnipaque 300 were administered; 10 cc discarded. \par \par \par FINDINGS: \par \par CT NECK: \par \par There is mild enlargement the adenoids. The epiglottis, supraglottic airway, larynx and upper trachea appear within normal limits. There is moderate retropharyngeal edema extending from C1 level to the C7 level. Edema tracks along the left carotid space and left submandibular muscle, and probably tracks along the strap muscles to the lower neck/chest wall.. There is diffuse enlargement and long segment of occlusive deep venous thrombosis within the left internal jugular vein. \par \par There are nonspecific subcentimeter left sided lymph nodes measuring up to 7 mm short access at left level IIa (4:31), 6 mm short access at left level IIb (4:22) 6 x 5 mm at left level IV (4:53), and 8 x 4 mm at left level Vb (4:40). \par \par The parotid glands, submandibular glands and thyroid appear unremarkable. \par \par There is minimal mucosal thickening in the ethmoid air cells, sphenoid sinuses and maxillary sinuses without air-fluid level. \par \par The visualized tympanomastoid cavities are grossly clear. \par \par There is straightening of the cervical lordosis with slight reversal at C5-C6. At C5-C6, there is moderate to severe loss of intervertebral disc space height, a small disc osteophyte complex and bilateral uncovertebral hypertrophy associated with mild spinal canal stenosis, moderate left neural foraminal narrowing and mild right neural foraminal narrowing. \par \par CT CHEST: \par \par LUNGS AND AIRWAYS: Patent central airways. There is an approximately 3 x 2 cm opacity in the right lower lobe (2:110). Right upper lobe calcified granuloma (3:54). \par PLEURA: No pleural effusion. \par MEDIASTINUM AND ANURAG: No lymphadenopathy. \par VESSELS: There is a left chest wall medication port terminating in the right atrium. Again seen is thrombosis of the left internal jugular vein. Thrombus slightly extends along the medication port into the junction of the left internal jugular vein subclavian vein. The left brachiocephalic vein and SVC appear patent. \par HEART: Heart size is normal. No pericardial effusion. \par CHEST WALL: Within normal limits. \par VISUALIZED UPPER ABDOMEN: Within normal limits. \par BONES: Within normal limits. \par \par IMPRESSION: \par CT NECK: \par 1. Long segment of occlusive deep venous thrombosis in the left internal jugular vein. Inferiorly the thrombus slightly extends along the patient's medication port to the junction of the left internal jugular vein and left subclavian vein. The left brachiocephalic vein and SVC are grossly patent. \par 2. Moderate retropharyngeal edema extending from the C1 level to the C7 level. There is also edema tracking along the left carotid space and left sternocleidomastoid muscle to the lower neck/chest wall. No discrete abscess. \par 3. Nonspecific subcentimeter left-sided cervical lymph nodes. \par \par CT CHEST: Approximately 3 x 2 cm right lower lobe opacity is suspicious for pneumonia however pulmonary metastasis is not excluded. A follow-up chest CT is recommended in one month after antibiotic therapy to ensure resolution. \par \par The major findings were discussed with Dr. Crocker 8/18/2020 10:01 PM by Dr. Mccallum with read back confirmation. \par \par SUNNY IRELAND M.D., RADIOLOGY RESIDENT \par This document has been electronically signed. \par LEONORA LEMUS M.D.,ATTENDING RADIOLOGIST \par This document has been electronically signed. Aug 18 2020 11:48PM \par \par \par CT angio chest done at [facility] on 9/30/20 \par Shows RLL nodule decreased fo 2.7 x 1.5 cm.  It also notes the suggestion of a 1.3cm left adrenal lesion. \par The full report as well as the images are uploaded.  \par \par \par

## 2020-10-15 ENCOUNTER — APPOINTMENT (OUTPATIENT)
Dept: PULMONOLOGY | Facility: CLINIC | Age: 45
End: 2020-10-15

## 2020-10-26 DIAGNOSIS — Z01.818 ENCOUNTER FOR OTHER PREPROCEDURAL EXAMINATION: ICD-10-CM

## 2020-10-30 ENCOUNTER — APPOINTMENT (OUTPATIENT)
Dept: DISASTER EMERGENCY | Facility: CLINIC | Age: 45
End: 2020-10-30

## 2020-11-03 ENCOUNTER — APPOINTMENT (OUTPATIENT)
Dept: PULMONOLOGY | Facility: CLINIC | Age: 45
End: 2020-11-03

## 2020-11-10 ENCOUNTER — APPOINTMENT (OUTPATIENT)
Dept: INTERVENTIONAL RADIOLOGY/VASCULAR | Facility: CLINIC | Age: 45
End: 2020-11-10
Payer: MEDICAID

## 2020-11-10 VITALS
SYSTOLIC BLOOD PRESSURE: 110 MMHG | WEIGHT: 167 LBS | TEMPERATURE: 99 F | HEIGHT: 62 IN | HEART RATE: 82 BPM | BODY MASS INDEX: 30.73 KG/M2 | OXYGEN SATURATION: 99 % | DIASTOLIC BLOOD PRESSURE: 79 MMHG | RESPIRATION RATE: 16 BRPM

## 2020-11-10 DIAGNOSIS — R91.1 SOLITARY PULMONARY NODULE: ICD-10-CM

## 2020-11-10 PROCEDURE — 99072 ADDL SUPL MATRL&STAF TM PHE: CPT

## 2020-11-10 PROCEDURE — 99204 OFFICE O/P NEW MOD 45 MIN: CPT

## 2020-11-10 RX ORDER — MELOXICAM 15 MG/1
TABLET ORAL
Refills: 0 | Status: DISCONTINUED | COMMUNITY
End: 2020-11-10

## 2020-11-10 RX ORDER — GRANISETRON HYDROCHLORIDE 1 MG/ML
INJECTION, SOLUTION INTRAVENOUS
Refills: 0 | Status: DISCONTINUED | COMMUNITY
End: 2020-11-10

## 2020-11-10 RX ORDER — FAMOTIDINE 10 MG/1
TABLET, FILM COATED ORAL
Refills: 0 | Status: DISCONTINUED | COMMUNITY
End: 2020-11-10

## 2020-11-10 RX ORDER — PACLITAXEL 6 MG/ML
INJECTION, SOLUTION, CONCENTRATE INTRAVENOUS
Refills: 0 | Status: DISCONTINUED | COMMUNITY
End: 2020-11-10

## 2020-11-10 RX ORDER — PEGFILGRASTIM-CBQV 6 MG/.6ML
INJECTION, SOLUTION SUBCUTANEOUS
Refills: 0 | Status: DISCONTINUED | COMMUNITY
End: 2020-11-10

## 2020-11-16 ENCOUNTER — APPOINTMENT (OUTPATIENT)
Dept: DISASTER EMERGENCY | Facility: CLINIC | Age: 45
End: 2020-11-16

## 2020-11-17 LAB — SARS-COV-2 N GENE NPH QL NAA+PROBE: NOT DETECTED

## 2020-11-19 ENCOUNTER — RESULT REVIEW (OUTPATIENT)
Age: 45
End: 2020-11-19

## 2020-11-19 ENCOUNTER — OUTPATIENT (OUTPATIENT)
Dept: OUTPATIENT SERVICES | Facility: HOSPITAL | Age: 45
LOS: 1 days | End: 2020-11-19
Payer: MEDICAID

## 2020-11-19 DIAGNOSIS — Z98.51 TUBAL LIGATION STATUS: Chronic | ICD-10-CM

## 2020-11-19 DIAGNOSIS — C50.919 MALIGNANT NEOPLASM OF UNSPECIFIED SITE OF UNSPECIFIED FEMALE BREAST: ICD-10-CM

## 2020-11-19 PROCEDURE — 77012 CT SCAN FOR NEEDLE BIOPSY: CPT | Mod: 26

## 2020-11-19 PROCEDURE — 71045 X-RAY EXAM CHEST 1 VIEW: CPT | Mod: 26

## 2020-11-19 PROCEDURE — 88305 TISSUE EXAM BY PATHOLOGIST: CPT | Mod: 26

## 2020-11-19 PROCEDURE — 32405: CPT

## 2020-11-19 PROCEDURE — 88173 CYTOPATH EVAL FNA REPORT: CPT | Mod: 26

## 2020-11-19 PROCEDURE — 88312 SPECIAL STAINS GROUP 1: CPT | Mod: 26

## 2020-11-20 ENCOUNTER — APPOINTMENT (OUTPATIENT)
Dept: PULMONOLOGY | Facility: CLINIC | Age: 45
End: 2020-11-20

## 2020-11-25 DIAGNOSIS — R91.1 SOLITARY PULMONARY NODULE: ICD-10-CM

## 2020-11-25 DIAGNOSIS — J95.811 POSTPROCEDURAL PNEUMOTHORAX: ICD-10-CM

## 2020-12-01 ENCOUNTER — APPOINTMENT (OUTPATIENT)
Dept: DISASTER EMERGENCY | Facility: CLINIC | Age: 45
End: 2020-12-01

## 2020-12-02 LAB — SARS-COV-2 N GENE NPH QL NAA+PROBE: NOT DETECTED

## 2020-12-03 ENCOUNTER — OUTPATIENT (OUTPATIENT)
Dept: OUTPATIENT SERVICES | Facility: HOSPITAL | Age: 45
LOS: 1 days | End: 2020-12-03
Payer: MEDICAID

## 2020-12-03 VITALS
DIASTOLIC BLOOD PRESSURE: 82 MMHG | RESPIRATION RATE: 18 BRPM | OXYGEN SATURATION: 99 % | TEMPERATURE: 99 F | HEIGHT: 64 IN | WEIGHT: 164.91 LBS | HEART RATE: 82 BPM | SYSTOLIC BLOOD PRESSURE: 126 MMHG

## 2020-12-03 DIAGNOSIS — I82.409 ACUTE EMBOLISM AND THROMBOSIS OF UNSPECIFIED DEEP VEINS OF UNSPECIFIED LOWER EXTREMITY: ICD-10-CM

## 2020-12-03 DIAGNOSIS — R91.1 SOLITARY PULMONARY NODULE: ICD-10-CM

## 2020-12-03 DIAGNOSIS — Z98.890 OTHER SPECIFIED POSTPROCEDURAL STATES: Chronic | ICD-10-CM

## 2020-12-03 DIAGNOSIS — Z90.721 ACQUIRED ABSENCE OF OVARIES, UNILATERAL: Chronic | ICD-10-CM

## 2020-12-03 DIAGNOSIS — Z98.51 TUBAL LIGATION STATUS: Chronic | ICD-10-CM

## 2020-12-03 DIAGNOSIS — R09.89 OTHER SPECIFIED SYMPTOMS AND SIGNS INVOLVING THE CIRCULATORY AND RESPIRATORY SYSTEMS: ICD-10-CM

## 2020-12-03 DIAGNOSIS — N83.209 UNSPECIFIED OVARIAN CYST, UNSPECIFIED SIDE: Chronic | ICD-10-CM

## 2020-12-03 DIAGNOSIS — I10 ESSENTIAL (PRIMARY) HYPERTENSION: ICD-10-CM

## 2020-12-03 LAB
ALBUMIN SERPL ELPH-MCNC: 4.3 G/DL — SIGNIFICANT CHANGE UP (ref 3.3–5)
ALP SERPL-CCNC: 102 U/L — SIGNIFICANT CHANGE UP (ref 40–120)
ALT FLD-CCNC: 29 U/L — SIGNIFICANT CHANGE UP (ref 4–33)
ANION GAP SERPL CALC-SCNC: 10 MMO/L — SIGNIFICANT CHANGE UP (ref 7–14)
AST SERPL-CCNC: 17 U/L — SIGNIFICANT CHANGE UP (ref 4–32)
BILIRUB SERPL-MCNC: < 0.2 MG/DL — LOW (ref 0.2–1.2)
BLD GP AB SCN SERPL QL: NEGATIVE — SIGNIFICANT CHANGE UP
BUN SERPL-MCNC: 13 MG/DL — SIGNIFICANT CHANGE UP (ref 7–23)
CALCIUM SERPL-MCNC: 10.4 MG/DL — SIGNIFICANT CHANGE UP (ref 8.4–10.5)
CHLORIDE SERPL-SCNC: 103 MMOL/L — SIGNIFICANT CHANGE UP (ref 98–107)
CO2 SERPL-SCNC: 30 MMOL/L — SIGNIFICANT CHANGE UP (ref 22–31)
CREAT SERPL-MCNC: 0.7 MG/DL — SIGNIFICANT CHANGE UP (ref 0.5–1.3)
GLUCOSE SERPL-MCNC: 70 MG/DL — SIGNIFICANT CHANGE UP (ref 70–99)
HCT VFR BLD CALC: 37.2 % — SIGNIFICANT CHANGE UP (ref 34.5–45)
HGB BLD-MCNC: 11.9 G/DL — SIGNIFICANT CHANGE UP (ref 11.5–15.5)
MCHC RBC-ENTMCNC: 29.2 PG — SIGNIFICANT CHANGE UP (ref 27–34)
MCHC RBC-ENTMCNC: 32 % — SIGNIFICANT CHANGE UP (ref 32–36)
MCV RBC AUTO: 91.2 FL — SIGNIFICANT CHANGE UP (ref 80–100)
NRBC # FLD: 0 K/UL — SIGNIFICANT CHANGE UP (ref 0–0)
PLATELET # BLD AUTO: 283 K/UL — SIGNIFICANT CHANGE UP (ref 150–400)
PMV BLD: 11.2 FL — SIGNIFICANT CHANGE UP (ref 7–13)
POTASSIUM SERPL-MCNC: 4.1 MMOL/L — SIGNIFICANT CHANGE UP (ref 3.5–5.3)
POTASSIUM SERPL-SCNC: 4.1 MMOL/L — SIGNIFICANT CHANGE UP (ref 3.5–5.3)
PROT SERPL-MCNC: 7.3 G/DL — SIGNIFICANT CHANGE UP (ref 6–8.3)
RBC # BLD: 4.08 M/UL — SIGNIFICANT CHANGE UP (ref 3.8–5.2)
RBC # FLD: 14.2 % — SIGNIFICANT CHANGE UP (ref 10.3–14.5)
RH IG SCN BLD-IMP: POSITIVE — SIGNIFICANT CHANGE UP
SODIUM SERPL-SCNC: 143 MMOL/L — SIGNIFICANT CHANGE UP (ref 135–145)
WBC # BLD: 7.07 K/UL — SIGNIFICANT CHANGE UP (ref 3.8–10.5)
WBC # FLD AUTO: 7.07 K/UL — SIGNIFICANT CHANGE UP (ref 3.8–10.5)

## 2020-12-03 PROCEDURE — 93010 ELECTROCARDIOGRAM REPORT: CPT

## 2020-12-03 RX ORDER — SODIUM CHLORIDE 9 MG/ML
1000 INJECTION, SOLUTION INTRAVENOUS
Refills: 0 | Status: DISCONTINUED | OUTPATIENT
Start: 2020-12-08 | End: 2020-12-09

## 2020-12-03 NOTE — H&P PST ADULT - NSICDXPROBLEM_GEN_ALL_CORE_FT
PROBLEM DIAGNOSES  Problem: Solitary pulmonary nodule  Assessment and Plan: Scheduled for right video assisted thoracoscopy, robotic assisted right lower lobe wedge resection on 12/8/2020. labs done and results pending.  Verbal and written preop instruction given and explained. Patient verbalized understanding. Chlorhexidine antiseptic solution with written and verbal instruction given & Patient verbalized understanding with return demonstration. Famotidine provided with instruction. Patient verbalized understanding. Patient is made aware of COVID 19 testing prior to surgery.     Problem: Hypertension  Assessment and Plan: Instructed to take amlodipine AM of surgery with a sip of water.     Problem: Deep vein thrombosis (DVT)  Assessment and Plan: dx with DVT of the left internal jugular vein and on Eliquis twice a day. Patient states, she was instructed by oncologist Dr. Woods to stop Eliquis on 12/5/2020, and start Lovenox on 12/6/2020 - last dose for lovenox is on 12/7 AM. Will request for written instruction.

## 2020-12-03 NOTE — H&P PST ADULT - NSANTHOSAYNRD_GEN_A_CORE
No. KRISTIN screening performed.  STOP BANG Legend: 0-2 = LOW Risk; 3-4 = INTERMEDIATE Risk; 5-8 = HIGH Risk

## 2020-12-03 NOTE — H&P PST ADULT - NEGATIVE NEUROLOGICAL SYMPTOMS
no weakness/no paresthesias/no generalized seizures/no syncope/no tremors/no vertigo/no difficulty walking/no headache

## 2020-12-03 NOTE — H&P PST ADULT - NSICDXPASTMEDICALHX_GEN_ALL_CORE_FT
PAST MEDICAL HISTORY:  Breast cancer dx in 6/2020    Deep vein thrombosis (DVT) left  internal jugular vein (dx 8/2020 on Eliquis)    Hypertension      PAST MEDICAL HISTORY:  Breast cancer dx in 6/2020, s/p chemo (completed in 10/2020)    Deep vein thrombosis (DVT) left  internal jugular vein (dx 8/2020 on Eliquis)    Hypertension

## 2020-12-03 NOTE — H&P PST ADULT - NSICDXPASTSURGICALHX_GEN_ALL_CORE_FT
PAST SURGICAL HISTORY:  H/O tubal ligation     S/P biopsy lung 11/2020     PAST SURGICAL HISTORY:  H/O tubal ligation 2008    Ovarian cyst removed in 6/2008    S/P biopsy lung 11/2020    S/P right oophorectomy 6/2015

## 2020-12-03 NOTE — H&P PST ADULT - HISTORY OF PRESENT ILLNESS
44 yo female with hx of HTN, breast CA (dx in 7/2020) s/p chemo (completed in 10/2020) presents to have PST evaluation for right video assisted thoracoscopy robotic assisted right lower lobe wedge resection. Patient reports, c/o left neck swelling and pain, presented to ER, had CT scan of neck/chest done, which revealed "nodule in right lung", s/p biopsy. Patient was referred to Dr. Davis for further evaluation, surgical intervention was recommended.  46 yo female with hx of HTN, breast CA (dx in 7/2020) s/p chemo (completed in 10/2020) presents to have PST evaluation for right video assisted thoracoscopy robotic assisted right lower lobe wedge resection. Patient reports, c/o left neck swelling and pain, presented to ER, had CT scan of neck/chest done, which revealed "nodule in right lung", s/p biopsy- inconclusive, Patient was referred to Dr. Davis for further evaluation, surgical intervention was recommended. Patient denies CP, dyspnea, cough.

## 2020-12-03 NOTE — H&P PST ADULT - NEGATIVE GENERAL GENITOURINARY SYMPTOMS
no hematuria/no flank pain L/no flank pain R/no urine discoloration/no bladder infections/no dysuria/no urinary hesitancy/normal urinary frequency

## 2020-12-03 NOTE — H&P PST ADULT - VENOUS THROMBOEMBOLISM
9/21/2019  8:20 AM    Laboring Epidural Follow-up Note     Referring physician: Jimmie Jc MD   Patient status post vaginal delivery with labor epidural    Visit Vitals  /75 (BP 1 Location: Left arm, BP Patient Position: At rest)   Pulse 75   Temp 36.7 °C (98 °F)   Resp 18   SpO2 97%   Breastfeeding? Unknown       Epidural removed by L&D staff  No sedation, pruritis noted. Adequate analgesia.   No obvious anesthesia complications          Yuliya Hussain Osgood, CRNA
suspected

## 2020-12-03 NOTE — H&P PST ADULT - ATTENDING COMMENTS
patient with RLL nodule - h/o breast cancer awaiting breast surgery - IR biopsy showing necrotic tissue. given h/o breast cancer - will plan for robotic right robotic vats, wedge biopsy of rll . wedge for either inflammatory or breast met but if adeno and cannot determine origin of lung or breast would not want to proceed with lobectomy if origin from breast ca.   patient understood plan for wedge only today given inconclusive IR biopsy .   discussed procedure including risks including but not limited to bleeding, infection, scar tissue, injury to surrounding structures, prolonged air leak, DVT,   patient understood and agreeable. last lovenox dose yesterday morning - changed from eliquis saturday

## 2020-12-03 NOTE — H&P PST ADULT - NEGATIVE ENMT SYMPTOMS
no hearing difficulty/no ear pain/no vertigo/no sinus symptoms/no nasal congestion/no nasal discharge/no nasal obstruction/no post-nasal discharge/no throat pain/no dysphagia

## 2020-12-04 ENCOUNTER — APPOINTMENT (OUTPATIENT)
Dept: DISASTER EMERGENCY | Facility: CLINIC | Age: 45
End: 2020-12-04

## 2020-12-04 ENCOUNTER — APPOINTMENT (OUTPATIENT)
Dept: PULMONOLOGY | Facility: CLINIC | Age: 45
End: 2020-12-04
Payer: MEDICAID

## 2020-12-04 VITALS
BODY MASS INDEX: 28.51 KG/M2 | HEART RATE: 88 BPM | WEIGHT: 167 LBS | TEMPERATURE: 97.6 F | HEIGHT: 64 IN | OXYGEN SATURATION: 98 %

## 2020-12-04 PROCEDURE — 94729 DIFFUSING CAPACITY: CPT

## 2020-12-04 PROCEDURE — 94726 PLETHYSMOGRAPHY LUNG VOLUMES: CPT

## 2020-12-04 PROCEDURE — 94010 BREATHING CAPACITY TEST: CPT

## 2020-12-04 PROCEDURE — 99072 ADDL SUPL MATRL&STAF TM PHE: CPT

## 2020-12-05 ENCOUNTER — APPOINTMENT (OUTPATIENT)
Dept: DISASTER EMERGENCY | Facility: CLINIC | Age: 45
End: 2020-12-05

## 2020-12-06 LAB — SARS-COV-2 N GENE NPH QL NAA+PROBE: NOT DETECTED

## 2020-12-07 RX ORDER — HEPARIN SODIUM 5000 [USP'U]/ML
5000 INJECTION INTRAVENOUS; SUBCUTANEOUS ONCE
Refills: 0 | Status: COMPLETED | OUTPATIENT
Start: 2020-12-08 | End: 2020-12-08

## 2020-12-07 RX ORDER — GABAPENTIN 400 MG/1
300 CAPSULE ORAL ONCE
Refills: 0 | Status: COMPLETED | OUTPATIENT
Start: 2020-12-08 | End: 2020-12-08

## 2020-12-07 RX ORDER — ACETAMINOPHEN 500 MG
975 TABLET ORAL ONCE
Refills: 0 | Status: COMPLETED | OUTPATIENT
Start: 2020-12-08 | End: 2020-12-08

## 2020-12-07 NOTE — ASU PATIENT PROFILE, ADULT - PSH
H/O tubal ligation  2008  Ovarian cyst  removed in 6/2008  S/P biopsy  lung 11/2020  S/P right oophorectomy  6/2015

## 2020-12-07 NOTE — ASU PATIENT PROFILE, ADULT - PMH
Breast cancer  dx in 6/2020, s/p chemo (completed in 10/2020)  Deep vein thrombosis (DVT)  left  internal jugular vein (dx 8/2020 on Eliquis)  Hypertension

## 2020-12-08 ENCOUNTER — APPOINTMENT (OUTPATIENT)
Dept: PULMONOLOGY | Facility: CLINIC | Age: 45
End: 2020-12-08

## 2020-12-08 ENCOUNTER — TRANSCRIPTION ENCOUNTER (OUTPATIENT)
Age: 45
End: 2020-12-08

## 2020-12-08 ENCOUNTER — APPOINTMENT (OUTPATIENT)
Dept: THORACIC SURGERY | Facility: HOSPITAL | Age: 45
End: 2020-12-08

## 2020-12-08 ENCOUNTER — RESULT REVIEW (OUTPATIENT)
Age: 45
End: 2020-12-08

## 2020-12-08 ENCOUNTER — INPATIENT (INPATIENT)
Facility: HOSPITAL | Age: 45
LOS: 0 days | Discharge: HOME | End: 2020-12-09
Attending: STUDENT IN AN ORGANIZED HEALTH CARE EDUCATION/TRAINING PROGRAM | Admitting: STUDENT IN AN ORGANIZED HEALTH CARE EDUCATION/TRAINING PROGRAM
Payer: MEDICAID

## 2020-12-08 VITALS
RESPIRATION RATE: 18 BRPM | HEIGHT: 64 IN | TEMPERATURE: 99 F | DIASTOLIC BLOOD PRESSURE: 82 MMHG | HEART RATE: 96 BPM | SYSTOLIC BLOOD PRESSURE: 137 MMHG | WEIGHT: 164.91 LBS | OXYGEN SATURATION: 99 %

## 2020-12-08 DIAGNOSIS — Z98.890 OTHER SPECIFIED POSTPROCEDURAL STATES: Chronic | ICD-10-CM

## 2020-12-08 DIAGNOSIS — R91.1 SOLITARY PULMONARY NODULE: ICD-10-CM

## 2020-12-08 DIAGNOSIS — Z90.721 ACQUIRED ABSENCE OF OVARIES, UNILATERAL: Chronic | ICD-10-CM

## 2020-12-08 DIAGNOSIS — N83.209 UNSPECIFIED OVARIAN CYST, UNSPECIFIED SIDE: Chronic | ICD-10-CM

## 2020-12-08 DIAGNOSIS — Z98.51 TUBAL LIGATION STATUS: Chronic | ICD-10-CM

## 2020-12-08 LAB — RH IG SCN BLD-IMP: POSITIVE — SIGNIFICANT CHANGE UP

## 2020-12-08 PROCEDURE — 88331 PATH CONSLTJ SURG 1 BLK 1SPC: CPT | Mod: 26

## 2020-12-08 PROCEDURE — 88307 TISSUE EXAM BY PATHOLOGIST: CPT | Mod: 26

## 2020-12-08 PROCEDURE — 32666 THORACOSCOPY W/WEDGE RESECT: CPT | Mod: AS,RT

## 2020-12-08 PROCEDURE — 88313 SPECIAL STAINS GROUP 2: CPT | Mod: 26

## 2020-12-08 PROCEDURE — 71045 X-RAY EXAM CHEST 1 VIEW: CPT | Mod: 26

## 2020-12-08 PROCEDURE — 32666 THORACOSCOPY W/WEDGE RESECT: CPT | Mod: RT

## 2020-12-08 PROCEDURE — 32652 THORACOSCOPY REM TOTL CORTEX: CPT | Mod: AS

## 2020-12-08 PROCEDURE — 32652 THORACOSCOPY REM TOTL CORTEX: CPT

## 2020-12-08 PROCEDURE — 88309 TISSUE EXAM BY PATHOLOGIST: CPT | Mod: 26

## 2020-12-08 PROCEDURE — 32667 THORACOSCOPY W/W RESECT ADDL: CPT

## 2020-12-08 PROCEDURE — S2900 ROBOTIC SURGICAL SYSTEM: CPT | Mod: NC

## 2020-12-08 PROCEDURE — 32667 THORACOSCOPY W/W RESECT ADDL: CPT | Mod: AS

## 2020-12-08 PROCEDURE — 88305 TISSUE EXAM BY PATHOLOGIST: CPT | Mod: 26

## 2020-12-08 RX ORDER — NALOXONE HYDROCHLORIDE 4 MG/.1ML
0.1 SPRAY NASAL
Refills: 0 | Status: DISCONTINUED | OUTPATIENT
Start: 2020-12-08 | End: 2020-12-09

## 2020-12-08 RX ORDER — METOCLOPRAMIDE HCL 10 MG
10 TABLET ORAL ONCE
Refills: 0 | Status: DISCONTINUED | OUTPATIENT
Start: 2020-12-08 | End: 2020-12-08

## 2020-12-08 RX ORDER — HEPARIN SODIUM 5000 [USP'U]/ML
5000 INJECTION INTRAVENOUS; SUBCUTANEOUS EVERY 8 HOURS
Refills: 0 | Status: DISCONTINUED | OUTPATIENT
Start: 2020-12-08 | End: 2020-12-09

## 2020-12-08 RX ORDER — ONDANSETRON 8 MG/1
4 TABLET, FILM COATED ORAL ONCE
Refills: 0 | Status: DISCONTINUED | OUTPATIENT
Start: 2020-12-08 | End: 2020-12-08

## 2020-12-08 RX ORDER — SENNA PLUS 8.6 MG/1
2 TABLET ORAL AT BEDTIME
Refills: 0 | Status: DISCONTINUED | OUTPATIENT
Start: 2020-12-08 | End: 2020-12-09

## 2020-12-08 RX ORDER — ONDANSETRON 8 MG/1
4 TABLET, FILM COATED ORAL EVERY 6 HOURS
Refills: 0 | Status: DISCONTINUED | OUTPATIENT
Start: 2020-12-08 | End: 2020-12-09

## 2020-12-08 RX ORDER — HYDROMORPHONE HYDROCHLORIDE 2 MG/ML
0.5 INJECTION INTRAMUSCULAR; INTRAVENOUS; SUBCUTANEOUS
Refills: 0 | Status: DISCONTINUED | OUTPATIENT
Start: 2020-12-08 | End: 2020-12-09

## 2020-12-08 RX ORDER — ACETAMINOPHEN 500 MG
975 TABLET ORAL EVERY 6 HOURS
Refills: 0 | Status: DISCONTINUED | OUTPATIENT
Start: 2020-12-08 | End: 2020-12-09

## 2020-12-08 RX ORDER — HYDROMORPHONE HYDROCHLORIDE 2 MG/ML
0.5 INJECTION INTRAMUSCULAR; INTRAVENOUS; SUBCUTANEOUS
Refills: 0 | Status: DISCONTINUED | OUTPATIENT
Start: 2020-12-08 | End: 2020-12-08

## 2020-12-08 RX ORDER — HYDROMORPHONE HYDROCHLORIDE 2 MG/ML
30 INJECTION INTRAMUSCULAR; INTRAVENOUS; SUBCUTANEOUS
Refills: 0 | Status: DISCONTINUED | OUTPATIENT
Start: 2020-12-08 | End: 2020-12-09

## 2020-12-08 RX ORDER — FAMOTIDINE 10 MG/ML
20 INJECTION INTRAVENOUS EVERY 12 HOURS
Refills: 0 | Status: DISCONTINUED | OUTPATIENT
Start: 2020-12-08 | End: 2020-12-09

## 2020-12-08 RX ADMIN — HEPARIN SODIUM 5000 UNIT(S): 5000 INJECTION INTRAVENOUS; SUBCUTANEOUS at 14:49

## 2020-12-08 RX ADMIN — HYDROMORPHONE HYDROCHLORIDE 30 MILLILITER(S): 2 INJECTION INTRAMUSCULAR; INTRAVENOUS; SUBCUTANEOUS at 19:28

## 2020-12-08 RX ADMIN — HYDROMORPHONE HYDROCHLORIDE 0.5 MILLIGRAM(S): 2 INJECTION INTRAMUSCULAR; INTRAVENOUS; SUBCUTANEOUS at 18:25

## 2020-12-08 RX ADMIN — SENNA PLUS 2 TABLET(S): 8.6 TABLET ORAL at 22:54

## 2020-12-08 RX ADMIN — SODIUM CHLORIDE 30 MILLILITER(S): 9 INJECTION, SOLUTION INTRAVENOUS at 18:30

## 2020-12-08 RX ADMIN — Medication 975 MILLIGRAM(S): at 20:55

## 2020-12-08 RX ADMIN — HEPARIN SODIUM 5000 UNIT(S): 5000 INJECTION INTRAVENOUS; SUBCUTANEOUS at 22:54

## 2020-12-08 RX ADMIN — HYDROMORPHONE HYDROCHLORIDE 0.5 MILLIGRAM(S): 2 INJECTION INTRAMUSCULAR; INTRAVENOUS; SUBCUTANEOUS at 18:14

## 2020-12-08 RX ADMIN — GABAPENTIN 300 MILLIGRAM(S): 400 CAPSULE ORAL at 14:27

## 2020-12-08 RX ADMIN — Medication 975 MILLIGRAM(S): at 14:26

## 2020-12-08 RX ADMIN — Medication 975 MILLIGRAM(S): at 21:52

## 2020-12-08 NOTE — DISCHARGE NOTE PROVIDER - NSDCFUADDAPPT_GEN_ALL_CORE_FT
Follow up with Dr. Davis in 2 weeks   Chest x-ray prior to appointment with Dr. Davis  Follow up with primary care provider in one week

## 2020-12-08 NOTE — DISCHARGE NOTE PROVIDER - CARE PROVIDER_API CALL
Lauren Davis E  SURGERY  [id number] [place] [address] [place] [address]  Phone: [phone]  Fax: [phone]  Follow Up Time:

## 2020-12-08 NOTE — ASU PREOP CHECKLIST - 2.
Pt stopped taking Eliquis on Saturday 12/5/20 and [id number] taking Lovenox injections - Last injection was yesterday 10:00 a.m.

## 2020-12-08 NOTE — DISCHARGE NOTE PROVIDER - NSDCCPCAREPLAN_GEN_ALL_CORE_FT
PRINCIPAL DISCHARGE DIAGNOSIS  Diagnosis: Solitary pulmonary nodule  Assessment and Plan of Treatment:

## 2020-12-08 NOTE — DISCHARGE NOTE PROVIDER - HOSPITAL COURSE
44 yo female with hx of HTN, breast CA (dx in 7/2020) s/p chemo (completed in 10/2020), c/o left neck swelling and pain, presented to ER, had CT scan of neck/chest done, which revealed "nodule in right lung", s/p biopsy- inconclusive.  Patient s/p flex bronch, robotic assisted right vats, wedge resection x2 on 12/8/20.  Post op course without complication, patient stable for discharge home when chest tube removed.

## 2020-12-08 NOTE — DISCHARGE NOTE PROVIDER - NSDCMRMEDTOKEN_GEN_ALL_CORE_FT
amLODIPine 5 mg oral tablet: 1 tab(s) orally once a day  Eliquis 5 mg oral tablet: 1 tab(s) orally 2 times a day/ last dose 12/5/20  Lovenox 40 mg/0.4 mL injectable solution: injectable once a day   acetaminophen 325 mg oral tablet: 3 tab(s) orally every 6 hours  amLODIPine 5 mg oral tablet: 1 tab(s) orally once a day  chest xray PA and lateral: Dx s/p RVATS, lung resection  Eliquis 5 mg oral tablet: 1 tab(s) orally 2 times a day/ last dose 12/5/20  oxyCODONE 5 mg oral tablet: 1 tab(s) orally every 4 hours, As needed, Moderate Pain (4 - 6) MDD:6  senna oral tablet: 2 tab(s) orally once a day (at bedtime)

## 2020-12-08 NOTE — BRIEF OPERATIVE NOTE - OPERATION/FINDINGS
Flexible bronchoscopy, right robo-assisted VATS with wedge resection of RLL mass and RML nodule with pneumolysis. Frozen showed non-specific necrosis. Culture sent. 28F R CT placed.

## 2020-12-08 NOTE — DISCHARGE NOTE PROVIDER - NSDCFUADDINST_GEN_ALL_CORE_FT
Please walk 4-5 x per day; increase as tolerated. You may climb stairs. Continue to use the incentive spirometer.   You may keep wounds uncovered. Please shower daily with soap and water. The suture will be removed in the office at the follow up appointment.   Please call the office at [phone] if you have fevers, chills, worsening shortness of breath, chest pain, warmth, redness or purulent discharge from the wound.

## 2020-12-08 NOTE — CHART NOTE - NSCHARTNOTEFT_GEN_A_CORE
Patient s/p flex bronch, robotic assisted right vats, wedge resection x2.  Patient complaining of incisional pain, IV PCA in place.  Chest tube to suction, no air leak noted.  Incision with dressing clean and dry.    Vital Signs Last 24 Hrs  T(C): 36.5 (08 Dec 2020 21:00), Max: 37.1 (08 Dec 2020 13:48)  T(F): 97.7 (08 Dec 2020 21:00), Max: 98.8 (08 Dec 2020 13:48)  HR: 75 (08 Dec 2020 21:00) (69 - 96)  BP: 112/70 (08 Dec 2020 21:00) (106/63 - 140/85)  BP(mean): 80 (08 Dec 2020 21:00) (73 - 95)  RR: 17 (08 Dec 2020 21:00) (12 - 20)  SpO2: 98% (08 Dec 2020 21:00) (94% - 100%)    I&O's Detail    08 Dec 2020 07:01  -  08 Dec 2020 22:40  --------------------------------------------------------  IN:    Lactated Ringers: 150 mL    Oral Fluid: 240 mL  Total IN: 390 mL    OUT:    Chest Tube (mL): 10 mL  Total OUT: 10 mL    Total NET: 380 mL      MEDICATIONS  (STANDING):  acetaminophen   Tablet .. 975 milliGRAM(s) Oral every 6 hours  famotidine    Tablet 20 milliGRAM(s) Oral every 12 hours  heparin   Injectable 5000 Unit(s) SubCutaneous every 8 hours  HYDROmorphone PCA (1 mG/mL) 30 milliLiter(s) PCA Continuous PCA Continuous  lactated ringers. 1000 milliLiter(s) (30 mL/Hr) IV Continuous <Continuous>  senna 2 Tablet(s) Oral at bedtime    A/P: S/P Flex Bronch, Robotic assisted right vats, wedge resection x2   Continue chest tube to suction, will place to water seal at midnight   Follow up labs and CXR in am   Chest PT, ambulation and incentive spirometer  Continue IV PCA for pain management

## 2020-12-09 ENCOUNTER — TRANSCRIPTION ENCOUNTER (OUTPATIENT)
Age: 45
End: 2020-12-09

## 2020-12-09 VITALS
SYSTOLIC BLOOD PRESSURE: 131 MMHG | OXYGEN SATURATION: 97 % | HEART RATE: 85 BPM | DIASTOLIC BLOOD PRESSURE: 78 MMHG | TEMPERATURE: 99 F | RESPIRATION RATE: 18 BRPM

## 2020-12-09 LAB
ANION GAP SERPL CALC-SCNC: 14 MMOL/L — SIGNIFICANT CHANGE UP (ref 7–14)
BASOPHILS # BLD AUTO: 0.02 K/UL — SIGNIFICANT CHANGE UP (ref 0–0.2)
BASOPHILS NFR BLD AUTO: 0.1 % — SIGNIFICANT CHANGE UP (ref 0–2)
BUN SERPL-MCNC: 20 MG/DL — SIGNIFICANT CHANGE UP (ref 7–23)
CALCIUM SERPL-MCNC: 10.3 MG/DL — SIGNIFICANT CHANGE UP (ref 8.4–10.5)
CHLORIDE SERPL-SCNC: 99 MMOL/L — SIGNIFICANT CHANGE UP (ref 98–107)
CO2 SERPL-SCNC: 24 MMOL/L — SIGNIFICANT CHANGE UP (ref 22–31)
CREAT SERPL-MCNC: 0.51 MG/DL — SIGNIFICANT CHANGE UP (ref 0.5–1.3)
EOSINOPHIL # BLD AUTO: 0 K/UL — SIGNIFICANT CHANGE UP (ref 0–0.5)
EOSINOPHIL NFR BLD AUTO: 0 % — SIGNIFICANT CHANGE UP (ref 0–6)
GLUCOSE SERPL-MCNC: 133 MG/DL — HIGH (ref 70–99)
HCT VFR BLD CALC: 36.3 % — SIGNIFICANT CHANGE UP (ref 34.5–45)
HGB BLD-MCNC: 11.4 G/DL — LOW (ref 11.5–15.5)
IANC: 15.82 K/UL — HIGH (ref 1.5–8.5)
IMM GRANULOCYTES NFR BLD AUTO: 0.3 % — SIGNIFICANT CHANGE UP (ref 0–1.5)
LYMPHOCYTES # BLD AUTO: 1.09 K/UL — SIGNIFICANT CHANGE UP (ref 1–3.3)
LYMPHOCYTES # BLD AUTO: 6.1 % — LOW (ref 13–44)
MCHC RBC-ENTMCNC: 28.6 PG — SIGNIFICANT CHANGE UP (ref 27–34)
MCHC RBC-ENTMCNC: 31.4 GM/DL — LOW (ref 32–36)
MCV RBC AUTO: 91.2 FL — SIGNIFICANT CHANGE UP (ref 80–100)
MONOCYTES # BLD AUTO: 0.8 K/UL — SIGNIFICANT CHANGE UP (ref 0–0.9)
MONOCYTES NFR BLD AUTO: 4.5 % — SIGNIFICANT CHANGE UP (ref 2–14)
NEUTROPHILS # BLD AUTO: 15.82 K/UL — HIGH (ref 1.8–7.4)
NEUTROPHILS NFR BLD AUTO: 89 % — HIGH (ref 43–77)
NRBC # BLD: 0 /100 WBCS — SIGNIFICANT CHANGE UP
NRBC # FLD: 0 K/UL — SIGNIFICANT CHANGE UP
PLATELET # BLD AUTO: 301 K/UL — SIGNIFICANT CHANGE UP (ref 150–400)
POTASSIUM SERPL-MCNC: 3.9 MMOL/L — SIGNIFICANT CHANGE UP (ref 3.5–5.3)
POTASSIUM SERPL-SCNC: 3.9 MMOL/L — SIGNIFICANT CHANGE UP (ref 3.5–5.3)
RBC # BLD: 3.98 M/UL — SIGNIFICANT CHANGE UP (ref 3.8–5.2)
RBC # FLD: 14.2 % — SIGNIFICANT CHANGE UP (ref 10.3–14.5)
SODIUM SERPL-SCNC: 137 MMOL/L — SIGNIFICANT CHANGE UP (ref 135–145)
WBC # BLD: 17.78 K/UL — HIGH (ref 3.8–10.5)
WBC # FLD AUTO: 17.78 K/UL — HIGH (ref 3.8–10.5)

## 2020-12-09 PROCEDURE — 71045 X-RAY EXAM CHEST 1 VIEW: CPT | Mod: 26

## 2020-12-09 RX ORDER — OXYCODONE HYDROCHLORIDE 5 MG/1
10 TABLET ORAL EVERY 4 HOURS
Refills: 0 | Status: DISCONTINUED | OUTPATIENT
Start: 2020-12-09 | End: 2020-12-09

## 2020-12-09 RX ORDER — SENNA PLUS 8.6 MG/1
2 TABLET ORAL
Qty: 0 | Refills: 0 | DISCHARGE
Start: 2020-12-09

## 2020-12-09 RX ORDER — OXYCODONE HYDROCHLORIDE 5 MG/1
1 TABLET ORAL
Qty: 30 | Refills: 0
Start: 2020-12-09 | End: 2020-12-13

## 2020-12-09 RX ORDER — OXYCODONE HYDROCHLORIDE 5 MG/1
5 TABLET ORAL EVERY 4 HOURS
Refills: 0 | Status: DISCONTINUED | OUTPATIENT
Start: 2020-12-09 | End: 2020-12-09

## 2020-12-09 RX ORDER — ACETAMINOPHEN 500 MG
3 TABLET ORAL
Qty: 0 | Refills: 0 | DISCHARGE
Start: 2020-12-09

## 2020-12-09 RX ADMIN — FAMOTIDINE 20 MILLIGRAM(S): 10 INJECTION INTRAVENOUS at 05:43

## 2020-12-09 RX ADMIN — HEPARIN SODIUM 5000 UNIT(S): 5000 INJECTION INTRAVENOUS; SUBCUTANEOUS at 13:55

## 2020-12-09 RX ADMIN — Medication 975 MILLIGRAM(S): at 10:30

## 2020-12-09 RX ADMIN — HYDROMORPHONE HYDROCHLORIDE 30 MILLILITER(S): 2 INJECTION INTRAMUSCULAR; INTRAVENOUS; SUBCUTANEOUS at 07:15

## 2020-12-09 RX ADMIN — Medication 975 MILLIGRAM(S): at 09:55

## 2020-12-09 RX ADMIN — Medication 975 MILLIGRAM(S): at 13:55

## 2020-12-09 RX ADMIN — OXYCODONE HYDROCHLORIDE 10 MILLIGRAM(S): 5 TABLET ORAL at 10:30

## 2020-12-09 RX ADMIN — OXYCODONE HYDROCHLORIDE 10 MILLIGRAM(S): 5 TABLET ORAL at 09:53

## 2020-12-09 RX ADMIN — HEPARIN SODIUM 5000 UNIT(S): 5000 INJECTION INTRAVENOUS; SUBCUTANEOUS at 05:42

## 2020-12-09 RX ADMIN — Medication 975 MILLIGRAM(S): at 14:20

## 2020-12-09 NOTE — ED PROCEDURE NOTE - NS ED PROCEDURE ASSISTED BY
Initial Anesthesia Post-op Note    Patient: Anthony Ramirez  Procedure(s) Performed: PROSTATECTOMY, ROBOT-ASSISTED, USING DA REINIER XI, WITH LYMPHADENECTOMY  Anesthesia type: General    Vitals Value Taken Time   Temp 36.6 °C (97.8 °F) 12/09/20 1207   Pulse 102 12/09/20 1207   Resp 18 12/09/20 1207   SpO2 100 % 12/09/20 1207   /61 12/09/20 1207         Patient Location: PACU Phase 1  Post-op Vital Signs:stable  Level of Consciousness: participates in exam and awake  Respiratory Status: spontaneous ventilation and unassisted  Cardiovascular blood pressure returned to baseline and stable  Hydration: euvolemic  Pain Management: well controlled  Handoff: Handoff to receiving clinician was performed and questions were answered  Vomiting: none   Nausea: None  Airway Patency:patent  Post-op Assessment: awake, alert, appropriately conversant, or baseline, no complications, patient tolerated procedure well with no complications and evidence of recall  Comments: SV, VSS, no complaints of N/V or pain. Report to RN.      No complications documented.  
The procedure was performed independently

## 2020-12-09 NOTE — PROGRESS NOTE ADULT - SUBJECTIVE AND OBJECTIVE BOX
Anesthesia Pain Management Service    SUBJECTIVE: Patient states her pain is managed well with IV PCA.    Pain Scale Score	At rest: 4/10___ 	With Activity: ___ 	[X ] Refer to charted pain scores    THERAPY:    [ ] IV PCA Morphine		[ ] 5 mg/mL	[ ] 1 mg/mL  [X ] IV PCA Hydromorphone	[ ] 5 mg/mL	[X ] 1 mg/mL  [ ] IV PCA Fentanyl		[ ] 50 micrograms/mL    Demand dose __0.2_ lockout __6_ (minutes) Continuous Rate _0__ Total: ___2.6mg   mg used (in past 24 hrs)      MEDICATIONS  (STANDING):  acetaminophen   Tablet .. 975 milliGRAM(s) Oral every 6 hours  famotidine    Tablet 20 milliGRAM(s) Oral every 12 hours  heparin   Injectable 5000 Unit(s) SubCutaneous every 8 hours  senna 2 Tablet(s) Oral at bedtime    MEDICATIONS  (PRN):  naloxone Injectable 0.1 milliGRAM(s) IV Push every 3 minutes PRN For ANY of the following changes in patient status:  A. RR LESS THAN 10 breaths per minute, B. Oxygen saturation LESS THAN 90%, C. Sedation score of 6  ondansetron Injectable 4 milliGRAM(s) IV Push every 6 hours PRN Nausea  oxyCODONE    IR 5 milliGRAM(s) Oral every 4 hours PRN Moderate Pain (4 - 6)  oxyCODONE    IR 10 milliGRAM(s) Oral every 4 hours PRN Severe Pain (7 - 10)      OBJECTIVE: Patient walking in the room.    Sedation Score:	[ X] Alert	[ ] Drowsy 	[ ] Arousable	[ ] Asleep	[ ] Unresponsive    Side Effects:	[X ] None	[ ] Nausea	[ ] Vomiting	[ ] Pruritus  		[ ] Other:    Vital Signs Last 24 Hrs  T(C): 36.9 (09 Dec 2020 05:39), Max: 37.1 (08 Dec 2020 13:48)  T(F): 98.5 (09 Dec 2020 05:39), Max: 98.8 (08 Dec 2020 13:48)  HR: 72 (09 Dec 2020 05:39) (69 - 96)  BP: 127/87 (09 Dec 2020 05:39) (106/63 - 140/85)  BP(mean): 80 (08 Dec 2020 21:00) (73 - 95)  RR: 18 (09 Dec 2020 05:39) (12 - 20)  SpO2: 92% (09 Dec 2020 05:39) (92% - 100%)    ASSESSMENT/ PLAN    Therapy to  be:	[ ] Continue   [ X] Discontinued   [X ] Change to prn Analgesics    Documentation and Verification of current medications:   [X] Done	[ ] Not done, not elligible    Comments: IV PCA discontinued and PRN Oral/IV opioids and/or Adjuvant non-opioid medication ordered by primary team.  Progress Note written now but Patient was seen earlier.

## 2020-12-09 NOTE — DISCHARGE NOTE NURSING/CASE MANAGEMENT/SOCIAL WORK - PATIENT PORTAL LINK FT
You can access the FollowMyHealth Patient Portal offered by [facility] by registering at the following website: [email or web address]. By joining [facility]’s FollowMyHealth portal, you will also be able to view your health information using other applications (apps) compatible with our system.

## 2020-12-11 ENCOUNTER — NON-APPOINTMENT (OUTPATIENT)
Age: 45
End: 2020-12-11

## 2020-12-18 ENCOUNTER — NON-APPOINTMENT (OUTPATIENT)
Age: 45
End: 2020-12-18

## 2020-12-30 ENCOUNTER — APPOINTMENT (OUTPATIENT)
Dept: THORACIC SURGERY | Facility: CLINIC | Age: 45
End: 2020-12-30
Payer: MEDICAID

## 2020-12-30 VITALS
WEIGHT: 167 LBS | TEMPERATURE: 98 F | HEART RATE: 69 BPM | HEIGHT: 64 IN | BODY MASS INDEX: 28.51 KG/M2 | DIASTOLIC BLOOD PRESSURE: 77 MMHG | SYSTOLIC BLOOD PRESSURE: 118 MMHG | OXYGEN SATURATION: 100 %

## 2020-12-30 PROCEDURE — 99024 POSTOP FOLLOW-UP VISIT: CPT

## 2021-01-14 ENCOUNTER — APPOINTMENT (OUTPATIENT)
Dept: PLASTIC SURGERY | Facility: CLINIC | Age: 46
End: 2021-01-14
Payer: MEDICAID

## 2021-01-14 VITALS
SYSTOLIC BLOOD PRESSURE: 126 MMHG | BODY MASS INDEX: 29.23 KG/M2 | OXYGEN SATURATION: 96 % | TEMPERATURE: 97.7 F | HEIGHT: 63 IN | HEART RATE: 83 BPM | DIASTOLIC BLOOD PRESSURE: 85 MMHG | WEIGHT: 165 LBS

## 2021-01-14 DIAGNOSIS — C50.919 MALIGNANT NEOPLASM OF UNSPECIFIED SITE OF UNSPECIFIED FEMALE BREAST: ICD-10-CM

## 2021-01-14 PROCEDURE — 99072 ADDL SUPL MATRL&STAF TM PHE: CPT

## 2021-01-14 PROCEDURE — 99204 OFFICE O/P NEW MOD 45 MIN: CPT

## 2021-01-27 NOTE — ASSESSMENT
[FreeTextEntry1] : 46 yo female with newly diagnosed breast cancer sent by ** to discuss reconstructive options \par The patient was counseled about reconstructive options following mastectomy, including autologus, prosthetic, and the options of foregoing reconstruction.  Additionally, she was explained the options regarding the timing of reconstruction, including immediate reconstruction at the time of mastectomy or delayed reconstruction at a later date. \par \par The patient was extensively counseled on the operation and the perioperative recoveries of both autologous and prosthetic reconstructions.  The patient understands the risks with abdominally based microsurgical reconstruction including partial or total flap loss, revisit to the operating room in the jessica-operative period, wound dehiscence, mastectomy skin flap necrosis, fat necrosis, abdominal wall morbidity (laxity, bulge, hernia), asymmetry, parasthesia, seroma, hematoma, and infection.  She also understands the risks with implant-based reconstruction including infection, implant malposition, extrusion, deflation, capsular contracture, mastectomy skin flap necrosis, wound dehiscence, asymmetry, seroma, parasthesia, and hematoma. \par \par The patient understands all of these risks and she provides informed consent.\par \par The plan as of now is

## 2021-01-27 NOTE — HISTORY OF PRESENT ILLNESS
[FreeTextEntry1] : Ms. MARIA LUISA TANG  is a 45 year  year old female  with past medical history of HLD, HTN, anxiety who presents with newly diagnosed right breast cancer.  Pt was referred to the office by Dr Ibarra  to discuss her reconstructive options. pt felt a mass in her right breast and has gone through a round of chemo with significant improvement in the size of the mass \par \par smoking status: current smoker\par anticoagulation: on Eliquist \par history of bleeding disorder: history of DVT\par family history of breast cancer: negative \par

## 2021-02-04 ENCOUNTER — OUTPATIENT (OUTPATIENT)
Dept: OUTPATIENT SERVICES | Facility: HOSPITAL | Age: 46
LOS: 1 days | End: 2021-02-04
Payer: MEDICAID

## 2021-02-04 VITALS
SYSTOLIC BLOOD PRESSURE: 116 MMHG | TEMPERATURE: 98 F | OXYGEN SATURATION: 100 % | HEIGHT: 63 IN | HEART RATE: 77 BPM | RESPIRATION RATE: 16 BRPM | DIASTOLIC BLOOD PRESSURE: 73 MMHG | WEIGHT: 160.06 LBS

## 2021-02-04 DIAGNOSIS — R91.8 OTHER NONSPECIFIC ABNORMAL FINDING OF LUNG FIELD: Chronic | ICD-10-CM

## 2021-02-04 DIAGNOSIS — R09.89 OTHER SPECIFIED SYMPTOMS AND SIGNS INVOLVING THE CIRCULATORY AND RESPIRATORY SYSTEMS: ICD-10-CM

## 2021-02-04 DIAGNOSIS — N63.0 UNSPECIFIED LUMP IN UNSPECIFIED BREAST: ICD-10-CM

## 2021-02-04 DIAGNOSIS — N83.209 UNSPECIFIED OVARIAN CYST, UNSPECIFIED SIDE: Chronic | ICD-10-CM

## 2021-02-04 DIAGNOSIS — R94.5 ABNORMAL RESULTS OF LIVER FUNCTION STUDIES: ICD-10-CM

## 2021-02-04 DIAGNOSIS — Z29.9 ENCOUNTER FOR PROPHYLACTIC MEASURES, UNSPECIFIED: ICD-10-CM

## 2021-02-04 DIAGNOSIS — I82.621 ACUTE EMBOLISM AND THROMBOSIS OF DEEP VEINS OF RIGHT UPPER EXTREMITY: ICD-10-CM

## 2021-02-04 DIAGNOSIS — Z01.818 ENCOUNTER FOR OTHER PREPROCEDURAL EXAMINATION: ICD-10-CM

## 2021-02-04 DIAGNOSIS — Z90.721 ACQUIRED ABSENCE OF OVARIES, UNILATERAL: Chronic | ICD-10-CM

## 2021-02-04 DIAGNOSIS — Z98.890 OTHER SPECIFIED POSTPROCEDURAL STATES: Chronic | ICD-10-CM

## 2021-02-04 DIAGNOSIS — R93.89 ABNORMAL FINDINGS ON DIAGNOSTIC IMAGING OF OTHER SPECIFIED BODY STRUCTURES: ICD-10-CM

## 2021-02-04 DIAGNOSIS — Z98.51 TUBAL LIGATION STATUS: Chronic | ICD-10-CM

## 2021-02-04 DIAGNOSIS — I10 ESSENTIAL (PRIMARY) HYPERTENSION: ICD-10-CM

## 2021-02-04 DIAGNOSIS — I82.409 ACUTE EMBOLISM AND THROMBOSIS OF UNSPECIFIED DEEP VEINS OF UNSPECIFIED LOWER EXTREMITY: ICD-10-CM

## 2021-02-04 DIAGNOSIS — Z98.891 HISTORY OF UTERINE SCAR FROM PREVIOUS SURGERY: Chronic | ICD-10-CM

## 2021-02-04 LAB
ALBUMIN SERPL ELPH-MCNC: 3.6 G/DL — SIGNIFICANT CHANGE UP (ref 3.5–5)
ALP SERPL-CCNC: 311 U/L — HIGH (ref 40–120)
ALT FLD-CCNC: 1511 U/L DA — HIGH (ref 10–60)
ANION GAP SERPL CALC-SCNC: 6 MMOL/L — SIGNIFICANT CHANGE UP (ref 5–17)
AST SERPL-CCNC: 740 U/L — HIGH (ref 10–40)
BILIRUB SERPL-MCNC: 0.6 MG/DL — SIGNIFICANT CHANGE UP (ref 0.2–1.2)
BLD GP AB SCN SERPL QL: SIGNIFICANT CHANGE UP
BUN SERPL-MCNC: 11 MG/DL — SIGNIFICANT CHANGE UP (ref 7–18)
CALCIUM SERPL-MCNC: 9.5 MG/DL — SIGNIFICANT CHANGE UP (ref 8.4–10.5)
CHLORIDE SERPL-SCNC: 105 MMOL/L — SIGNIFICANT CHANGE UP (ref 96–108)
CO2 SERPL-SCNC: 29 MMOL/L — SIGNIFICANT CHANGE UP (ref 22–31)
CREAT SERPL-MCNC: 0.59 MG/DL — SIGNIFICANT CHANGE UP (ref 0.5–1.3)
GLUCOSE SERPL-MCNC: 89 MG/DL — SIGNIFICANT CHANGE UP (ref 70–99)
POTASSIUM SERPL-MCNC: 3.5 MMOL/L — SIGNIFICANT CHANGE UP (ref 3.5–5.3)
POTASSIUM SERPL-SCNC: 3.5 MMOL/L — SIGNIFICANT CHANGE UP (ref 3.5–5.3)
PROT SERPL-MCNC: 8.3 G/DL — SIGNIFICANT CHANGE UP (ref 6–8.3)
SODIUM SERPL-SCNC: 140 MMOL/L — SIGNIFICANT CHANGE UP (ref 135–145)

## 2021-02-04 PROCEDURE — G0463: CPT

## 2021-02-04 RX ORDER — CHLORHEXIDINE GLUCONATE 213 G/1000ML
1 SOLUTION TOPICAL ONCE
Refills: 0 | Status: COMPLETED | OUTPATIENT
Start: 2021-02-17 | End: 2021-02-17

## 2021-02-04 RX ORDER — SODIUM CHLORIDE 9 MG/ML
3 INJECTION INTRAMUSCULAR; INTRAVENOUS; SUBCUTANEOUS EVERY 8 HOURS
Refills: 0 | Status: DISCONTINUED | OUTPATIENT
Start: 2021-02-17 | End: 2021-02-17

## 2021-02-04 NOTE — H&P PST ADULT - MUSCULOSKELETAL
normal strength/ROM intact/no calf tenderness details… detailed exam normal strength/ROM intact/no joint erythema/no joint warmth/no calf tenderness

## 2021-02-04 NOTE — H&P PST ADULT - NSICDXPASTSURGICALHX_GEN_ALL_CORE_FT
PAST SURGICAL HISTORY:  H/O tubal ligation     History of vascular access device 2020- left chest  bardport    Mass of right lung right video assisted thoracoscopy robotic assisted right lower lobe wedge resection 2020    Ovarian cyst removed in 2008    S/P biopsy lung 2020    S/P  section     S/P right oophorectomy 2015

## 2021-02-04 NOTE — H&P PST ADULT - NEGATIVE BREAST SYMPTOMS
no breast tenderness L/no breast tenderness R no breast tenderness L/no breast tenderness R/no breast lump L/no nipple discharge L/no nipple discharge R

## 2021-02-04 NOTE — H&P PST ADULT - RS GEN PE MLT RESP DETAILS PC
respirations non-labored/no rales/no rhonchi/no wheezes respirations non-labored/clear to auscultation bilaterally/no rales/no rhonchi/no wheezes

## 2021-02-04 NOTE — H&P PST ADULT - NSICDXPROBLEM_GEN_ALL_CORE_FT
PROBLEM DIAGNOSES  Problem: Liver function test abnormality  Assessment and Plan: 1/28/2021- Alkaline phosphatase -230, AST- 550, , ALT- 1105- pt denies GI symptoms-nausea, vomiting, abdominalpain, denies tenderness to palpation - pt to see PCP for medical clearance- hepatic function repeated today, labs results increased - labs results given to pt , pt to see PCP for evaluation     Problem: Abnormal finding on chest xray  Assessment and Plan: Surgical clips on chest xray and mild tending of the right hemidiaphragm , these appear new and may represent postsurgical changes related to removal previously documented nodule right basilar region - correlate with interval hx and f/u CT if warranted. Pt to have Pulmonology evaluation before sx and medical clearance.     Problem: Hypertension  Assessment and Plan: Continue antihypertensive meds and take with sips of water on day of surgery. Follow-up with PCP postop for management. Possible cardiac clearance if needed,       Problem: Unspecified lump in unspecified breast  Assessment and Plan: Patientis scheduled for  right breast total mastectomy with preop needle localization and right breast sentinel node bx, left breast prophylactic total mastectomy with left breast preop needle localization on 2/17/2021.     Problem: DVT (deep venous thrombosis)  Assessment and Plan: left internal jugular vein ( 8/2020) pt on Eliqis , as per pt to be bridged to Lovenox per Hematology the same way as before lung surgery in December 2020-to notify OR booking and Surgeon - pt to have Hematology evaluation before surgery    Problem: Prophylactic measure  Assessment and Plan: Preoperative instructions discussed with pt and given to pt. Instructed pt that no one will be allowed to come to hospital with patient , to notify security on arrival to the lobby of the [facility] that today is day of surgery. Patient notified that will need someone to come to the hospital to  after surgery, not to eat or drink anything after midnight the night before the surgery, to avoid NSAIDs such as Ibuprofen, Motrin, Aleve, Advil, naproxen before surgery, to take Tylenol if needed for pain, to report exposure to anyone with any contagious diseases including Covid-19 or if patient is exhibiting any symptoms of COVID-19. Chlorhexidine 4% soap given to pt. Instructed about use of Chlorhexidine 4% soap before surgery. Patient verbalized understanding of instructions given.

## 2021-02-04 NOTE — H&P PST ADULT - GASTROINTESTINAL DETAILS
soft/nontender/no distention/bowel sounds normal soft/nontender/no distention/no masses palpable/bowel sounds normal

## 2021-02-04 NOTE — H&P PST ADULT - RESPIRATORY AND THORAX COMMENTS
right lung nodule hx of right lung nodule, s/p left lung bx and s/p robotic assisted right VATS right lower lobe wedge- 12/2020

## 2021-02-04 NOTE — H&P PST ADULT - ASSESSMENT
44 yo female with hx of HTN, breast CA (dx in 7/2020) s/p chemo (completed in 10/2020) presents with unspecified lump in right breast .

## 2021-02-04 NOTE — H&P PST ADULT - NEGATIVE OPHTHALMOLOGIC SYMPTOMS
no diplopia/no photophobia/no blurred vision L/no blurred vision R no diplopia/no photophobia/no lacrimation L/no lacrimation R/no blurred vision L/no blurred vision R

## 2021-02-04 NOTE — H&P PST ADULT - NEGATIVE PSYCHIATRIC SYMPTOMS
no suicidal ideation/no depression/no anxiety no suicidal ideation/no depression/no anxiety/no insomnia

## 2021-02-04 NOTE — H&P PST ADULT - SKIN/BREAST COMMENTS
dx right breast CA in 6/2020 s/p chemo 10/2020 dx right breast CA in 7/2020 s/p chemo 10/2020 via left chest bardport, hx of right groin abscess I and D - 2000

## 2021-02-04 NOTE — H&P PST ADULT - NEGATIVE NEUROLOGICAL SYMPTOMS
no weakness/no paresthesias/no generalized seizures/no syncope/no tremors/no vertigo/no difficulty walking/no headache no transient paralysis/no weakness/no paresthesias/no generalized seizures/no syncope/no tremors/no vertigo/no difficulty walking/no headache

## 2021-02-04 NOTE — H&P PST ADULT - DOES PATIENT HAVE ADVANCE DIRECTIVE
to make medical decisions imn case of emergency/No  to make medical decisions in case of emergency/No

## 2021-02-04 NOTE — H&P PST ADULT - NECK DETAILS
no palpable thyroid nodule / ROM intact/supple no palpable thyroid nodule / ROM intact/supple/no JVD/normal thyroid gland

## 2021-02-04 NOTE — H&P PST ADULT - HISTORY OF PRESENT ILLNESS
46 yo female with hx of HTN, breast CA (dx in 7/2020) s/p chemo (completed in 10/2020) presents to have PST evaluation for right video assisted thoracoscopy robotic assisted right lower lobe wedge resection. Patient reports, c/o left neck swelling and pain, presented to ER, had CT scan of neck/chest done, which revealed "nodule in right lung", s/p biopsy- inconclusive, Patient was referred to Dr. Davis for further evaluation, surgical intervention was recommended. Patient denies CP, dyspnea, cough. 46 yo female with hx of HTN, breast CA (dx in 7/2020) s/p chemo (completed in 10/2020) presents to have PST evaluation before surgery, pt was diagnosed with unspecified lump in right breast and is scheduled for  right breast total mastectomy with preop needle localization and right breast sentinel node bx, left breast prophylactic total mastectomy with left breast preop needle localization on 2/17/2021.

## 2021-02-04 NOTE — H&P PST ADULT - NEGATIVE MUSCULOSKELETAL SYMPTOMS
no arthritis/no stiffness/no neck pain/no back pain no arthralgia/no arthritis/no stiffness/no neck pain/no back pain

## 2021-02-04 NOTE — H&P PST ADULT - PRIMARY CARE PROVIDER
Dr. Brendon Khan (pcp) [phone], Dr. Davis , Pulmonology, Dr. Jena Berger  [facility]

## 2021-02-04 NOTE — H&P PST ADULT - NSICDXPASTMEDICALHX_GEN_ALL_CORE_FT
PAST MEDICAL HISTORY:  Breast cancer dx in 6/2020, s/p chemo (completed in 10/2020)    Deep vein thrombosis (DVT) left  internal jugular vein (dx 8/2020 on Eliquis)    History of drainage of abscess right groin 2020 - I and D    HLD (hyperlipidemia) controlled with diet    Hypertension     Mass of right lung hx - sx done    S/P chemotherapy, time since greater than 12 weeks for right breast mass via left chest bardport- ended 10/2020

## 2021-02-04 NOTE — H&P PST ADULT - VTE RISK COMMENTS
pt on Eliquis/ to bridge via EstadebodanoJ C Lads pt on Eliquis/ to bridge to  Lovenox before surgery

## 2021-02-05 LAB
MRSA PCR RESULT.: SIGNIFICANT CHANGE UP
S AUREUS DNA NOSE QL NAA+PROBE: SIGNIFICANT CHANGE UP

## 2021-02-14 ENCOUNTER — APPOINTMENT (OUTPATIENT)
Dept: DISASTER EMERGENCY | Facility: CLINIC | Age: 46
End: 2021-02-14

## 2021-02-14 DIAGNOSIS — Z01.818 ENCOUNTER FOR OTHER PREPROCEDURAL EXAMINATION: ICD-10-CM

## 2021-02-14 LAB — SARS-COV-2 N GENE NPH QL NAA+PROBE: NOT DETECTED

## 2021-02-16 ENCOUNTER — TRANSCRIPTION ENCOUNTER (OUTPATIENT)
Age: 46
End: 2021-02-16

## 2021-02-17 ENCOUNTER — RESULT REVIEW (OUTPATIENT)
Age: 46
End: 2021-02-17

## 2021-02-17 ENCOUNTER — INPATIENT (INPATIENT)
Facility: HOSPITAL | Age: 46
LOS: 4 days | DRG: 579 | End: 2021-02-22
Attending: INTERNAL MEDICINE | Admitting: INTERNAL MEDICINE
Payer: MEDICAID

## 2021-02-17 VITALS
RESPIRATION RATE: 16 BRPM | OXYGEN SATURATION: 100 % | SYSTOLIC BLOOD PRESSURE: 102 MMHG | DIASTOLIC BLOOD PRESSURE: 62 MMHG | HEIGHT: 65 IN | TEMPERATURE: 98 F | WEIGHT: 160.06 LBS | HEART RATE: 83 BPM

## 2021-02-17 DIAGNOSIS — Z90.721 ACQUIRED ABSENCE OF OVARIES, UNILATERAL: Chronic | ICD-10-CM

## 2021-02-17 DIAGNOSIS — N63.0 UNSPECIFIED LUMP IN UNSPECIFIED BREAST: ICD-10-CM

## 2021-02-17 DIAGNOSIS — Z98.890 OTHER SPECIFIED POSTPROCEDURAL STATES: Chronic | ICD-10-CM

## 2021-02-17 DIAGNOSIS — Z98.891 HISTORY OF UTERINE SCAR FROM PREVIOUS SURGERY: Chronic | ICD-10-CM

## 2021-02-17 DIAGNOSIS — Z01.818 ENCOUNTER FOR OTHER PREPROCEDURAL EXAMINATION: ICD-10-CM

## 2021-02-17 DIAGNOSIS — R91.8 OTHER NONSPECIFIC ABNORMAL FINDING OF LUNG FIELD: Chronic | ICD-10-CM

## 2021-02-17 DIAGNOSIS — N83.209 UNSPECIFIED OVARIAN CYST, UNSPECIFIED SIDE: Chronic | ICD-10-CM

## 2021-02-17 DIAGNOSIS — Z98.51 TUBAL LIGATION STATUS: Chronic | ICD-10-CM

## 2021-02-17 LAB
APTT BLD: 50.8 SEC — HIGH (ref 27.5–35.5)
BLD GP AB SCN SERPL QL: SIGNIFICANT CHANGE UP
HCG UR QL: NEGATIVE — SIGNIFICANT CHANGE UP
INR BLD: 1.11 RATIO — SIGNIFICANT CHANGE UP (ref 0.88–1.16)
PROTHROM AB SERPL-ACNC: 13.1 SEC — SIGNIFICANT CHANGE UP (ref 10.6–13.6)

## 2021-02-17 PROCEDURE — 88341 IMHCHEM/IMCYTCHM EA ADD ANTB: CPT | Mod: 26

## 2021-02-17 PROCEDURE — 88331 PATH CONSLTJ SURG 1 BLK 1SPC: CPT | Mod: 26

## 2021-02-17 PROCEDURE — 88342 IMHCHEM/IMCYTCHM 1ST ANTB: CPT | Mod: 26

## 2021-02-17 PROCEDURE — 88307 TISSUE EXAM BY PATHOLOGIST: CPT | Mod: 26

## 2021-02-17 PROCEDURE — 88334 PATH CONSLTJ SURG CYTO XM EA: CPT | Mod: 26,59

## 2021-02-17 PROCEDURE — 78195 LYMPH SYSTEM IMAGING: CPT | Mod: 26

## 2021-02-17 RX ORDER — OXYCODONE HYDROCHLORIDE 5 MG/1
5 TABLET ORAL EVERY 6 HOURS
Refills: 0 | Status: DISCONTINUED | OUTPATIENT
Start: 2021-02-17 | End: 2021-02-18

## 2021-02-17 RX ORDER — AMLODIPINE BESYLATE 2.5 MG/1
5 TABLET ORAL DAILY
Refills: 0 | Status: DISCONTINUED | OUTPATIENT
Start: 2021-02-17 | End: 2021-02-17

## 2021-02-17 RX ORDER — HYDROMORPHONE HYDROCHLORIDE 2 MG/ML
1 INJECTION INTRAMUSCULAR; INTRAVENOUS; SUBCUTANEOUS
Refills: 0 | Status: DISCONTINUED | OUTPATIENT
Start: 2021-02-17 | End: 2021-02-17

## 2021-02-17 RX ORDER — ONDANSETRON 8 MG/1
4 TABLET, FILM COATED ORAL EVERY 8 HOURS
Refills: 0 | Status: DISCONTINUED | OUTPATIENT
Start: 2021-02-17 | End: 2021-02-18

## 2021-02-17 RX ORDER — AMLODIPINE BESYLATE 2.5 MG/1
5 TABLET ORAL DAILY
Refills: 0 | Status: DISCONTINUED | OUTPATIENT
Start: 2021-02-17 | End: 2021-02-18

## 2021-02-17 RX ORDER — SODIUM CHLORIDE 9 MG/ML
1000 INJECTION, SOLUTION INTRAVENOUS
Refills: 0 | Status: DISCONTINUED | OUTPATIENT
Start: 2021-02-17 | End: 2021-02-17

## 2021-02-17 RX ORDER — ONDANSETRON 8 MG/1
4 TABLET, FILM COATED ORAL ONCE
Refills: 0 | Status: COMPLETED | OUTPATIENT
Start: 2021-02-17 | End: 2021-02-17

## 2021-02-17 RX ORDER — ACETAMINOPHEN 500 MG
650 TABLET ORAL EVERY 6 HOURS
Refills: 0 | Status: DISCONTINUED | OUTPATIENT
Start: 2021-02-17 | End: 2021-02-18

## 2021-02-17 RX ORDER — ENOXAPARIN SODIUM 100 MG/ML
80 INJECTION SUBCUTANEOUS EVERY 12 HOURS
Refills: 0 | Status: DISCONTINUED | OUTPATIENT
Start: 2021-02-17 | End: 2021-02-18

## 2021-02-17 RX ORDER — HYDROMORPHONE HYDROCHLORIDE 2 MG/ML
0.5 INJECTION INTRAMUSCULAR; INTRAVENOUS; SUBCUTANEOUS
Refills: 0 | Status: DISCONTINUED | OUTPATIENT
Start: 2021-02-17 | End: 2021-02-17

## 2021-02-17 RX ADMIN — ENOXAPARIN SODIUM 80 MILLIGRAM(S): 100 INJECTION SUBCUTANEOUS at 18:37

## 2021-02-17 RX ADMIN — CHLORHEXIDINE GLUCONATE 1 APPLICATION(S): 213 SOLUTION TOPICAL at 07:34

## 2021-02-17 RX ADMIN — ONDANSETRON 4 MILLIGRAM(S): 8 TABLET, FILM COATED ORAL at 14:37

## 2021-02-17 RX ADMIN — OXYCODONE HYDROCHLORIDE 5 MILLIGRAM(S): 5 TABLET ORAL at 22:57

## 2021-02-17 RX ADMIN — SODIUM CHLORIDE 3 MILLILITER(S): 9 INJECTION INTRAMUSCULAR; INTRAVENOUS; SUBCUTANEOUS at 07:34

## 2021-02-17 RX ADMIN — OXYCODONE HYDROCHLORIDE 5 MILLIGRAM(S): 5 TABLET ORAL at 15:52

## 2021-02-17 NOTE — ASU PATIENT PROFILE, ADULT - PMH
Breast cancer  dx in 6/2020, s/p chemo (completed in 10/2020)  Deep vein thrombosis (DVT)  left  internal jugular vein (dx 8/2020 on Eliquis)  History of drainage of abscess  right groin 2020 - I and D  HLD (hyperlipidemia)  controlled with diet  Hypertension    Mass of right lung  hx - sx done  S/P chemotherapy, time since greater than 12 weeks  for right breast mass via left chest bardport- ended 10/2020

## 2021-02-17 NOTE — PROGRESS NOTE ADULT - SUBJECTIVE AND OBJECTIVE BOX
Post op check:     Patient seen and examined at the bedside. Doing well postoperatively. Pain well controlled, mildly nauseous. Patient started on lovenox 80 BID for therapeutic coverage of previous DVTs.     Vital Signs Last 24 Hrs  T(C): 36.6 (17 Feb 2021 14:20), Max: 36.9 (17 Feb 2021 07:25)  T(F): 97.8 (17 Feb 2021 14:20), Max: 98.4 (17 Feb 2021 07:25)  HR: 89 (17 Feb 2021 14:20) (83 - 96)  BP: 114/71 (17 Feb 2021 14:20) (102/62 - 129/74)  BP(mean): 83 (17 Feb 2021 13:17) (80 - 90)  RR: 18 (17 Feb 2021 14:20) (12 - 20)  SpO2: 96% (17 Feb 2021 14:20) (96% - 100%)    Physical exam:   Gen: NAD, AAOx3  Resp: normal respiratory effort  Breast: bra with fluff in place, 4 drains with minimal SS output     A+P:   45 F s/p b/l total mastectomy and right sentinel lymph node biopsy:   - Drains to suction, monitor output  - Pain control   - VItals  - Therapeutic lovenox, eliquis to resume 2/19  - Regular diet  - ambulation as tolerated

## 2021-02-17 NOTE — ASU PATIENT PROFILE, ADULT - PSH
H/O tubal ligation    History of vascular access device  2020- left chest  bardport  Mass of right lung  right video assisted thoracoscopy robotic assisted right lower lobe wedge resection 2020  Ovarian cyst  removed in 2008  S/P biopsy  lung 2020  S/P  section    S/P right oophorectomy  2015

## 2021-02-17 NOTE — ASU PREOP CHECKLIST - SELECT TESTS ORDERED
COVID/UCG, T&S, PT/PTT sent stat preop as ordered by NP (drawn by LEV Narayanan)/BMP/CBC/PT/PTT/INR/Type and Screen/UCG/EKG/CXR/Results in MD note

## 2021-02-18 ENCOUNTER — TRANSCRIPTION ENCOUNTER (OUTPATIENT)
Age: 46
End: 2021-02-18

## 2021-02-18 LAB
ACANTHOCYTES BLD QL SMEAR: SLIGHT — SIGNIFICANT CHANGE UP
ALBUMIN SERPL ELPH-MCNC: 1 G/DL — LOW (ref 3.5–5)
ALBUMIN SERPL ELPH-MCNC: 1.2 G/DL — LOW (ref 3.5–5)
ALBUMIN SERPL ELPH-MCNC: 2.3 G/DL — LOW (ref 3.5–5)
ALP SERPL-CCNC: 141 U/L — HIGH (ref 40–120)
ALP SERPL-CCNC: 175 U/L — HIGH (ref 40–120)
ALP SERPL-CCNC: 95 U/L — SIGNIFICANT CHANGE UP (ref 40–120)
ALT FLD-CCNC: 585 U/L DA — HIGH (ref 10–60)
ALT FLD-CCNC: 738 U/L DA — HIGH (ref 10–60)
ALT FLD-CCNC: 940 U/L DA — HIGH (ref 10–60)
ANION GAP SERPL CALC-SCNC: 16 MMOL/L — SIGNIFICANT CHANGE UP (ref 5–17)
ANION GAP SERPL CALC-SCNC: 23 MMOL/L — HIGH (ref 5–17)
ANION GAP SERPL CALC-SCNC: 27 MMOL/L — HIGH (ref 5–17)
ANISOCYTOSIS BLD QL: SLIGHT — SIGNIFICANT CHANGE UP
APTT BLD: 30.8 SEC — SIGNIFICANT CHANGE UP (ref 27.5–35.5)
APTT BLD: 81.5 SEC — HIGH (ref 27.5–35.5)
AST SERPL-CCNC: 1070 U/L — HIGH (ref 10–40)
AST SERPL-CCNC: 1729 U/L — HIGH (ref 10–40)
AST SERPL-CCNC: 339 U/L — HIGH (ref 10–40)
BASE EXCESS BLDA CALC-SCNC: -21.7 MMOL/L — LOW (ref -2–2)
BASE EXCESS BLDV CALC-SCNC: -25.6 MMOL/L — LOW (ref -2–2)
BASOPHILS # BLD AUTO: 0 K/UL — SIGNIFICANT CHANGE UP (ref 0–0.2)
BASOPHILS NFR BLD AUTO: 0 % — SIGNIFICANT CHANGE UP (ref 0–2)
BILIRUB SERPL-MCNC: 0.3 MG/DL — SIGNIFICANT CHANGE UP (ref 0.2–1.2)
BILIRUB SERPL-MCNC: 0.4 MG/DL — SIGNIFICANT CHANGE UP (ref 0.2–1.2)
BILIRUB SERPL-MCNC: 0.6 MG/DL — SIGNIFICANT CHANGE UP (ref 0.2–1.2)
BLOOD GAS COMMENTS ARTERIAL: SIGNIFICANT CHANGE UP
BUN SERPL-MCNC: 14 MG/DL — SIGNIFICANT CHANGE UP (ref 7–18)
BUN SERPL-MCNC: 21 MG/DL — HIGH (ref 7–18)
BUN SERPL-MCNC: 21 MG/DL — HIGH (ref 7–18)
BURR CELLS BLD QL SMEAR: SLIGHT — SIGNIFICANT CHANGE UP
CALCIUM SERPL-MCNC: 7.2 MG/DL — LOW (ref 8.4–10.5)
CALCIUM SERPL-MCNC: 7.8 MG/DL — LOW (ref 8.4–10.5)
CALCIUM SERPL-MCNC: 7.9 MG/DL — LOW (ref 8.4–10.5)
CHLORIDE SERPL-SCNC: 104 MMOL/L — SIGNIFICANT CHANGE UP (ref 96–108)
CHLORIDE SERPL-SCNC: 104 MMOL/L — SIGNIFICANT CHANGE UP (ref 96–108)
CHLORIDE SERPL-SCNC: 98 MMOL/L — SIGNIFICANT CHANGE UP (ref 96–108)
CO2 SERPL-SCNC: 11 MMOL/L — LOW (ref 22–31)
CO2 SERPL-SCNC: 12 MMOL/L — LOW (ref 22–31)
CO2 SERPL-SCNC: 18 MMOL/L — LOW (ref 22–31)
CREAT SERPL-MCNC: 1.02 MG/DL — SIGNIFICANT CHANGE UP (ref 0.5–1.3)
CREAT SERPL-MCNC: 1.74 MG/DL — HIGH (ref 0.5–1.3)
CREAT SERPL-MCNC: 1.83 MG/DL — HIGH (ref 0.5–1.3)
D DIMER BLD IA.RAPID-MCNC: 157 NG/ML DDU — SIGNIFICANT CHANGE UP
D DIMER BLD IA.RAPID-MCNC: 347 NG/ML DDU — HIGH
ELLIPTOCYTES BLD QL SMEAR: SLIGHT — SIGNIFICANT CHANGE UP
EOSINOPHIL # BLD AUTO: 0 K/UL — SIGNIFICANT CHANGE UP (ref 0–0.5)
EOSINOPHIL NFR BLD AUTO: 0 % — SIGNIFICANT CHANGE UP (ref 0–6)
GLUCOSE BLDC GLUCOMTR-MCNC: 165 MG/DL — HIGH (ref 70–99)
GLUCOSE BLDC GLUCOMTR-MCNC: 325 MG/DL — HIGH (ref 70–99)
GLUCOSE BLDC GLUCOMTR-MCNC: 360 MG/DL — HIGH (ref 70–99)
GLUCOSE SERPL-MCNC: 229 MG/DL — HIGH (ref 70–99)
GLUCOSE SERPL-MCNC: 333 MG/DL — HIGH (ref 70–99)
GLUCOSE SERPL-MCNC: 437 MG/DL — HIGH (ref 70–99)
HCO3 BLDA-SCNC: 8 MMOL/L — LOW (ref 23–27)
HCO3 BLDV-SCNC: 7 MMOL/L — LOW (ref 21–29)
HCT VFR BLD CALC: 16.8 % — CRITICAL LOW (ref 34.5–45)
HCT VFR BLD CALC: 21.4 % — LOW (ref 34.5–45)
HCT VFR BLD CALC: 23.7 % — LOW (ref 34.5–45)
HCT VFR BLD CALC: 23.9 % — LOW (ref 34.5–45)
HCT VFR BLD CALC: 24.6 % — LOW (ref 34.5–45)
HCT VFR BLD CALC: 27.6 % — LOW (ref 34.5–45)
HGB BLD-MCNC: 5.1 G/DL — CRITICAL LOW (ref 11.5–15.5)
HGB BLD-MCNC: 6.6 G/DL — CRITICAL LOW (ref 11.5–15.5)
HGB BLD-MCNC: 7.4 G/DL — LOW (ref 11.5–15.5)
HGB BLD-MCNC: 8 G/DL — LOW (ref 11.5–15.5)
HGB BLD-MCNC: 8 G/DL — LOW (ref 11.5–15.5)
HGB BLD-MCNC: 9.1 G/DL — LOW (ref 11.5–15.5)
HOROWITZ INDEX BLDA+IHG-RTO: 100 — SIGNIFICANT CHANGE UP
HYPOCHROMIA BLD QL: SLIGHT — SIGNIFICANT CHANGE UP
INR BLD: 1.41 RATIO — HIGH (ref 0.88–1.16)
INR BLD: 2.07 RATIO — HIGH (ref 0.88–1.16)
LACTATE SERPL-SCNC: 22.6 MMOL/L — CRITICAL HIGH (ref 0.7–2)
LYMPHOCYTES # BLD AUTO: 3.39 K/UL — HIGH (ref 1–3.3)
LYMPHOCYTES # BLD AUTO: 33 % — SIGNIFICANT CHANGE UP (ref 13–44)
MAGNESIUM SERPL-MCNC: 1.5 MG/DL — LOW (ref 1.6–2.6)
MAGNESIUM SERPL-MCNC: 1.9 MG/DL — SIGNIFICANT CHANGE UP (ref 1.6–2.6)
MANUAL SMEAR VERIFICATION: SIGNIFICANT CHANGE UP
MCHC RBC-ENTMCNC: 27.6 PG — SIGNIFICANT CHANGE UP (ref 27–34)
MCHC RBC-ENTMCNC: 28.9 PG — SIGNIFICANT CHANGE UP (ref 27–34)
MCHC RBC-ENTMCNC: 29 PG — SIGNIFICANT CHANGE UP (ref 27–34)
MCHC RBC-ENTMCNC: 29.5 PG — SIGNIFICANT CHANGE UP (ref 27–34)
MCHC RBC-ENTMCNC: 29.5 PG — SIGNIFICANT CHANGE UP (ref 27–34)
MCHC RBC-ENTMCNC: 29.6 PG — SIGNIFICANT CHANGE UP (ref 27–34)
MCHC RBC-ENTMCNC: 30.4 GM/DL — LOW (ref 32–36)
MCHC RBC-ENTMCNC: 30.8 GM/DL — LOW (ref 32–36)
MCHC RBC-ENTMCNC: 31.2 GM/DL — LOW (ref 32–36)
MCHC RBC-ENTMCNC: 32.5 GM/DL — SIGNIFICANT CHANGE UP (ref 32–36)
MCHC RBC-ENTMCNC: 33 GM/DL — SIGNIFICANT CHANGE UP (ref 32–36)
MCHC RBC-ENTMCNC: 33.5 GM/DL — SIGNIFICANT CHANGE UP (ref 32–36)
MCV RBC AUTO: 84.8 FL — SIGNIFICANT CHANGE UP (ref 80–100)
MCV RBC AUTO: 86.6 FL — SIGNIFICANT CHANGE UP (ref 80–100)
MCV RBC AUTO: 89.6 FL — SIGNIFICANT CHANGE UP (ref 80–100)
MCV RBC AUTO: 93.9 FL — SIGNIFICANT CHANGE UP (ref 80–100)
MCV RBC AUTO: 94.8 FL — SIGNIFICANT CHANGE UP (ref 80–100)
MCV RBC AUTO: 97.1 FL — SIGNIFICANT CHANGE UP (ref 80–100)
MICROCYTES BLD QL: SLIGHT — SIGNIFICANT CHANGE UP
MONOCYTES # BLD AUTO: 0.41 K/UL — SIGNIFICANT CHANGE UP (ref 0–0.9)
MONOCYTES NFR BLD AUTO: 4 % — SIGNIFICANT CHANGE UP (ref 2–14)
NEUTROPHILS # BLD AUTO: 6.48 K/UL — SIGNIFICANT CHANGE UP (ref 1.8–7.4)
NEUTROPHILS NFR BLD AUTO: 63 % — SIGNIFICANT CHANGE UP (ref 43–77)
NRBC # BLD: 0 /100 WBCS — SIGNIFICANT CHANGE UP (ref 0–0)
NRBC # BLD: 0 /100 — SIGNIFICANT CHANGE UP (ref 0–0)
PCO2 BLDA: 39 MMHG — SIGNIFICANT CHANGE UP (ref 32–46)
PCO2 BLDV: 62 MMHG — HIGH (ref 35–50)
PH BLDA: 6.94 — CRITICAL LOW (ref 7.35–7.45)
PH BLDV: 6.68 — CRITICAL LOW (ref 7.35–7.45)
PHOSPHATE SERPL-MCNC: 13.2 MG/DL — HIGH (ref 2.5–4.5)
PHOSPHATE SERPL-MCNC: 6.4 MG/DL — HIGH (ref 2.5–4.5)
PLAT MORPH BLD: NORMAL — SIGNIFICANT CHANGE UP
PLATELET # BLD AUTO: 129 K/UL — LOW (ref 150–400)
PLATELET # BLD AUTO: 137 K/UL — LOW (ref 150–400)
PLATELET # BLD AUTO: 151 K/UL — SIGNIFICANT CHANGE UP (ref 150–400)
PLATELET # BLD AUTO: 190 K/UL — SIGNIFICANT CHANGE UP (ref 150–400)
PLATELET # BLD AUTO: 76 K/UL — LOW (ref 150–400)
PLATELET # BLD AUTO: 98 K/UL — LOW (ref 150–400)
PO2 BLDA: 566 MMHG — HIGH (ref 74–108)
PO2 BLDV: 67 MMHG — HIGH (ref 25–45)
POIKILOCYTOSIS BLD QL AUTO: SLIGHT — SIGNIFICANT CHANGE UP
POLYCHROMASIA BLD QL SMEAR: SLIGHT — SIGNIFICANT CHANGE UP
POTASSIUM SERPL-MCNC: 3.4 MMOL/L — LOW (ref 3.5–5.3)
POTASSIUM SERPL-MCNC: 5.3 MMOL/L — SIGNIFICANT CHANGE UP (ref 3.5–5.3)
POTASSIUM SERPL-MCNC: 6 MMOL/L — HIGH (ref 3.5–5.3)
POTASSIUM SERPL-SCNC: 3.4 MMOL/L — LOW (ref 3.5–5.3)
POTASSIUM SERPL-SCNC: 5.3 MMOL/L — SIGNIFICANT CHANGE UP (ref 3.5–5.3)
POTASSIUM SERPL-SCNC: 6 MMOL/L — HIGH (ref 3.5–5.3)
PROT SERPL-MCNC: 2.5 G/DL — LOW (ref 6–8.3)
PROT SERPL-MCNC: 2.9 G/DL — LOW (ref 6–8.3)
PROT SERPL-MCNC: 5.8 G/DL — LOW (ref 6–8.3)
PROTHROM AB SERPL-ACNC: 16.5 SEC — HIGH (ref 10.6–13.6)
PROTHROM AB SERPL-ACNC: 23.8 SEC — HIGH (ref 10.6–13.6)
RBC # BLD: 1.73 M/UL — LOW (ref 3.8–5.2)
RBC # BLD: 2.28 M/UL — LOW (ref 3.8–5.2)
RBC # BLD: 2.5 M/UL — LOW (ref 3.8–5.2)
RBC # BLD: 2.76 M/UL — LOW (ref 3.8–5.2)
RBC # BLD: 2.9 M/UL — LOW (ref 3.8–5.2)
RBC # BLD: 3.08 M/UL — LOW (ref 3.8–5.2)
RBC # FLD: 13.6 % — SIGNIFICANT CHANGE UP (ref 10.3–14.5)
RBC # FLD: 14.8 % — HIGH (ref 10.3–14.5)
RBC # FLD: 15.6 % — HIGH (ref 10.3–14.5)
RBC # FLD: 15.8 % — HIGH (ref 10.3–14.5)
RBC # FLD: 16.6 % — HIGH (ref 10.3–14.5)
RBC # FLD: 17 % — HIGH (ref 10.3–14.5)
RBC BLD AUTO: ABNORMAL
SAO2 % BLDA: 99 % — HIGH (ref 92–96)
SAO2 % BLDV: 64 % — LOW (ref 67–88)
SODIUM SERPL-SCNC: 136 MMOL/L — SIGNIFICANT CHANGE UP (ref 135–145)
SODIUM SERPL-SCNC: 138 MMOL/L — SIGNIFICANT CHANGE UP (ref 135–145)
SODIUM SERPL-SCNC: 139 MMOL/L — SIGNIFICANT CHANGE UP (ref 135–145)
SPHEROCYTES BLD QL SMEAR: SLIGHT — SIGNIFICANT CHANGE UP
TROPONIN I SERPL-MCNC: 0.41 NG/ML — HIGH (ref 0–0.04)
WBC # BLD: 10.21 K/UL — SIGNIFICANT CHANGE UP (ref 3.8–10.5)
WBC # BLD: 10.28 K/UL — SIGNIFICANT CHANGE UP (ref 3.8–10.5)
WBC # BLD: 13.42 K/UL — HIGH (ref 3.8–10.5)
WBC # BLD: 19.22 K/UL — HIGH (ref 3.8–10.5)
WBC # BLD: 21.9 K/UL — HIGH (ref 3.8–10.5)
WBC # BLD: 23.21 K/UL — HIGH (ref 3.8–10.5)
WBC # FLD AUTO: 10.21 K/UL — SIGNIFICANT CHANGE UP (ref 3.8–10.5)
WBC # FLD AUTO: 10.28 K/UL — SIGNIFICANT CHANGE UP (ref 3.8–10.5)
WBC # FLD AUTO: 13.42 K/UL — HIGH (ref 3.8–10.5)
WBC # FLD AUTO: 19.22 K/UL — HIGH (ref 3.8–10.5)
WBC # FLD AUTO: 21.9 K/UL — HIGH (ref 3.8–10.5)
WBC # FLD AUTO: 23.21 K/UL — HIGH (ref 3.8–10.5)

## 2021-02-18 PROCEDURE — 99291 CRITICAL CARE FIRST HOUR: CPT

## 2021-02-18 PROCEDURE — 71045 X-RAY EXAM CHEST 1 VIEW: CPT | Mod: 26,77

## 2021-02-18 PROCEDURE — 71045 X-RAY EXAM CHEST 1 VIEW: CPT | Mod: 26

## 2021-02-18 RX ORDER — INSULIN HUMAN 100 [IU]/ML
10 INJECTION, SOLUTION SUBCUTANEOUS ONCE
Refills: 0 | Status: DISCONTINUED | OUTPATIENT
Start: 2021-02-18 | End: 2021-02-18

## 2021-02-18 RX ORDER — NOREPINEPHRINE BITARTRATE/D5W 8 MG/250ML
0.05 PLASTIC BAG, INJECTION (ML) INTRAVENOUS
Qty: 16 | Refills: 0 | Status: DISCONTINUED | OUTPATIENT
Start: 2021-02-18 | End: 2021-02-19

## 2021-02-18 RX ORDER — CEFAZOLIN SODIUM 1 G
1000 VIAL (EA) INJECTION ONCE
Refills: 0 | Status: COMPLETED | OUTPATIENT
Start: 2021-02-18 | End: 2021-02-19

## 2021-02-18 RX ORDER — CHLORHEXIDINE GLUCONATE 213 G/1000ML
1 SOLUTION TOPICAL
Refills: 0 | Status: DISCONTINUED | OUTPATIENT
Start: 2021-02-18 | End: 2021-02-19

## 2021-02-18 RX ORDER — MAGNESIUM OXIDE 400 MG ORAL TABLET 241.3 MG
400 TABLET ORAL ONCE
Refills: 0 | Status: DISCONTINUED | OUTPATIENT
Start: 2021-02-18 | End: 2021-02-18

## 2021-02-18 RX ORDER — SODIUM BICARBONATE 1 MEQ/ML
100 SYRINGE (ML) INTRAVENOUS ONCE
Refills: 0 | Status: COMPLETED | OUTPATIENT
Start: 2021-02-18 | End: 2021-02-18

## 2021-02-18 RX ORDER — SODIUM CHLORIDE 9 MG/ML
1000 INJECTION INTRAMUSCULAR; INTRAVENOUS; SUBCUTANEOUS ONCE
Refills: 0 | Status: COMPLETED | OUTPATIENT
Start: 2021-02-18 | End: 2021-02-18

## 2021-02-18 RX ORDER — PANTOPRAZOLE SODIUM 20 MG/1
40 TABLET, DELAYED RELEASE ORAL DAILY
Refills: 0 | Status: DISCONTINUED | OUTPATIENT
Start: 2021-02-18 | End: 2021-02-19

## 2021-02-18 RX ORDER — CHLORHEXIDINE GLUCONATE 213 G/1000ML
15 SOLUTION TOPICAL EVERY 12 HOURS
Refills: 0 | Status: DISCONTINUED | OUTPATIENT
Start: 2021-02-18 | End: 2021-02-19

## 2021-02-18 RX ORDER — SODIUM BICARBONATE 1 MEQ/ML
0.21 SYRINGE (ML) INTRAVENOUS
Qty: 150 | Refills: 0 | Status: DISCONTINUED | OUTPATIENT
Start: 2021-02-18 | End: 2021-02-18

## 2021-02-18 RX ORDER — SODIUM CHLORIDE 9 MG/ML
1000 INJECTION INTRAMUSCULAR; INTRAVENOUS; SUBCUTANEOUS
Refills: 0 | Status: DISCONTINUED | OUTPATIENT
Start: 2021-02-18 | End: 2021-02-18

## 2021-02-18 RX ORDER — POTASSIUM CHLORIDE 20 MEQ
20 PACKET (EA) ORAL
Refills: 0 | Status: DISCONTINUED | OUTPATIENT
Start: 2021-02-18 | End: 2021-02-18

## 2021-02-18 RX ORDER — CHLORHEXIDINE GLUCONATE 213 G/1000ML
15 SOLUTION TOPICAL EVERY 12 HOURS
Refills: 0 | Status: DISCONTINUED | OUTPATIENT
Start: 2021-02-18 | End: 2021-02-18

## 2021-02-18 RX ORDER — OXYCODONE HYDROCHLORIDE 5 MG/1
1 TABLET ORAL
Qty: 16 | Refills: 0
Start: 2021-02-18 | End: 2021-02-21

## 2021-02-18 RX ORDER — CEFAZOLIN SODIUM 1 G
1000 VIAL (EA) INJECTION EVERY 8 HOURS
Refills: 0 | Status: DISCONTINUED | OUTPATIENT
Start: 2021-02-19 | End: 2021-02-19

## 2021-02-18 RX ORDER — METOCLOPRAMIDE HCL 10 MG
10 TABLET ORAL ONCE
Refills: 0 | Status: COMPLETED | OUTPATIENT
Start: 2021-02-18 | End: 2021-02-18

## 2021-02-18 RX ORDER — CALCIUM GLUCONATE 100 MG/ML
1 VIAL (ML) INTRAVENOUS ONCE
Refills: 0 | Status: COMPLETED | OUTPATIENT
Start: 2021-02-18 | End: 2021-02-18

## 2021-02-18 RX ORDER — NOREPINEPHRINE BITARTRATE/D5W 8 MG/250ML
0.05 PLASTIC BAG, INJECTION (ML) INTRAVENOUS
Qty: 16 | Refills: 0 | Status: DISCONTINUED | OUTPATIENT
Start: 2021-02-18 | End: 2021-02-18

## 2021-02-18 RX ORDER — SODIUM CHLORIDE 9 MG/ML
500 INJECTION INTRAMUSCULAR; INTRAVENOUS; SUBCUTANEOUS ONCE
Refills: 0 | Status: COMPLETED | OUTPATIENT
Start: 2021-02-18 | End: 2021-02-18

## 2021-02-18 RX ORDER — CEFAZOLIN SODIUM 1 G
VIAL (EA) INJECTION
Refills: 0 | Status: DISCONTINUED | OUTPATIENT
Start: 2021-02-18 | End: 2021-02-18

## 2021-02-18 RX ORDER — CEFAZOLIN SODIUM 1 G
VIAL (EA) INJECTION
Refills: 0 | Status: DISCONTINUED | OUTPATIENT
Start: 2021-02-19 | End: 2021-02-19

## 2021-02-18 RX ORDER — SODIUM CHLORIDE 9 MG/ML
1000 INJECTION INTRAMUSCULAR; INTRAVENOUS; SUBCUTANEOUS ONCE
Refills: 0 | Status: DISCONTINUED | OUTPATIENT
Start: 2021-02-18 | End: 2021-02-18

## 2021-02-18 RX ORDER — PANTOPRAZOLE SODIUM 20 MG/1
40 TABLET, DELAYED RELEASE ORAL DAILY
Refills: 0 | Status: DISCONTINUED | OUTPATIENT
Start: 2021-02-18 | End: 2021-02-18

## 2021-02-18 RX ORDER — ACETAMINOPHEN 500 MG
1000 TABLET ORAL ONCE
Refills: 0 | Status: COMPLETED | OUTPATIENT
Start: 2021-02-18 | End: 2021-02-18

## 2021-02-18 RX ORDER — METOCLOPRAMIDE HCL 10 MG
10 TABLET ORAL EVERY 6 HOURS
Refills: 0 | Status: DISCONTINUED | OUTPATIENT
Start: 2021-02-18 | End: 2021-02-18

## 2021-02-18 RX ORDER — CHLORHEXIDINE GLUCONATE 213 G/1000ML
1 SOLUTION TOPICAL
Refills: 0 | Status: DISCONTINUED | OUTPATIENT
Start: 2021-02-18 | End: 2021-02-18

## 2021-02-18 RX ORDER — NOREPINEPHRINE BITARTRATE/D5W 8 MG/250ML
2 PLASTIC BAG, INJECTION (ML) INTRAVENOUS
Qty: 16 | Refills: 0 | Status: DISCONTINUED | OUTPATIENT
Start: 2021-02-18 | End: 2021-02-18

## 2021-02-18 RX ADMIN — ONDANSETRON 4 MILLIGRAM(S): 8 TABLET, FILM COATED ORAL at 11:22

## 2021-02-18 RX ADMIN — Medication 10 MILLIGRAM(S): at 02:59

## 2021-02-18 RX ADMIN — OXYCODONE HYDROCHLORIDE 5 MILLIGRAM(S): 5 TABLET ORAL at 15:21

## 2021-02-18 RX ADMIN — Medication 10 MILLIGRAM(S): at 12:57

## 2021-02-18 RX ADMIN — SODIUM CHLORIDE 500 MILLILITER(S): 9 INJECTION INTRAMUSCULAR; INTRAVENOUS; SUBCUTANEOUS at 05:21

## 2021-02-18 RX ADMIN — Medication 100 GRAM(S): at 21:00

## 2021-02-18 RX ADMIN — Medication 20 MILLIEQUIVALENT(S): at 12:18

## 2021-02-18 RX ADMIN — Medication 100 MILLIEQUIVALENT(S): at 20:33

## 2021-02-18 RX ADMIN — ONDANSETRON 4 MILLIGRAM(S): 8 TABLET, FILM COATED ORAL at 02:34

## 2021-02-18 RX ADMIN — SODIUM CHLORIDE 1000 MILLILITER(S): 9 INJECTION INTRAMUSCULAR; INTRAVENOUS; SUBCUTANEOUS at 06:23

## 2021-02-18 RX ADMIN — SODIUM CHLORIDE 1000 MILLILITER(S): 9 INJECTION INTRAMUSCULAR; INTRAVENOUS; SUBCUTANEOUS at 21:25

## 2021-02-18 RX ADMIN — Medication 400 MILLIGRAM(S): at 02:59

## 2021-02-18 RX ADMIN — Medication 136 MICROGRAM(S)/KG/MIN: at 21:25

## 2021-02-18 NOTE — DISCHARGE NOTE PROVIDER - CARE PROVIDER_API CALL
Gonzales Calles  COLON/RECTAL SURGERY  [address] [place] [address]  Phone: [phone]  Fax: [phone]  Follow Up Time:

## 2021-02-18 NOTE — CONSULT NOTE ADULT - SUBJECTIVE AND OBJECTIVE BOX
Patient is a 45y old  Female who presents with a chief complaint of post operative care s/p bilateral breast mastectomy (2021 03:54)      HPI:      Allergies    No Known Allergies    Intolerances        MEDICATIONS  (STANDING):  calcium gluconate IVPB 1 Gram(s) IV Intermittent once  insulin regular  human recombinant. 10 Unit(s) SubCutaneous once  magnesium oxide 400 milliGRAM(s) Oral once  norepinephrine Infusion 0.05 MICROgram(s)/kG/Min (3.4 mL/Hr) IV Continuous <Continuous>  potassium chloride    Tablet ER 20 milliEquivalent(s) Oral every 2 hours  sodium bicarbonate  Infusion 0.207 mEq/kG/Hr (100 mL/Hr) IV Continuous <Continuous>  sodium chloride 0.9%. 1000 milliLiter(s) (125 mL/Hr) IV Continuous <Continuous>    MEDICATIONS  (PRN):  metoclopramide Injectable 10 milliGRAM(s) IV Push every 6 hours PRN Nausea  ondansetron Injectable 4 milliGRAM(s) IV Push every 8 hours PRN Nausea and/or Vomiting  oxyCODONE    IR 5 milliGRAM(s) Oral every 6 hours PRN Severe Pain (7 - 10)      Daily     Daily     Drug Dosing Weight  Height (cm): 165.1 (2021 07:25)  Weight (kg): 72.6 (2021 07:25)  BMI (kg/m2): 26.6 (2021 07:25)  BSA (m2): 1.8 (2021 07:25)    PAST MEDICAL & SURGICAL HISTORY:  S/P chemotherapy, time since greater than 12 weeks  for right breast mass via left chest [facility]- ended 10/2020    HLD (hyperlipidemia)  controlled with diet    History of drainage of abscess  right groin  - I and D    Mass of right lung  hx - sx done    Deep vein thrombosis (DVT)  left  internal jugular vein (dx 2020 on Eliquis)    Hypertension    Breast cancer  dx in 2020, s/p chemo (completed in 10/2020)    S/P  section      History of vascular access device  2020- left chest  [facility]    Mass of right lung  right video assisted thoracoscopy robotic assisted right lower lobe wedge resection 2020    S/P right oophorectomy  2015    Ovarian cyst  removed in 2008    S/P biopsy  lung 2020    H/O tubal ligation          FAMILY HISTORY:  FH: myocardial infarction  father-    FH: hypertension  mother- alive        SOCIAL HISTORY:    ADVANCE DIRECTIVES:    REVIEW OF SYSTEMS:    CONSTITUTIONAL: No fever, weight loss, or fatigue  EYES: No eye pain, visual disturbances, or discharge  ENMT:  No difficulty hearing, tinnitus, vertigo; No sinus or throat pain  NECK: No pain or stiffness  BREASTS: No pain, masses, or nipple discharge  RESPIRATORY: No cough, wheezing, chills or hemoptysis; No shortness of breath  CARDIOVASCULAR: No chest pain, palpitations, dizziness, or leg swelling  GASTROINTESTINAL: No abdominal or epigastric pain. No nausea, vomiting, or hematemesis; No diarrhea or constipation. No melena or hematochezia.  GENITOURINARY: No dysuria, frequency, hematuria, or incontinence  NEUROLOGICAL: No headaches, memory loss, loss of strength, numbness, or tremors  SKIN: No itching, burning, rashes, or lesions   LYMPH NODES: No enlarged glands  ENDOCRINE: No heat or cold intolerance; No hair loss  MUSCULOSKELETAL: No joint pain or swelling; No muscle, back, or extremity pain  PSYCHIATRIC: No depression, anxiety, mood swings, or difficulty sleeping  HEME/LYMPH: No easy bruising, or bleeding gums  ALLERGY AND IMMUNOLOGIC: No hives or eczema      Mode: AC/ CMV (Assist Control/ Continuous Mandatory Ventilation)  RR (machine): 22  TV (machine): 450  FiO2: 100  PEEP: 5  ITime: 1  MAP: 9  PIP: 22      ICU Vital Signs Last 24 Hrs  T(C): 37.1 (2021 14:55), Max: 37.1 (2021 13:15)  T(F): 98.7 (2021 14:55), Max: 98.7 (2021 13:15)  HR: 100 (2021 21:07) (83 - 110)  BP: 108/68 (2021 14:55) (83/59 - 108/68)  BP(mean): --  ABP: --  ABP(mean): --  RR: 18 (2021 14:55) (16 - 20)  SpO2: 100% (2021 21:07) (98% - 100%)      ABG - ( 2021 21:05 )  pH, Arterial: 6.94  pH, Blood: x     /  pCO2: 39    /  pO2: 566   / HCO3: 8     / Base Excess: -21.7 /  SaO2: 99                  I&O's Detail    2021 07:01  -  2021 07:00  --------------------------------------------------------  IN:    Lactated Ringers Bolus: 1300 mL  Total IN: 1300 mL    OUT:    Bulb (mL): 55 mL    Bulb (mL): 140 mL    Bulb (mL): 100 mL    Bulb (mL): 65 mL  Total OUT: 360 mL    Total NET: 940 mL      2021 07:01  -  2021 21:20  --------------------------------------------------------  IN:    sodium chloride 0.9%: 1375 mL  Total IN: 1375 mL    OUT:    Bulb (mL): 63 mL    Bulb (mL): 75 mL    Bulb (mL): 135 mL    Bulb (mL): 235 mL  Total OUT: 508 mL    Total NET: 867 mL          PHYSICAL EXAM:    GENERAL: NAD, well-groomed, well-developed  HEAD:  Atraumatic, Normocephalic  EYES: EOMI, PERRLA, conjunctiva and sclera clear  ENMT: No tonsillar erythema, exudates, or enlargement; Moist mucous membranes, Good dentition, No lesions  NECK: Supple, No JVD, Normal thyroid  NERVOUS SYSTEM:  Alert & Oriented X3, Good concentration; Motor Strength 5/5 B/L upper and lower extremities; DTRs 2+ intact and symmetric  CHEST/LUNG: Clear to percussion bilaterally; No rales, rhonchi, wheezing, or rubs  HEART: Regular rate and rhythm; No murmurs, rubs, or gallops  ABDOMEN: Soft, Nontender, Nondistended; Bowel sounds present  EXTREMITIES:  2+ Peripheral Pulses, No clubbing, cyanosis, or edema  LYMPH: No lymphadenopathy noted  SKIN: No rashes or lesions    LABS:  CBC Full  -  ( 2021 20:15 )  WBC Count : 10.28 K/uL  RBC Count : 1.73 M/uL  Hemoglobin : 5.1 g/dL  Hematocrit : 16.8 %  Platelet Count - Automated : x  Mean Cell Volume : 97.1 fl  Mean Cell Hemoglobin : 29.5 pg  Mean Cell Hemoglobin Concentration : 30.4 gm/dL  Auto Neutrophil # : x  Auto Lymphocyte # : x  Auto Monocyte # : x  Auto Eosinophil # : x  Auto Basophil # : x  Auto Neutrophil % : x  Auto Lymphocyte % : x  Auto Monocyte % : x  Auto Eosinophil % : x  Auto Basophil % : x        136  |  98  |  21<H>  ----------------------------<  437<H>  6.0<H>   |  11<L>  |  1.74<H>    Ca    7.8<L>      2021 20:15  Phos  13.2       Mg     1.9         TPro  2.9<L>  /  Alb  1.2<L>  /  TBili  0.3  /  DBili  x   /  AST  1070<H>  /  ALT  738<H>  /  AlkPhos  95      CAPILLARY BLOOD GLUCOSE      POCT Blood Glucose.: 165 mg/dL (2021 19:54)    PT/INR - ( 2021 20:15 )   PT: 23.8 sec;   INR: 2.07 ratio         PTT - ( 2021 20:15 )  PTT:81.5 sec    CARDIAC MARKERS ( 2021 20:15 )  0.415 ng/mL / x     / x     / x     / x              EKG:    ECHO, US:    RADIOLOGY:    CRITICAL CARE TIME SPENT:   Patient is a 45y old  Female who presents with a chief complaint of post operative care s/p bilateral breast mastectomy (2021 03:54)      HPI: 46 yo female with hx of HTN, breast CA (dx in 2020) s/p chemo (completed in 10/2020), DVt  [facility](2020 on el[facility]is)  was admitted to Surgical unit for elective for  right breast total mastectomy with preop needle localization and right breast sentinel node bx, left breast prophylactic total mastectomy with left breast preop needle localization on 2021. Post OR pt was RRT on  night for profuse diaphoresis and lightheadedness and hypotension. Pt was given IVF. On  her Hb in am dropped to 8 from 11 and pt was transfused 2 RBCs .Around 19:45 pt was RRT as pt is found unresponsive and  then went pulseless. Code blue was called and CPR was initiated. R femoral CVC was placed. ROSC was achieved after 8 mins( 3 rounds of Epi+ sod bicarb x1, D50 x 1, rhythm was asystole) . Pt was profusely bleeding in the ALEJANDRO drains( 500 cc in total from 7am-7pm shift).  CBC returned with hemoglobin 5.1. Decision was therefore made to take the patient to the operating room to pursue active bleeding. Patient taken to OR, the left mastectomy site was opened. Two small pumping vessels were found and ligated. Patient appeared to be in DIC and had blood oozing from bare surfaces including around her central line. The surgical site was promptly closed after confirmation of no continued bleeding. She was taken to ICU for continued resuscitation.  2 given preop in ICU, 2 U PRBC given intraop with 4 of FFP and 1 of platelets. Urine output was minimal during the case.          Allergies    No Known Allergies    Intolerances        MEDICATIONS  (STANDING):  calcium gluconate IVPB 1 Gram(s) IV Intermittent once  insulin regular  human recombinant. 10 Unit(s) SubCutaneous once  magnesium oxide 400 milliGRAM(s) Oral once  norepinephrine Infusion 0.05 MICROgram(s)/kG/Min (3.4 mL/Hr) IV Continuous <Continuous>  potassium chloride    Tablet ER 20 milliEquivalent(s) Oral every 2 hours  sodium bicarbonate  Infusion 0.207 mEq/kG/Hr (100 mL/Hr) IV Continuous <Continuous>  sodium chloride 0.9%. 1000 milliLiter(s) (125 mL/Hr) IV Continuous <Continuous>    MEDICATIONS  (PRN):  metoclopramide Injectable 10 milliGRAM(s) IV Push every 6 hours PRN Nausea  ondansetron Injectable 4 milliGRAM(s) IV Push every 8 hours PRN Nausea and/or Vomiting  oxyCODONE    IR 5 milliGRAM(s) Oral every 6 hours PRN Severe Pain (7 - 10)      Daily     Daily     Drug Dosing Weight  Height (cm): 165.1 (2021 07:25)  Weight (kg): 72.6 (2021 07:25)  BMI (kg/m2): 26.6 (2021 07:25)  BSA (m2): 1.8 (2021 07:25)    PAST MEDICAL & SURGICAL HISTORY:  S/P chemotherapy, time since greater than 12 weeks  for right breast mass via left chest [facility]- ended 10/2020    HLD (hyperlipidemia)  controlled with diet    History of drainage of abscess  right groin  - I and D    Mass of right lung  hx - sx done    Deep vein thrombosis (DVT)  left  internal jugular vein (dx 2020 on Eliquis)    Hypertension    Breast cancer  dx in 2020, s/p chemo (completed in 10/2020)    S/P  section      History of vascular access device  2020- left chest  [facility]    Mass of right lung  right video assisted thoracoscopy robotic assisted right lower lobe wedge resection 2020    S/P right oophorectomy  2015    Ovarian cyst  removed in 2008    S/P biopsy  lung 2020    H/O tubal ligation          FAMILY HISTORY:  FH: myocardial infarction  father-    FH: hypertension  mother- alive        SOCIAL HISTORY:    ADVANCE DIRECTIVES:    REVIEW OF SYSTEMS:    CONSTITUTIONAL: No fever, weight loss, or fatigue  EYES: No eye pain, visual disturbances, or discharge  ENMT:  No difficulty hearing, tinnitus, vertigo; No sinus or throat pain  NECK: No pain or stiffness  BREASTS: No pain, masses, or nipple discharge  RESPIRATORY: No cough, wheezing, chills or hemoptysis; No shortness of breath  CARDIOVASCULAR: No chest pain, palpitations, dizziness, or leg swelling  GASTROINTESTINAL: No abdominal or epigastric pain. No nausea, vomiting, or hematemesis; No diarrhea or constipation. No melena or hematochezia.  GENITOURINARY: No dysuria, frequency, hematuria, or incontinence  NEUROLOGICAL: No headaches, memory loss, loss of strength, numbness, or tremors  SKIN: No itching, burning, rashes, or lesions   LYMPH NODES: No enlarged glands  ENDOCRINE: No heat or cold intolerance; No hair loss  MUSCULOSKELETAL: No joint pain or swelling; No muscle, back, or extremity pain  PSYCHIATRIC: No depression, anxiety, mood swings, or difficulty sleeping  HEME/LYMPH: No easy bruising, or bleeding gums  ALLERGY AND IMMUNOLOGIC: No hives or eczema      Mode: AC/ CMV (Assist Control/ Continuous Mandatory Ventilation)  RR (machine): 22  TV (machine): 450  FiO2: 100  PEEP: 5  ITime: 1  MAP: 9  PIP: 22      ICU Vital Signs Last 24 Hrs  T(C): 37.1 (2021 14:55), Max: 37.1 (2021 13:15)  T(F): 98.7 (2021 14:55), Max: 98.7 (2021 13:15)  HR: 100 (2021 21:07) (83 - 110)  BP: 108/68 (2021 14:55) (83/59 - 108/68)  BP(mean): --  ABP: --  ABP(mean): --  RR: 18 (2021 14:55) (16 - 20)  SpO2: 100% (2021 21:07) (98% - 100%)      ABG - ( 2021 21:05 )  pH, Arterial: 6.94  pH, Blood: x     /  pCO2: 39    /  pO2: 566   / HCO3: 8     / Base Excess: -21.7 /  SaO2: 99                  I&O's Detail    2021 07:01  -  2021 07:00  --------------------------------------------------------  IN:    Lactated Ringers Bolus: 1300 mL  Total IN: 1300 mL    OUT:    Bulb (mL): 55 mL    Bulb (mL): 140 mL    Bulb (mL): 100 mL    Bulb (mL): 65 mL  Total OUT: 360 mL    Total NET: 940 mL      2021 07:01  -  2021 21:20  --------------------------------------------------------  IN:    sodium chloride 0.9%: 1375 mL  Total IN: 1375 mL    OUT:    Bulb (mL): 63 mL    Bulb (mL): 75 mL    Bulb (mL): 135 mL    Bulb (mL): 235 mL  Total OUT: 508 mL    Total NET: 867 mL          PHYSICAL EXAM:    GENERAL: NAD, well-groomed, well-developed  HEAD:  Atraumatic, Normocephalic  EYES: EOMI, PERRLA, conjunctiva and sclera clear  ENMT: No tonsillar erythema, exudates, or enlargement; Moist mucous membranes, Good dentition, No lesions  NECK: Supple, No JVD, Normal thyroid  NERVOUS SYSTEM:  Alert & Oriented X3, Good concentration; Motor Strength 5/5 B/L upper and lower extremities; DTRs 2+ intact and symmetric  CHEST/LUNG: Clear to percussion bilaterally; No rales, rhonchi, wheezing, or rubs  HEART: Regular rate and rhythm; No murmurs, rubs, or gallops  ABDOMEN: Soft, Nontender, Nondistended; Bowel sounds present  EXTREMITIES:  2+ Peripheral Pulses, No clubbing, cyanosis, or edema  LYMPH: No lymphadenopathy noted  SKIN: No rashes or lesions    LABS:  CBC Full  -  ( 2021 20:15 )  WBC Count : 10.28 K/uL  RBC Count : 1.73 M/uL  Hemoglobin : 5.1 g/dL  Hematocrit : 16.8 %  Platelet Count - Automated : x  Mean Cell Volume : 97.1 fl  Mean Cell Hemoglobin : 29.5 pg  Mean Cell Hemoglobin Concentration : 30.4 gm/dL  Auto Neutrophil # : x  Auto Lymphocyte # : x  Auto Monocyte # : x  Auto Eosinophil # : x  Auto Basophil # : x  Auto Neutrophil % : x  Auto Lymphocyte % : x  Auto Monocyte % : x  Auto Eosinophil % : x  Auto Basophil % : x    02-18    136  |  98  |  21<H>  ----------------------------<  437<H>  6.0<H>   |  11<L>  |  1.74<H>    Ca    7.8<L>      2021 20:15  Phos  13.2     -  Mg     1.9         TPro  2.9<L>  /  Alb  1.2<L>  /  TBili  0.3  /  DBili  x   /  AST  1070<H>  /  ALT  738<H>  /  AlkPhos  95      CAPILLARY BLOOD GLUCOSE      POCT Blood Glucose.: 165 mg/dL (2021 19:54)    PT/INR - ( 2021 20:15 )   PT: 23.8 sec;   INR: 2.07 ratio         PTT - ( 2021 20:15 )  PTT:81.5 sec    CARDIAC MARKERS ( 2021 20:15 )  0.415 ng/mL / x     / x     / x     / x              EKG:    ECHO, US:    RADIOLOGY:    CRITICAL CARE TIME SPENT:   Patient is a 45y old  Female who presents with a chief complaint of post operative care s/p bilateral breast mastectomy (2021 03:54)      HPI: 44 yo female with hx of HTN, breast CA (dx in 2020) s/p chemo (completed in 10/2020), DVt  [facility](2020 on [facility])  was admitted to Surgical unit for elective for  right breast total mastectomy with preop needle localization and right breast sentinel node bx, left breast prophylactic total mastectomy with left breast preop needle localization on 2021. Post OR pt was RRT on  night for profuse diaphoresis and lightheadedness and hypotension. Pt was given IVF. On  her Hb in am dropped to 8 from 11 and pt was transfused 2 RBCs .Around 19:45 pt was RRT as pt is found unresponsive and  then went pulseless. Code blue was called and CPR was initiated. R femoral CVC was placed. ROSC was achieved after 8 mins( 3 rounds of Epi+ sod bicarb x1, D50 x 1, rhythm was asystole) . Pt was profusely bleeding in the ALEJANDRO drains( 500 cc in total from 7am-7pm shift).  CBC returned with hemoglobin 5.1. Decision was therefore made to take the patient to the operating room to pursue active bleeding. Patient taken to OR, the left mastectomy site was opened. Two small pumping vessels were found and ligated. Patient appeared to be in DIC and had blood oozing from bare surfaces including around her central line. The surgical site was promptly closed after confirmation of no continued bleeding. She was taken to ICU for continued resuscitation.  2 given preop in ICU, 2 U PRBC given intraop with 4 of FFP and 1 of platelets. Urine output was minimal during the case. Pt came back to ICU post OR .          Allergies    No Known Allergies    Intolerances        MEDICATIONS  (STANDING):  calcium gluconate IVPB 1 Gram(s) IV Intermittent once  insulin regular  human recombinant. 10 Unit(s) SubCutaneous once  magnesium oxide 400 milliGRAM(s) Oral once  norepinephrine Infusion 0.05 MICROgram(s)/kG/Min (3.4 mL/Hr) IV Continuous <Continuous>  potassium chloride    Tablet ER 20 milliEquivalent(s) Oral every 2 hours  sodium bicarbonate  Infusion 0.207 mEq/kG/Hr (100 mL/Hr) IV Continuous <Continuous>  sodium chloride 0.9%. 1000 milliLiter(s) (125 mL/Hr) IV Continuous <Continuous>    MEDICATIONS  (PRN):  metoclopramide Injectable 10 milliGRAM(s) IV Push every 6 hours PRN Nausea  ondansetron Injectable 4 milliGRAM(s) IV Push every 8 hours PRN Nausea and/or Vomiting  oxyCODONE    IR 5 milliGRAM(s) Oral every 6 hours PRN Severe Pain (7 - 10)      Daily     Daily     Drug Dosing Weight  Height (cm): 165.1 (2021 07:25)  Weight (kg): 72.6 (2021 07:25)  BMI (kg/m2): 26.6 (2021 07:25)  BSA (m2): 1.8 (2021 07:25)    PAST MEDICAL & SURGICAL HISTORY:  S/P chemotherapy, time since greater than 12 weeks  for right breast mass via left chest [facility]- ended 10/2020    HLD (hyperlipidemia)  controlled with diet    History of drainage of abscess  right groin  - I and D    Mass of right lung  hx - sx done    Deep vein thrombosis (DVT)  left  internal jugular vein (dx 2020 on Eliquis)    Hypertension    Breast cancer  dx in 2020, s/p chemo (completed in 10/2020)    S/P  section      History of vascular access device  2020- left chest  [facility]    Mass of right lung  right video assisted thoracoscopy robotic assisted right lower lobe wedge resection 2020    S/P right oophorectomy  2015    Ovarian cyst  removed in 2008    S/P biopsy  lung 2020    H/O tubal ligation          FAMILY HISTORY:  FH: myocardial infarction  father-    FH: hypertension  mother- alive    REVIEW OF SYSTEMS:  unable to obtain as pt is intubated     Mode: AC/ CMV (Assist Control/ Continuous Mandatory Ventilation)  RR (machine): 22  TV (machine): 450  FiO2: 100  PEEP: 5  ITime: 1  MAP: 9  PIP: 22      ICU Vital Signs Last 24 Hrs  T(C): 37.1 (2021 14:55), Max: 37.1 (2021 13:15)  T(F): 98.7 (2021 14:55), Max: 98.7 (2021 13:15)  HR: 100 (2021 21:07) (83 - 110)  BP: 108/68 (2021 14:55) (83/59 - 108/68)  BP(mean): --  ABP: --  ABP(mean): --  RR: 18 (2021 14:55) (16 - 20)  SpO2: 100% (2021 21:07) (98% - 100%)      ABG - ( 2021 21:05 )  pH, Arterial: 6.94  pH, Blood: x     /  pCO2: 39    /  pO2: 566   / HCO3: 8     / Base Excess: -21.7 /  SaO2: 99                  I&O's Detail    2021 07:01  -  2021 07:00  --------------------------------------------------------  IN:    Lactated Ringers Bolus: 1300 mL  Total IN: 1300 mL    OUT:    Bulb (mL): 55 mL    Bulb (mL): 140 mL    Bulb (mL): 100 mL    Bulb (mL): 65 mL  Total OUT: 360 mL    Total NET: 940 mL      2021 07:01  -  2021 21:20  --------------------------------------------------------  IN:    sodium chloride 0.9%: 1375 mL  Total IN: 1375 mL    OUT:    Bulb (mL): 63 mL    Bulb (mL): 75 mL    Bulb (mL): 135 mL    Bulb (mL): 235 mL  Total OUT: 508 mL    Total NET: 867 mL          PHYSICAL EXAM:    GENERAL: Moderate obese F intubated and unresponsive   HEAD:  Atraumatic, Normocephalic  EYES: Dilated    ENMT: ETT in place   NECK: Supple, No JVD, Normal thyroid  NERVOUS SYSTEM: Unresponsive without any sedation   CHEST/LUNG: clear to auscultate b/l  No rales, rhonchi, wheezing, or rubs. dressing in place with active oozing from the ALEJANDRO drains  HEART: Regular rate and rhythm; No murmurs, rubs, or gallops  ABDOMEN: Soft,  Nondistended; Bowel sounds present  EXTREMITIES:  2+ Peripheral Pulses, No clubbing, cyanosis, or edema  LYMPH: No lymphadenopathy noted  SKIN: No rashes or lesions    LABS:  CBC Full  -  ( 2021 20:15 )  WBC Count : 10.28 K/uL  RBC Count : 1.73 M/uL  Hemoglobin : 5.1 g/dL  Hematocrit : 16.8 %  Platelet Count - Automated : x  Mean Cell Volume : 97.1 fl  Mean Cell Hemoglobin : 29.5 pg  Mean Cell Hemoglobin Concentration : 30.4 gm/dL  Auto Neutrophil # : x  Auto Lymphocyte # : x  Auto Monocyte # : x  Auto Eosinophil # : x  Auto Basophil # : x  Auto Neutrophil % : x  Auto Lymphocyte % : x  Auto Monocyte % : x  Auto Eosinophil % : x  Auto Basophil % : x        136  |  98  |  21<H>  ----------------------------<  437<H>  6.0<H>   |  11<L>  |  1.74<H>    Ca    7.8<L>      2021 20:15  Phos  13.2     02-18  Mg     1.9     -18    TPro  2.9<L>  /  Alb  1.2<L>  /  TBili  0.3  /  DBili  x   /  AST  1070<H>  /  ALT  738<H>  /  AlkPhos  95  02-18    CAPILLARY BLOOD GLUCOSE      POCT Blood Glucose.: 165 mg/dL (2021 19:54)    PT/INR - ( 2021 20:15 )   PT: 23.8 sec;   INR: 2.07 ratio         PTT - ( 2021 20:15 )  PTT:81.5 sec    CARDIAC MARKERS ( 2021 20:15 )  0.415 ng/mL / x     / x     / x     / x              EKG: sinus tachycardia with no ST -T changes       RADIOLOGY:    CRITICAL CARE TIME SPENT:   Patient is a 45y old  Female who presents with a chief complaint of post operative care s/p bilateral breast mastectomy (2021 03:54)      HPI: 44 yo female with hx of HTN, breast CA (dx in 2020) s/p chemo (completed in 10/2020), DVt  [facility](2020 on [facility])  was admitted to Surgical unit for elective for  right breast total mastectomy with preop needle localization and right breast sentinel node bx, left breast prophylactic total mastectomy with left breast preop needle localization on 2021. Post OR pt was RRT on  night for profuse diaphoresis and lightheadedness and hypotension. Pt was given IVF. On  her Hb in am dropped to 8 from 11 and pt was transfused 2 RBCs .Around 19:45 pt was RRT as pt is found unresponsive and  then went pulseless. Code blue was called and CPR was initiated. R femoral CVC was placed. ROSC was achieved after 8 mins( 3 rounds of Epi+ sod bicarb x1, D50 x 1, rhythm was asystole) . Pt was  bleeding in the ALEJANDRO drains( 500 cc in total from 7am-7pm shift).  CBC returned with hemoglobin 5.1. Decision was therefore made to take the patient to the operating room to pursue active bleeding. Patient taken to OR, the left mastectomy site was opened. Two small pumping vessels were found and ligated. Patient appeared to be in DIC and had blood oozing from bare surfaces including around her central line. The surgical site was promptly closed after confirmation of no continued bleeding. She was taken to ICU for continued resuscitation.  2 given preop in ICU, 2 U PRBC given intraop with 4 of FFP and 1 of platelets. Urine output was minimal during the case. Pt came back to ICU post OR .          Allergies    No Known Allergies    Intolerances        MEDICATIONS  (STANDING):  calcium gluconate IVPB 1 Gram(s) IV Intermittent once  insulin regular  human recombinant. 10 Unit(s) SubCutaneous once  magnesium oxide 400 milliGRAM(s) Oral once  norepinephrine Infusion 0.05 MICROgram(s)/kG/Min (3.4 mL/Hr) IV Continuous <Continuous>  potassium chloride    Tablet ER 20 milliEquivalent(s) Oral every 2 hours  sodium bicarbonate  Infusion 0.207 mEq/kG/Hr (100 mL/Hr) IV Continuous <Continuous>  sodium chloride 0.9%. 1000 milliLiter(s) (125 mL/Hr) IV Continuous <Continuous>    MEDICATIONS  (PRN):  metoclopramide Injectable 10 milliGRAM(s) IV Push every 6 hours PRN Nausea  ondansetron Injectable 4 milliGRAM(s) IV Push every 8 hours PRN Nausea and/or Vomiting  oxyCODONE    IR 5 milliGRAM(s) Oral every 6 hours PRN Severe Pain (7 - 10)      Daily     Daily     Drug Dosing Weight  Height (cm): 165.1 (2021 07:25)  Weight (kg): 72.6 (2021 07:25)  BMI (kg/m2): 26.6 (2021 07:25)  BSA (m2): 1.8 (2021 07:25)    PAST MEDICAL & SURGICAL HISTORY:  S/P chemotherapy, time since greater than 12 weeks  for right breast mass via left chest [facility]- ended 10/2020    HLD (hyperlipidemia)  controlled with diet    History of drainage of abscess  right groin  - I and D    Mass of right lung  hx - sx done    Deep vein thrombosis (DVT)  left  internal jugular vein (dx 2020 on Eliquis)    Hypertension    Breast cancer  dx in 2020, s/p chemo (completed in 10/2020)    S/P  section      History of vascular access device  2020- left chest  [facility]    Mass of right lung  right video assisted thoracoscopy robotic assisted right lower lobe wedge resection 2020    S/P right oophorectomy  2015    Ovarian cyst  removed in 2008    S/P biopsy  lung 2020    H/O tubal ligation          FAMILY HISTORY:  FH: myocardial infarction  father-    FH: hypertension  mother- alive    REVIEW OF SYSTEMS:  unable to obtain as pt is intubated     Mode: AC/ CMV (Assist Control/ Continuous Mandatory Ventilation)  RR (machine): 22  TV (machine): 450  FiO2: 100  PEEP: 5  ITime: 1  MAP: 9  PIP: 22      ICU Vital Signs Last 24 Hrs  T(C): 37.1 (2021 14:55), Max: 37.1 (2021 13:15)  T(F): 98.7 (2021 14:55), Max: 98.7 (2021 13:15)  HR: 100 (2021 21:07) (83 - 110)  BP: 108/68 (2021 14:55) (83/59 - 108/68)  BP(mean): --  ABP: --  ABP(mean): --  RR: 18 (2021 14:55) (16 - 20)  SpO2: 100% (2021 21:07) (98% - 100%)      ABG - ( 2021 21:05 )  pH, Arterial: 6.94  pH, Blood: x     /  pCO2: 39    /  pO2: 566   / HCO3: 8     / Base Excess: -21.7 /  SaO2: 99                  I&O's Detail    2021 07:01  -  2021 07:00  --------------------------------------------------------  IN:    Lactated Ringers Bolus: 1300 mL  Total IN: 1300 mL    OUT:    Bulb (mL): 55 mL    Bulb (mL): 140 mL    Bulb (mL): 100 mL    Bulb (mL): 65 mL  Total OUT: 360 mL    Total NET: 940 mL      2021 07:01  -  2021 21:20  --------------------------------------------------------  IN:    sodium chloride 0.9%: 1375 mL  Total IN: 1375 mL    OUT:    Bulb (mL): 63 mL    Bulb (mL): 75 mL    Bulb (mL): 135 mL    Bulb (mL): 235 mL  Total OUT: 508 mL    Total NET: 867 mL          PHYSICAL EXAM:    GENERAL: Moderate obese F intubated and unresponsive   HEAD:  Atraumatic, Normocephalic  EYES: Dilated    ENMT: ETT in place   NECK: Supple, No JVD, Normal thyroid  NERVOUS SYSTEM: Unresponsive without any sedation   CHEST/LUNG: clear to auscultate b/l  No rales, rhonchi, wheezing, or rubs. dressing in place with active oozing from the ALEJANDRO drains  HEART: Regular rate and rhythm; No murmurs, rubs, or gallops  ABDOMEN: Soft,  Nondistended; Bowel sounds present  EXTREMITIES:  2+ Peripheral Pulses, No clubbing, cyanosis, or edema  LYMPH: No lymphadenopathy noted  SKIN: No rashes or lesions    LABS:  CBC Full  -  ( 2021 20:15 )  WBC Count : 10.28 K/uL  RBC Count : 1.73 M/uL  Hemoglobin : 5.1 g/dL  Hematocrit : 16.8 %  Platelet Count - Automated : x  Mean Cell Volume : 97.1 fl  Mean Cell Hemoglobin : 29.5 pg  Mean Cell Hemoglobin Concentration : 30.4 gm/dL  Auto Neutrophil # : x  Auto Lymphocyte # : x  Auto Monocyte # : x  Auto Eosinophil # : x  Auto Basophil # : x  Auto Neutrophil % : x  Auto Lymphocyte % : x  Auto Monocyte % : x  Auto Eosinophil % : x  Auto Basophil % : x        136  |  98  |  21<H>  ----------------------------<  437<H>  6.0<H>   |  11<L>  |  1.74<H>    Ca    7.8<L>      2021 20:15  Phos  13.2     -  Mg     1.9     -18    TPro  2.9<L>  /  Alb  1.2<L>  /  TBili  0.3  /  DBili  x   /  AST  1070<H>  /  ALT  738<H>  /  AlkPhos  95  -18    CAPILLARY BLOOD GLUCOSE      POCT Blood Glucose.: 165 mg/dL (2021 19:54)    PT/INR - ( 2021 20:15 )   PT: 23.8 sec;   INR: 2.07 ratio         PTT - ( 2021 20:15 )  PTT:81.5 sec    CARDIAC MARKERS ( 2021 20:15 )  0.415 ng/mL / x     / x     / x     / x              EKG: sinus tachycardia with no ST -T changes , low vltage      RADIOLOGY:    CRITICAL CARE TIME SPENT:

## 2021-02-18 NOTE — CHART NOTE - NSCHARTNOTEFT_GEN_A_CORE
Rapid response called after pt witnessed with profuse diaphoresis. Earlier call was made to surgical team that patient had nausea and pain, and became very emotional. Pt was seen at bedside, c/o UOQ pain of left chest. Dressing C/D/I, surrounding tissue soft, nonfluctuant. ALEJANDRO outputs sanguinous. Vitals stable. Pain and nausea addressed. Vitals now show SBP in 90's, similar to early evening vitals. EKG negative for acute changes. Surgical bra removed and dressing checked again. Upper and lateral chest remain soft, nonfluctuant. ALEJANDRO tubings stripped. Discussed with rapid response team, will give bolus NS and reassess BP. BP med held. f/u labs for acute anemia.

## 2021-02-18 NOTE — PROGRESS NOTE ADULT - SUBJECTIVE AND OBJECTIVE BOX
Patient s/p code as she was found unresponsive. Resuscitation efforts successful in obtaining a BP on levophed and s/p intubation.    HB now is 5.1/16%(Hct).  /66 - levophed lowered.  She is getting pRBC's.  FFP and platlets ordered as well.    In view of continued bleeding, will take patient to the OR for exploration of wound(s) for cessation of bleeding.    Discussed with  (Sebastian Montoya),  He is agreeable to the procedure and has given consent by telephone.  This was witnessed by ICU physician.

## 2021-02-18 NOTE — DISCHARGE NOTE PROVIDER - NSDCMRMEDTOKEN_GEN_ALL_CORE_FT
amLODIPine 5 mg oral tablet: 1 tab(s) orally once a day  Eliquis 5 mg oral tablet: 1 tab(s) orally 2 times a day  ibuprofen 400 mg oral tablet: 1 tab(s) orally every 6 hours, As Needed

## 2021-02-18 NOTE — DISCHARGE NOTE PROVIDER - NSDCCPCAREPLAN_GEN_ALL_CORE_FT
PRINCIPAL DISCHARGE DIAGNOSIS  Diagnosis: Breast cancer, right  Assessment and Plan of Treatment:

## 2021-02-18 NOTE — DISCHARGE NOTE PROVIDER - NSDCCPTREATMENT_GEN_ALL_CORE_FT
PRINCIPAL PROCEDURE  Procedure: Bilateral total mastectomy  Findings and Treatment: During this admission, you had mastectomies of both breasts. You have four drains in place, two on each side. Every day, you should record how much comes out of each drain, and bring that record to your follow up appointments. When the amount coming out is low enough, Dr. Calles will be able to remove those drains in the office. Before discharge, the nurse instructed you how to manage you drains. Please follow those instructions when at home.   You are likely to experience pain from the surgery at home. A prescription for oxycodone for severe pain has been sent to your pharmacy. Only use this medication if the pain is severe. Otherwise, alternate tylenol and motrin every 3 hours to help get control of your pain. Do not drive while taking oxycodone.   Showering is ok. Let the soap and water run over your incisions but do not scrub. Do not take a bath for 7 days.       PRINCIPAL PROCEDURE  Procedure: Bilateral total mastectomy  Findings and Treatment: During this admission, you had mastectomies of both breasts. You have four drains in place, two on each side. Every day, you should record how much comes out of each drain, and bring that record to your follow up appointments. When the amount coming out is low enough, Dr. Calles will be able to remove those drains in the office. Before discharge, the nurse instructed you how to manage you drains. Please follow those instructions when at home.   You are likely to experience pain from the surgery at home. A prescription for oxycodone for severe pain has been sent to your pharmacy. Only use this medication if the pain is severe. Otherwise, alternate tylenol and motrin every 3 hours to help get control of your pain. Do not drive while taking oxycodone.   Showering is ok. Let the soap and water run over your incisions but do not scrub. Do not take a bath for 7 days. The steri-strips (small pieces of tape) will fall off on their own.  You take Eliquis at home, which was stopped for this surgery, and you were taking lovenox instead. You are able to restart your Eliquis on 2/19, and once you restart it STOP taking the lovenox.

## 2021-02-18 NOTE — PROCEDURE NOTE - NSICDXPROCEDURE_GEN_ALL_CORE_FT
PROCEDURES:  Central venous catheter placement with ultrasound guidance 18-Feb-2021 20:41:08  Mari Diggs

## 2021-02-18 NOTE — PROGRESS NOTE ADULT - ASSESSMENT
It appears that she has stopped bleeding or it is under control.  Will observe for now.  A return to the OR and washout of the left mastectomy site will be put on hold.    Will continue to monitor Hct and vital signs.  If she worsens adn here is evidence of further bleeding, will take back to the OR for exploration of wound.

## 2021-02-18 NOTE — PROGRESS NOTE ADULT - SUBJECTIVE AND OBJECTIVE BOX
45F POD1 bilateral mastectomy    Patient with RRT last night for diapharesis and acute drop in hemoglobin. Seen at bedside she is receiving a unit PRBC. Continues to describe nausea and hot flashes that she ascribes to previous chemotherapy. Drains continue to put out sanguinous, lower output than overnight. Last BP low, systolic in 80s. Repeat CBC pending after transfusion. Therapeutic lovenox is held. ROS otherwise negative    Vital Signs Last 24 Hrs  T(C): 36.3 (18 Feb 2021 06:38), Max: 37.1 (17 Feb 2021 18:35)  T(F): 97.3 (18 Feb 2021 06:38), Max: 98.7 (17 Feb 2021 18:35)  HR: 91 (18 Feb 2021 06:38) (76 - 96)  BP: 83/59 (18 Feb 2021 06:38) (83/59 - 129/74)  BP(mean): 83 (17 Feb 2021 13:17) (80 - 90)  RR: 20 (18 Feb 2021 06:38) (12 - 20)  SpO2: 100% (18 Feb 2021 06:38) (96% - 100%)    Gen: NAD   Resp nonlabored  chest dressing in place, no strike through bulbs with sanguinous output, more clear than last night  Neuro intact

## 2021-02-18 NOTE — PROGRESS NOTE ADULT - ASSESSMENT
45F POD1 bilateral mastectomy with acute drop in hb postoperatively  - pressure dressing over chest wall  - continue monitor drain output  - repeat CBC  - monitor vitals  - hold therapeutic lovenox  - hold antihypertensive medications

## 2021-02-18 NOTE — DISCHARGE NOTE PROVIDER - HOSPITAL COURSE
Patient was admitted to the hospital after undergoing a right breast mastectomy and right sentinel lymph node biopsy and a prophylactic left breast mastectomy for right breast cancer. Patient recovered in the PACU, and was transferred to the floor. Patient had some post operative nausea, and had an episode of diaphoresis, left chest pain, and nausea. Labs and EKG revealed

## 2021-02-18 NOTE — CHART NOTE - NSCHARTNOTEFT_GEN_A_CORE
CBC shows acute anemia compared to 12/9/2020. Discussed with attending. Will repeat CBC 4 hrs later. Lovenox held. con't IVF. Will transfuse if H/H continues to decrease.

## 2021-02-18 NOTE — PROGRESS NOTE ADULT - SUBJECTIVE AND OBJECTIVE BOX
s/p bilateral mastectomy.  Post op, overnight, she had sanguinous drainage, total of 200cc with a Hct drop to 24%.  Pre-op Hct was 36, but 2 months ago.  She was likely dehydrated preop.  A drop in BP was noted with increase in HR to 110.  She was given one dose of Lovenox post op at 6pm yesterday, now 24 hours ago.    The patient has been having hot flashes many times throughout the day as well as nausea, even prior to surgery.  She states that has not changed.  Currently she is very hungry.    She has been given 2 units pRBC's with a post transfusion Hct of 27.6%.  Her BP is better, 108/66.  Her PT/PTT is acceptable.    On exam:  the left chest wall is swollen with some fluctuance c/w a hematoma, but it is soft and not tense and not tender.    The drainage outputs appear to be dark blood and are decreasing.

## 2021-02-18 NOTE — RAPID RESPONSE TEAM SUMMARY - NSSITUATIONBACKGROUNDRRT_GEN_ALL_CORE
RRT was called as the patient is not feeling good and sweating profusely. Pt seen and examined at the bedside. She complains of pain at the breasts. Pt denies chest pain, SOB, palpitations, abdominal pain. She received percocet yesterday at 10 pm. Vitals checked at the bedside and stable. Temp of 97, HR of 117, BP of 99/47, Saturating 100% on RA and RR 23. EKG normal sinus rhythm. Blood glucose is 256. Will give 1L NS. Will check labs like cbc, cmp, magnesium, phosphorus, d dimer. CXR was normal but will follow official read. Avoid Tylenol due to elevated LFTs and discontinued amlodipine due to soft BP.

## 2021-02-19 LAB
24R-OH-CALCIDIOL SERPL-MCNC: 20.7 NG/ML — LOW (ref 30–80)
ALBUMIN SERPL ELPH-MCNC: 1.7 G/DL — LOW (ref 3.5–5)
ALBUMIN SERPL ELPH-MCNC: 2 G/DL — LOW (ref 3.5–5)
ALBUMIN SERPL ELPH-MCNC: 2.2 G/DL — LOW (ref 3.5–5)
ALBUMIN SERPL ELPH-MCNC: 2.3 G/DL — LOW (ref 3.5–5)
ALP SERPL-CCNC: 120 U/L — SIGNIFICANT CHANGE UP (ref 40–120)
ALP SERPL-CCNC: 142 U/L — HIGH (ref 40–120)
ALP SERPL-CCNC: 299 U/L — HIGH (ref 40–120)
ALP SERPL-CCNC: 98 U/L — SIGNIFICANT CHANGE UP (ref 40–120)
ALT FLD-CCNC: 670 U/L DA — HIGH (ref 10–60)
ALT FLD-CCNC: 748 U/L DA — HIGH (ref 10–60)
ALT FLD-CCNC: 790 U/L DA — HIGH (ref 10–60)
ALT FLD-CCNC: 797 U/L DA — HIGH (ref 10–60)
ANION GAP SERPL CALC-SCNC: 20 MMOL/L — HIGH (ref 5–17)
ANION GAP SERPL CALC-SCNC: 24 MMOL/L — HIGH (ref 5–17)
ANION GAP SERPL CALC-SCNC: 25 MMOL/L — HIGH (ref 5–17)
ANION GAP SERPL CALC-SCNC: 25 MMOL/L — HIGH (ref 5–17)
APTT BLD: 33 SEC — SIGNIFICANT CHANGE UP (ref 27.5–35.5)
APTT BLD: 34.4 SEC — SIGNIFICANT CHANGE UP (ref 27.5–35.5)
APTT BLD: 37.2 SEC — HIGH (ref 27.5–35.5)
APTT BLD: 39.3 SEC — HIGH (ref 27.5–35.5)
APTT BLD: 47.6 SEC — HIGH (ref 27.5–35.5)
AST SERPL-CCNC: 1327 U/L — HIGH (ref 10–40)
AST SERPL-CCNC: 1448 U/L — HIGH (ref 10–40)
AST SERPL-CCNC: 1825 U/L — HIGH (ref 10–40)
AST SERPL-CCNC: 1960 U/L — HIGH (ref 10–40)
BASE EXCESS BLDA CALC-SCNC: -12.6 MMOL/L — LOW (ref -2–2)
BASE EXCESS BLDA CALC-SCNC: -15 MMOL/L — LOW (ref -2–2)
BASE EXCESS BLDA CALC-SCNC: -17.2 MMOL/L — LOW (ref -2–2)
BASE EXCESS BLDA CALC-SCNC: -19 MMOL/L — LOW (ref -2–2)
BASE EXCESS BLDA CALC-SCNC: -21.7 MMOL/L — LOW (ref -2–2)
BASE EXCESS BLDV CALC-SCNC: -18.5 MMOL/L — LOW (ref -2–2)
BASOPHILS # BLD AUTO: 0.02 K/UL — SIGNIFICANT CHANGE UP (ref 0–0.2)
BASOPHILS # BLD AUTO: 0.03 K/UL — SIGNIFICANT CHANGE UP (ref 0–0.2)
BASOPHILS NFR BLD AUTO: 0.4 % — SIGNIFICANT CHANGE UP (ref 0–2)
BASOPHILS NFR BLD AUTO: 0.5 % — SIGNIFICANT CHANGE UP (ref 0–2)
BILIRUB SERPL-MCNC: 0.6 MG/DL — SIGNIFICANT CHANGE UP (ref 0.2–1.2)
BILIRUB SERPL-MCNC: 0.8 MG/DL — SIGNIFICANT CHANGE UP (ref 0.2–1.2)
BILIRUB SERPL-MCNC: 1 MG/DL — SIGNIFICANT CHANGE UP (ref 0.2–1.2)
BILIRUB SERPL-MCNC: 1.6 MG/DL — HIGH (ref 0.2–1.2)
BLOOD GAS COMMENTS ARTERIAL: SIGNIFICANT CHANGE UP
BLOOD GAS COMMENTS, VENOUS: SIGNIFICANT CHANGE UP
BUN SERPL-MCNC: 19 MG/DL — HIGH (ref 7–18)
BUN SERPL-MCNC: 20 MG/DL — HIGH (ref 7–18)
BUN SERPL-MCNC: 20 MG/DL — HIGH (ref 7–18)
BUN SERPL-MCNC: 22 MG/DL — HIGH (ref 7–18)
CA-I BLD-SCNC: 0.97 MMOL/L — LOW (ref 1.12–1.3)
CALCIUM SERPL-MCNC: 6.8 MG/DL — LOW (ref 8.4–10.5)
CALCIUM SERPL-MCNC: 6.9 MG/DL — LOW (ref 8.4–10.5)
CALCIUM SERPL-MCNC: 7 MG/DL — LOW (ref 8.4–10.5)
CALCIUM SERPL-MCNC: 7.5 MG/DL — LOW (ref 8.4–10.5)
CALCIUM SERPL-MCNC: 8 MG/DL — LOW (ref 8.4–10.5)
CHLORIDE SERPL-SCNC: 105 MMOL/L — SIGNIFICANT CHANGE UP (ref 96–108)
CHLORIDE SERPL-SCNC: 106 MMOL/L — SIGNIFICANT CHANGE UP (ref 96–108)
CHLORIDE SERPL-SCNC: 106 MMOL/L — SIGNIFICANT CHANGE UP (ref 96–108)
CHLORIDE SERPL-SCNC: 111 MMOL/L — HIGH (ref 96–108)
CK MB BLD-MCNC: 1 % — SIGNIFICANT CHANGE UP (ref 0–3.5)
CK MB CFR SERPL CALC: 135.6 NG/ML — HIGH (ref 0–3.6)
CK MB CFR SERPL CALC: 202.2 NG/ML — HIGH (ref 0–3.6)
CK SERPL-CCNC: 5220 U/L — HIGH (ref 21–215)
CK SERPL-CCNC: CRITICAL HIGH U/L (ref 21–215)
CK SERPL-CCNC: CRITICAL HIGH U/L (ref 21–215)
CO2 SERPL-SCNC: 10 MMOL/L — CRITICAL LOW (ref 22–31)
CO2 SERPL-SCNC: 12 MMOL/L — LOW (ref 22–31)
CO2 SERPL-SCNC: 13 MMOL/L — LOW (ref 22–31)
CO2 SERPL-SCNC: 15 MMOL/L — LOW (ref 22–31)
CREAT SERPL-MCNC: 1.77 MG/DL — HIGH (ref 0.5–1.3)
CREAT SERPL-MCNC: 2.06 MG/DL — HIGH (ref 0.5–1.3)
CREAT SERPL-MCNC: 2.5 MG/DL — HIGH (ref 0.5–1.3)
CREAT SERPL-MCNC: 2.71 MG/DL — HIGH (ref 0.5–1.3)
CRP SERPL-MCNC: 0.49 MG/DL — HIGH (ref 0–0.4)
D DIMER BLD IA.RAPID-MCNC: 1629 NG/ML DDU — HIGH
D DIMER BLD IA.RAPID-MCNC: 2325 NG/ML DDU — HIGH
D DIMER BLD IA.RAPID-MCNC: 2348 NG/ML DDU — HIGH
EOSINOPHIL # BLD AUTO: 0 K/UL — SIGNIFICANT CHANGE UP (ref 0–0.5)
EOSINOPHIL # BLD AUTO: 0.01 K/UL — SIGNIFICANT CHANGE UP (ref 0–0.5)
EOSINOPHIL NFR BLD AUTO: 0 % — SIGNIFICANT CHANGE UP (ref 0–6)
EOSINOPHIL NFR BLD AUTO: 0.1 % — SIGNIFICANT CHANGE UP (ref 0–6)
ERYTHROCYTE [SEDIMENTATION RATE] IN BLOOD: 2 MM/HR — SIGNIFICANT CHANGE UP (ref 0–15)
FIBRINOGEN PPP-MCNC: 107 MG/DL — LOW (ref 290–520)
FIBRINOGEN PPP-MCNC: 215 MG/DL — LOW (ref 290–520)
FIBRINOGEN PPP-MCNC: 226 MG/DL — LOW (ref 290–520)
FIBRINOGEN PPP-MCNC: 259 MG/DL — LOW (ref 290–520)
FIBRINOGEN PPP-MCNC: 271 MG/DL — LOW (ref 290–520)
GLUCOSE BLDC GLUCOMTR-MCNC: 117 MG/DL — HIGH (ref 70–99)
GLUCOSE BLDC GLUCOMTR-MCNC: 160 MG/DL — HIGH (ref 70–99)
GLUCOSE SERPL-MCNC: 168 MG/DL — HIGH (ref 70–99)
GLUCOSE SERPL-MCNC: 206 MG/DL — HIGH (ref 70–99)
GLUCOSE SERPL-MCNC: 274 MG/DL — HIGH (ref 70–99)
GLUCOSE SERPL-MCNC: 302 MG/DL — HIGH (ref 70–99)
HCO3 BLDA-SCNC: 10 MMOL/L — LOW (ref 23–27)
HCO3 BLDA-SCNC: 11 MMOL/L — LOW (ref 23–27)
HCO3 BLDA-SCNC: 11 MMOL/L — LOW (ref 23–27)
HCO3 BLDA-SCNC: 12 MMOL/L — LOW (ref 23–27)
HCO3 BLDA-SCNC: 8 MMOL/L — LOW (ref 23–27)
HCO3 BLDV-SCNC: 13 MMOL/L — LOW (ref 21–29)
HCT VFR BLD CALC: 20.8 % — CRITICAL LOW (ref 34.5–45)
HCT VFR BLD CALC: 21.7 % — LOW (ref 34.5–45)
HCT VFR BLD CALC: 25.8 % — LOW (ref 34.5–45)
HCT VFR BLD CALC: 39.5 % — SIGNIFICANT CHANGE UP (ref 34.5–45)
HCT VFR BLD CALC: 42.4 % — SIGNIFICANT CHANGE UP (ref 34.5–45)
HGB BLD-MCNC: 12.5 G/DL — SIGNIFICANT CHANGE UP (ref 11.5–15.5)
HGB BLD-MCNC: 14 G/DL — SIGNIFICANT CHANGE UP (ref 11.5–15.5)
HGB BLD-MCNC: 6.9 G/DL — CRITICAL LOW (ref 11.5–15.5)
HGB BLD-MCNC: 6.9 G/DL — CRITICAL LOW (ref 11.5–15.5)
HGB BLD-MCNC: 7.9 G/DL — LOW (ref 11.5–15.5)
HOROWITZ INDEX BLDA+IHG-RTO: 100 — SIGNIFICANT CHANGE UP
HOROWITZ INDEX BLDA+IHG-RTO: 50 — SIGNIFICANT CHANGE UP
HOROWITZ INDEX BLDA+IHG-RTO: 60 — SIGNIFICANT CHANGE UP
HOROWITZ INDEX BLDA+IHG-RTO: 60 — SIGNIFICANT CHANGE UP
HOROWITZ INDEX BLDA+IHG-RTO: 70 — SIGNIFICANT CHANGE UP
HOROWITZ INDEX BLDV+IHG-RTO: 100 — SIGNIFICANT CHANGE UP
IMM GRANULOCYTES NFR BLD AUTO: 0.3 % — SIGNIFICANT CHANGE UP (ref 0–1.5)
IMM GRANULOCYTES NFR BLD AUTO: 0.6 % — SIGNIFICANT CHANGE UP (ref 0–1.5)
INR BLD: 1.5 RATIO — HIGH (ref 0.88–1.16)
INR BLD: 1.59 RATIO — HIGH (ref 0.88–1.16)
INR BLD: 1.64 RATIO — HIGH (ref 0.88–1.16)
INR BLD: 1.66 RATIO — HIGH (ref 0.88–1.16)
INR BLD: 1.71 RATIO — HIGH (ref 0.88–1.16)
LACTATE SERPL-SCNC: 13.4 MMOL/L — CRITICAL HIGH (ref 0.7–2)
LACTATE SERPL-SCNC: 14.5 MMOL/L — CRITICAL HIGH (ref 0.7–2)
LACTATE SERPL-SCNC: 17 MMOL/L — CRITICAL HIGH (ref 0.7–2)
LACTATE SERPL-SCNC: 17 MMOL/L — CRITICAL HIGH (ref 0.7–2)
LACTATE SERPL-SCNC: 17.6 MMOL/L — CRITICAL HIGH (ref 0.7–2)
LYMPHOCYTES # BLD AUTO: 0.72 K/UL — LOW (ref 1–3.3)
LYMPHOCYTES # BLD AUTO: 1.25 K/UL — SIGNIFICANT CHANGE UP (ref 1–3.3)
LYMPHOCYTES # BLD AUTO: 14.8 % — SIGNIFICANT CHANGE UP (ref 13–44)
LYMPHOCYTES # BLD AUTO: 18.3 % — SIGNIFICANT CHANGE UP (ref 13–44)
MAGNESIUM SERPL-MCNC: 1.4 MG/DL — LOW (ref 1.6–2.6)
MAGNESIUM SERPL-MCNC: 1.5 MG/DL — LOW (ref 1.6–2.6)
MAGNESIUM SERPL-MCNC: 1.8 MG/DL — SIGNIFICANT CHANGE UP (ref 1.6–2.6)
MCHC RBC-ENTMCNC: 28.8 PG — SIGNIFICANT CHANGE UP (ref 27–34)
MCHC RBC-ENTMCNC: 28.9 PG — SIGNIFICANT CHANGE UP (ref 27–34)
MCHC RBC-ENTMCNC: 29.1 PG — SIGNIFICANT CHANGE UP (ref 27–34)
MCHC RBC-ENTMCNC: 29.2 PG — SIGNIFICANT CHANGE UP (ref 27–34)
MCHC RBC-ENTMCNC: 29.9 PG — SIGNIFICANT CHANGE UP (ref 27–34)
MCHC RBC-ENTMCNC: 30.6 GM/DL — LOW (ref 32–36)
MCHC RBC-ENTMCNC: 31.6 GM/DL — LOW (ref 32–36)
MCHC RBC-ENTMCNC: 31.8 GM/DL — LOW (ref 32–36)
MCHC RBC-ENTMCNC: 33 GM/DL — SIGNIFICANT CHANGE UP (ref 32–36)
MCHC RBC-ENTMCNC: 33.2 GM/DL — SIGNIFICANT CHANGE UP (ref 32–36)
MCV RBC AUTO: 87.2 FL — SIGNIFICANT CHANGE UP (ref 80–100)
MCV RBC AUTO: 90 FL — SIGNIFICANT CHANGE UP (ref 80–100)
MCV RBC AUTO: 91.9 FL — SIGNIFICANT CHANGE UP (ref 80–100)
MCV RBC AUTO: 91.9 FL — SIGNIFICANT CHANGE UP (ref 80–100)
MCV RBC AUTO: 94.5 FL — SIGNIFICANT CHANGE UP (ref 80–100)
MONOCYTES # BLD AUTO: 0.12 K/UL — SIGNIFICANT CHANGE UP (ref 0–0.9)
MONOCYTES # BLD AUTO: 0.47 K/UL — SIGNIFICANT CHANGE UP (ref 0–0.9)
MONOCYTES NFR BLD AUTO: 3 % — SIGNIFICANT CHANGE UP (ref 2–14)
MONOCYTES NFR BLD AUTO: 5.6 % — SIGNIFICANT CHANGE UP (ref 2–14)
NEUTROPHILS # BLD AUTO: 3.07 K/UL — SIGNIFICANT CHANGE UP (ref 1.8–7.4)
NEUTROPHILS # BLD AUTO: 6.61 K/UL — SIGNIFICANT CHANGE UP (ref 1.8–7.4)
NEUTROPHILS NFR BLD AUTO: 77.9 % — HIGH (ref 43–77)
NEUTROPHILS NFR BLD AUTO: 78.5 % — HIGH (ref 43–77)
NRBC # BLD: 0 /100 WBCS — SIGNIFICANT CHANGE UP (ref 0–0)
PCO2 BLDA: 24 MMHG — LOW (ref 32–46)
PCO2 BLDA: 26 MMHG — LOW (ref 32–46)
PCO2 BLDA: 28 MMHG — LOW (ref 32–46)
PCO2 BLDA: 32 MMHG — SIGNIFICANT CHANGE UP (ref 32–46)
PCO2 BLDA: 40 MMHG — SIGNIFICANT CHANGE UP (ref 32–46)
PCO2 BLDV: 53 MMHG — HIGH (ref 35–50)
PH BLDA: 7.02 — CRITICAL LOW (ref 7.35–7.45)
PH BLDA: 7.06 — CRITICAL LOW (ref 7.35–7.45)
PH BLDA: 7.17 — CRITICAL LOW (ref 7.35–7.45)
PH BLDA: 7.24 — LOW (ref 7.35–7.45)
PH BLDA: 7.32 — LOW (ref 7.35–7.45)
PH BLDV: 7.01 — CRITICAL LOW (ref 7.35–7.45)
PHOSPHATE SERPL-MCNC: 2.6 MG/DL — SIGNIFICANT CHANGE UP (ref 2.5–4.5)
PHOSPHATE SERPL-MCNC: 5.9 MG/DL — HIGH (ref 2.5–4.5)
PHOSPHATE SERPL-MCNC: 9.1 MG/DL — HIGH (ref 2.5–4.5)
PLATELET # BLD AUTO: 117 K/UL — LOW (ref 150–400)
PLATELET # BLD AUTO: 125 K/UL — LOW (ref 150–400)
PLATELET # BLD AUTO: 142 K/UL — LOW (ref 150–400)
PLATELET # BLD AUTO: 171 K/UL — SIGNIFICANT CHANGE UP (ref 150–400)
PLATELET # BLD AUTO: 189 K/UL — SIGNIFICANT CHANGE UP (ref 150–400)
PO2 BLDA: 119 MMHG — HIGH (ref 74–108)
PO2 BLDA: 52 MMHG — LOW (ref 74–108)
PO2 BLDA: 88 MMHG — SIGNIFICANT CHANGE UP (ref 74–108)
PO2 BLDA: 89 MMHG — SIGNIFICANT CHANGE UP (ref 74–108)
PO2 BLDA: 97 MMHG — SIGNIFICANT CHANGE UP (ref 74–108)
PO2 BLDV: 49 MMHG — HIGH (ref 25–45)
POTASSIUM SERPL-MCNC: 3.2 MMOL/L — LOW (ref 3.5–5.3)
POTASSIUM SERPL-MCNC: 3.5 MMOL/L — SIGNIFICANT CHANGE UP (ref 3.5–5.3)
POTASSIUM SERPL-MCNC: 4.5 MMOL/L — SIGNIFICANT CHANGE UP (ref 3.5–5.3)
POTASSIUM SERPL-MCNC: 6 MMOL/L — HIGH (ref 3.5–5.3)
POTASSIUM SERPL-SCNC: 3.2 MMOL/L — LOW (ref 3.5–5.3)
POTASSIUM SERPL-SCNC: 3.5 MMOL/L — SIGNIFICANT CHANGE UP (ref 3.5–5.3)
POTASSIUM SERPL-SCNC: 4.5 MMOL/L — SIGNIFICANT CHANGE UP (ref 3.5–5.3)
POTASSIUM SERPL-SCNC: 6 MMOL/L — HIGH (ref 3.5–5.3)
PROCALCITONIN SERPL-MCNC: 0.39 NG/ML — HIGH (ref 0.02–0.1)
PROT SERPL-MCNC: 3.6 G/DL — LOW (ref 6–8.3)
PROT SERPL-MCNC: 4.1 G/DL — LOW (ref 6–8.3)
PROT SERPL-MCNC: 4.3 G/DL — LOW (ref 6–8.3)
PROT SERPL-MCNC: 4.6 G/DL — LOW (ref 6–8.3)
PROTHROM AB SERPL-ACNC: 17.5 SEC — HIGH (ref 10.6–13.6)
PROTHROM AB SERPL-ACNC: 18.5 SEC — HIGH (ref 10.6–13.6)
PROTHROM AB SERPL-ACNC: 19.1 SEC — HIGH (ref 10.6–13.6)
PROTHROM AB SERPL-ACNC: 19.3 SEC — HIGH (ref 10.6–13.6)
PROTHROM AB SERPL-ACNC: 19.8 SEC — HIGH (ref 10.6–13.6)
PTH-INTACT FLD-MCNC: 500 PG/ML — HIGH (ref 15–65)
RBC # BLD: 2.31 M/UL — LOW (ref 3.8–5.2)
RBC # BLD: 2.36 M/UL — LOW (ref 3.8–5.2)
RBC # BLD: 2.73 M/UL — LOW (ref 3.8–5.2)
RBC # BLD: 4.3 M/UL — SIGNIFICANT CHANGE UP (ref 3.8–5.2)
RBC # BLD: 4.86 M/UL — SIGNIFICANT CHANGE UP (ref 3.8–5.2)
RBC # FLD: 14 % — SIGNIFICANT CHANGE UP (ref 10.3–14.5)
RBC # FLD: 14.1 % — SIGNIFICANT CHANGE UP (ref 10.3–14.5)
RBC # FLD: 14.5 % — SIGNIFICANT CHANGE UP (ref 10.3–14.5)
RBC # FLD: 14.5 % — SIGNIFICANT CHANGE UP (ref 10.3–14.5)
RBC # FLD: 14.6 % — HIGH (ref 10.3–14.5)
SAO2 % BLDA: 90 % — LOW (ref 92–96)
SAO2 % BLDA: 95 % — SIGNIFICANT CHANGE UP (ref 92–96)
SAO2 % BLDA: 95 % — SIGNIFICANT CHANGE UP (ref 92–96)
SAO2 % BLDA: 96 % — SIGNIFICANT CHANGE UP (ref 92–96)
SAO2 % BLDA: 97 % — HIGH (ref 92–96)
SAO2 % BLDV: 78 % — SIGNIFICANT CHANGE UP (ref 67–88)
SODIUM SERPL-SCNC: 140 MMOL/L — SIGNIFICANT CHANGE UP (ref 135–145)
SODIUM SERPL-SCNC: 143 MMOL/L — SIGNIFICANT CHANGE UP (ref 135–145)
SODIUM SERPL-SCNC: 143 MMOL/L — SIGNIFICANT CHANGE UP (ref 135–145)
SODIUM SERPL-SCNC: 146 MMOL/L — HIGH (ref 135–145)
TROPONIN I SERPL-MCNC: 1.26 NG/ML — HIGH (ref 0–0.04)
TROPONIN I SERPL-MCNC: 2.12 NG/ML — HIGH (ref 0–0.04)
TROPONIN I SERPL-MCNC: 8.03 NG/ML — HIGH (ref 0–0.04)
TROPONIN I SERPL-MCNC: 9.6 NG/ML — HIGH (ref 0–0.04)
URATE SERPL-MCNC: 7.5 MG/DL — HIGH (ref 2.5–7)
URATE SERPL-MCNC: 7.7 MG/DL — HIGH (ref 2.5–7)
WBC # BLD: 3.94 K/UL — SIGNIFICANT CHANGE UP (ref 3.8–10.5)
WBC # BLD: 7.74 K/UL — SIGNIFICANT CHANGE UP (ref 3.8–10.5)
WBC # BLD: 8.09 K/UL — SIGNIFICANT CHANGE UP (ref 3.8–10.5)
WBC # BLD: 8.42 K/UL — SIGNIFICANT CHANGE UP (ref 3.8–10.5)
WBC # BLD: 9.79 K/UL — SIGNIFICANT CHANGE UP (ref 3.8–10.5)
WBC # FLD AUTO: 3.94 K/UL — SIGNIFICANT CHANGE UP (ref 3.8–10.5)
WBC # FLD AUTO: 7.74 K/UL — SIGNIFICANT CHANGE UP (ref 3.8–10.5)
WBC # FLD AUTO: 8.09 K/UL — SIGNIFICANT CHANGE UP (ref 3.8–10.5)
WBC # FLD AUTO: 8.42 K/UL — SIGNIFICANT CHANGE UP (ref 3.8–10.5)
WBC # FLD AUTO: 9.79 K/UL — SIGNIFICANT CHANGE UP (ref 3.8–10.5)

## 2021-02-19 PROCEDURE — 71045 X-RAY EXAM CHEST 1 VIEW: CPT | Mod: 26

## 2021-02-19 PROCEDURE — 95819 EEG AWAKE AND ASLEEP: CPT | Mod: 26

## 2021-02-19 RX ORDER — NOREPINEPHRINE BITARTRATE/D5W 8 MG/250ML
0.05 PLASTIC BAG, INJECTION (ML) INTRAVENOUS
Qty: 16 | Refills: 0 | Status: DISCONTINUED | OUTPATIENT
Start: 2021-02-19 | End: 2021-02-19

## 2021-02-19 RX ORDER — PIPERACILLIN AND TAZOBACTAM 4; .5 G/20ML; G/20ML
3.38 INJECTION, POWDER, LYOPHILIZED, FOR SOLUTION INTRAVENOUS ONCE
Refills: 0 | Status: COMPLETED | OUTPATIENT
Start: 2021-02-19 | End: 2021-02-19

## 2021-02-19 RX ORDER — PIPERACILLIN AND TAZOBACTAM 4; .5 G/20ML; G/20ML
3.38 INJECTION, POWDER, LYOPHILIZED, FOR SOLUTION INTRAVENOUS EVERY 12 HOURS
Refills: 0 | Status: DISCONTINUED | OUTPATIENT
Start: 2021-02-19 | End: 2021-02-22

## 2021-02-19 RX ORDER — SODIUM BICARBONATE 1 MEQ/ML
50 SYRINGE (ML) INTRAVENOUS ONCE
Refills: 0 | Status: COMPLETED | OUTPATIENT
Start: 2021-02-19 | End: 2021-02-19

## 2021-02-19 RX ORDER — CALCIUM GLUCONATE 100 MG/ML
1 VIAL (ML) INTRAVENOUS ONCE
Refills: 0 | Status: COMPLETED | OUTPATIENT
Start: 2021-02-19 | End: 2021-02-19

## 2021-02-19 RX ORDER — SODIUM BICARBONATE 1 MEQ/ML
0.09 SYRINGE (ML) INTRAVENOUS
Qty: 150 | Refills: 0 | Status: DISCONTINUED | OUTPATIENT
Start: 2021-02-19 | End: 2021-02-20

## 2021-02-19 RX ORDER — VANCOMYCIN HCL 1 G
VIAL (EA) INTRAVENOUS
Refills: 0 | Status: DISCONTINUED | OUTPATIENT
Start: 2021-02-19 | End: 2021-02-22

## 2021-02-19 RX ORDER — NOREPINEPHRINE BITARTRATE/D5W 8 MG/250ML
1.5 PLASTIC BAG, INJECTION (ML) INTRAVENOUS
Qty: 32 | Refills: 0 | Status: DISCONTINUED | OUTPATIENT
Start: 2021-02-19 | End: 2021-02-19

## 2021-02-19 RX ORDER — PROTHROMBIN COMPLEX CONCENTRATE (HUMAN) 25.5; 16.5; 24; 22; 22; 26 [IU]/ML; [IU]/ML; [IU]/ML; [IU]/ML; [IU]/ML; [IU]/ML
2000 POWDER, FOR SOLUTION INTRAVENOUS ONCE
Refills: 0 | Status: DISCONTINUED | OUTPATIENT
Start: 2021-02-19 | End: 2021-02-19

## 2021-02-19 RX ORDER — PANTOPRAZOLE SODIUM 20 MG/1
40 TABLET, DELAYED RELEASE ORAL DAILY
Refills: 0 | Status: DISCONTINUED | OUTPATIENT
Start: 2021-02-20 | End: 2021-02-20

## 2021-02-19 RX ORDER — VANCOMYCIN HCL 1 G
1000 VIAL (EA) INTRAVENOUS ONCE
Refills: 0 | Status: COMPLETED | OUTPATIENT
Start: 2021-02-19 | End: 2021-02-19

## 2021-02-19 RX ORDER — SODIUM BICARBONATE 1 MEQ/ML
0.15 SYRINGE (ML) INTRAVENOUS
Qty: 150 | Refills: 0 | Status: DISCONTINUED | OUTPATIENT
Start: 2021-02-19 | End: 2021-02-19

## 2021-02-19 RX ORDER — DEXTROSE 50 % IN WATER 50 %
50 SYRINGE (ML) INTRAVENOUS ONCE
Refills: 0 | Status: COMPLETED | OUTPATIENT
Start: 2021-02-19 | End: 2021-02-19

## 2021-02-19 RX ORDER — SODIUM BICARBONATE 1 MEQ/ML
0.26 SYRINGE (ML) INTRAVENOUS
Qty: 150 | Refills: 0 | Status: DISCONTINUED | OUTPATIENT
Start: 2021-02-19 | End: 2021-02-19

## 2021-02-19 RX ORDER — PANTOPRAZOLE SODIUM 20 MG/1
8 TABLET, DELAYED RELEASE ORAL
Qty: 80 | Refills: 0 | Status: DISCONTINUED | OUTPATIENT
Start: 2021-02-19 | End: 2021-02-19

## 2021-02-19 RX ORDER — MAGNESIUM SULFATE 500 MG/ML
2 VIAL (ML) INJECTION ONCE
Refills: 0 | Status: DISCONTINUED | OUTPATIENT
Start: 2021-02-19 | End: 2021-02-19

## 2021-02-19 RX ORDER — VANCOMYCIN HCL 1 G
1000 VIAL (EA) INTRAVENOUS EVERY 24 HOURS
Refills: 0 | Status: DISCONTINUED | OUTPATIENT
Start: 2021-02-20 | End: 2021-02-22

## 2021-02-19 RX ORDER — SODIUM CHLORIDE 9 MG/ML
500 INJECTION INTRAMUSCULAR; INTRAVENOUS; SUBCUTANEOUS ONCE
Refills: 0 | Status: COMPLETED | OUTPATIENT
Start: 2021-02-19 | End: 2021-02-19

## 2021-02-19 RX ORDER — MAGNESIUM SULFATE 500 MG/ML
1 VIAL (ML) INJECTION ONCE
Refills: 0 | Status: COMPLETED | OUTPATIENT
Start: 2021-02-19 | End: 2021-02-19

## 2021-02-19 RX ORDER — NOREPINEPHRINE BITARTRATE/D5W 8 MG/250ML
1.5 PLASTIC BAG, INJECTION (ML) INTRAVENOUS
Qty: 32 | Refills: 0 | Status: DISCONTINUED | OUTPATIENT
Start: 2021-02-19 | End: 2021-02-20

## 2021-02-19 RX ORDER — SODIUM BICARBONATE 1 MEQ/ML
0.21 SYRINGE (ML) INTRAVENOUS
Qty: 150 | Refills: 0 | Status: DISCONTINUED | OUTPATIENT
Start: 2021-02-19 | End: 2021-02-19

## 2021-02-19 RX ORDER — INSULIN HUMAN 100 [IU]/ML
10 INJECTION, SOLUTION SUBCUTANEOUS ONCE
Refills: 0 | Status: COMPLETED | OUTPATIENT
Start: 2021-02-19 | End: 2021-02-19

## 2021-02-19 RX ORDER — SODIUM BICARBONATE 1 MEQ/ML
50 SYRINGE (ML) INTRAVENOUS
Refills: 0 | Status: COMPLETED | OUTPATIENT
Start: 2021-02-19 | End: 2021-02-19

## 2021-02-19 RX ORDER — SODIUM CHLORIDE 9 MG/ML
2000 INJECTION INTRAMUSCULAR; INTRAVENOUS; SUBCUTANEOUS ONCE
Refills: 0 | Status: COMPLETED | OUTPATIENT
Start: 2021-02-19 | End: 2021-02-19

## 2021-02-19 RX ORDER — NOREPINEPHRINE BITARTRATE/D5W 8 MG/250ML
1.9 PLASTIC BAG, INJECTION (ML) INTRAVENOUS
Qty: 32 | Refills: 0 | Status: DISCONTINUED | OUTPATIENT
Start: 2021-02-19 | End: 2021-02-19

## 2021-02-19 RX ADMIN — SODIUM CHLORIDE 1000 MILLILITER(S): 9 INJECTION INTRAMUSCULAR; INTRAVENOUS; SUBCUTANEOUS at 05:25

## 2021-02-19 RX ADMIN — Medication 100 MILLIGRAM(S): at 02:15

## 2021-02-19 RX ADMIN — Medication 100 MEQ/KG/HR: at 01:29

## 2021-02-19 RX ADMIN — Medication 250 MILLIGRAM(S): at 18:14

## 2021-02-19 RX ADMIN — Medication 50 MILLIEQUIVALENT(S): at 00:47

## 2021-02-19 RX ADMIN — Medication 75 MEQ/KG/HR: at 02:19

## 2021-02-19 RX ADMIN — Medication 125 MEQ/KG/HR: at 14:04

## 2021-02-19 RX ADMIN — CHLORHEXIDINE GLUCONATE 15 MILLILITER(S): 213 SOLUTION TOPICAL at 06:52

## 2021-02-19 RX ADMIN — Medication 3.4 MICROGRAM(S)/KG/MIN: at 08:04

## 2021-02-19 RX ADMIN — Medication 50 MEQ/KG/HR: at 18:50

## 2021-02-19 RX ADMIN — Medication 100 GRAM(S): at 14:02

## 2021-02-19 RX ADMIN — Medication 50 MILLIEQUIVALENT(S): at 18:14

## 2021-02-19 RX ADMIN — Medication 50 MILLILITER(S): at 01:46

## 2021-02-19 RX ADMIN — Medication 50 MILLIEQUIVALENT(S): at 00:48

## 2021-02-19 RX ADMIN — Medication 3.4 MICROGRAM(S)/KG/MIN: at 10:00

## 2021-02-19 RX ADMIN — Medication 100 MILLIGRAM(S): at 07:18

## 2021-02-19 RX ADMIN — Medication 100 MILLIGRAM(S): at 13:08

## 2021-02-19 RX ADMIN — PIPERACILLIN AND TAZOBACTAM 200 GRAM(S): 4; .5 INJECTION, POWDER, LYOPHILIZED, FOR SOLUTION INTRAVENOUS at 18:13

## 2021-02-19 RX ADMIN — INSULIN HUMAN 10 UNIT(S): 100 INJECTION, SOLUTION SUBCUTANEOUS at 01:45

## 2021-02-19 RX ADMIN — CHLORHEXIDINE GLUCONATE 1 APPLICATION(S): 213 SOLUTION TOPICAL at 06:50

## 2021-02-19 RX ADMIN — Medication 3.4 MICROGRAM(S)/KG/MIN: at 12:00

## 2021-02-19 RX ADMIN — Medication 56 MICROGRAM(S)/KG/MIN: at 19:10

## 2021-02-19 RX ADMIN — Medication 100 MEQ/KG/HR: at 09:40

## 2021-02-19 RX ADMIN — Medication 200 GRAM(S): at 01:46

## 2021-02-19 RX ADMIN — PANTOPRAZOLE SODIUM 40 MILLIGRAM(S): 20 TABLET, DELAYED RELEASE ORAL at 11:13

## 2021-02-19 RX ADMIN — SODIUM CHLORIDE 1000 MILLILITER(S): 9 INJECTION INTRAMUSCULAR; INTRAVENOUS; SUBCUTANEOUS at 13:08

## 2021-02-19 NOTE — PROGRESS NOTE ADULT - ASSESSMENT
45F in critical condition after bleeding at mastectomy site  - continue ICU level of care  - monitor vitals, wean pressors as appropriate  - continue vent support  - NPO, continue aggressive fluid resuscitation  - place NG, IV GI PPX  - monitor urine output  - monitor electrolytes  - monitor I's and O's, close monitor of drain output  - continue bicarb, follow up abg for pH  - continued transfusion for low hb, match with FFP and PLT

## 2021-02-19 NOTE — PROGRESS NOTE ADULT - SUBJECTIVE AND OBJECTIVE BOX
Patient is a 45y old  Female who presents with a chief complaint of Mastectomy (19 Feb 2021 10:51)      INTERVAL HPI/OVERNIGHT EVENTS:      ICU Vital Signs Last 24 Hrs  T(C): 36.9 (19 Feb 2021 13:00), Max: 37.1 (18 Feb 2021 14:55)  T(F): 98.4 (19 Feb 2021 13:00), Max: 98.7 (18 Feb 2021 14:55)  HR: 139 (19 Feb 2021 13:00) (90 - 141)  BP: 98/60 (19 Feb 2021 13:00) (73/46 - 169/89)  BP(mean): 69 (19 Feb 2021 13:00) (53 - 108)  ABP: --  ABP(mean): --  RR: 28 (19 Feb 2021 13:00) (0 - 28)  SpO2: 100% (19 Feb 2021 13:00) (91% - 100%)    I&O's Summary    18 Feb 2021 07:01  -  19 Feb 2021 07:00  --------------------------------------------------------  IN: 4776.5 mL / OUT: 823 mL / NET: 3953.5 mL    19 Feb 2021 07:01  -  19 Feb 2021 13:51  --------------------------------------------------------  IN: 4170 mL / OUT: 90 mL / NET: 4080 mL      Mode: AC/ CMV (Assist Control/ Continuous Mandatory Ventilation)  RR (machine): 28  TV (machine): 450  FiO2: 60  PEEP: 5  ITime: 1  MAP: 11  PIP: 25      LABS:                        6.9    8.09  )-----------( 189      ( 19 Feb 2021 11:46 )             20.8     02-19    143  |  106  |  22<H>  ----------------------------<  206<H>  3.2<L>   |  13<L>  |  2.71<H>    Ca    7.0<L>      19 Feb 2021 11:46  Phos  2.6     02-19  Mg     1.4     02-19    TPro  4.3<L>  /  Alb  2.2<L>  /  TBili  1.0  /  DBili  x   /  AST  1960<H>  /  ALT  797<H>  /  AlkPhos  120  02-19    PT/INR - ( 19 Feb 2021 11:46 )   PT: 18.5 sec;   INR: 1.59 ratio         PTT - ( 19 Feb 2021 11:46 )  PTT:33.0 sec    CAPILLARY BLOOD GLUCOSE      POCT Blood Glucose.: 325 mg/dL (18 Feb 2021 23:58)  POCT Blood Glucose.: 360 mg/dL (18 Feb 2021 21:25)  POCT Blood Glucose.: 165 mg/dL (18 Feb 2021 19:54)    ABG - ( 19 Feb 2021 10:28 )  pH, Arterial: 7.24  pH, Blood: x     /  pCO2: 26    /  pO2: 97    / HCO3: 11    / Base Excess: -15.0 /  SaO2: 97                  RADIOLOGY & ADDITIONAL TESTS:    Consultant(s) Notes Reviewed:  [x ] YES  [ ] NO    MEDICATIONS  (STANDING):  ceFAZolin   IVPB      ceFAZolin   IVPB 1000 milliGRAM(s) IV Intermittent every 8 hours  chlorhexidine 0.12% Liquid 15 milliLiter(s) Oral Mucosa every 12 hours  chlorhexidine 2% Cloths 1 Application(s) Topical <User Schedule>  magnesium sulfate  IVPB 1 Gram(s) IV Intermittent once  norepinephrine Infusion 1.9 MICROgram(s)/kG/Min (148 mL/Hr) IV Continuous <Continuous>  pantoprazole  Injectable 40 milliGRAM(s) IV Push daily  sodium bicarbonate  Infusion 0.258 mEq/kG/Hr (125 mL/Hr) IV Continuous <Continuous>    MEDICATIONS  (PRN):      PHYSICAL EXAM:  GENERAL: well built, well nourished  HEAD:  Atraumatic, Normocephalic  EYES: EOMI, PERRLA, conjunctiva and sclera clear  ENT: No tonsillar erythema, exudates, or enlargement; Moist mucous membranes, Good dentition, No lesions  NECK: Supple, No JVD, Normal thyroid, no enlarged nodes  NERVOUS SYSTEM:  Alert & Oriented X3, Good concentration; Motor Strength 5/5 B/L upper and lower extremities; DTRs 2+ intact and symmetric, sensory intact  CHEST/LUNG: B/L good air entry; No rales, rhonchi, or wheezing  HEART: S1S2 normal, no S3, Regular rate and rhythm; No murmurs, rubs, or gallops  ABDOMEN: Soft, Nontender, Nondistended; Bowel sounds present  EXTREMITIES:  2+ Peripheral Pulses, No clubbing, cyanosis, or edema  LYMPH: No lymphadenopathy noted  SKIN: No rashes or lesions    Care Discussed with Consultants/Other Providers [ x] YES  [ ] NO Patient is a 45y old  Female who presents with a chief complaint of Mastectomy (19 Feb 2021 10:51)      INTERVAL HPI/OVERNIGHT EVENTS:   patient admitted to ICU after cardiac arrest. patient with PMHx of DVT of left IJ, breast Ca, s/p bilateral masectomy 2/17 had apparent syncopal episode last night and without pulse, hence code blue called. patient coded for 8 minutes before return of ROSC. patient then went to OR for ligation of two bleeding vessels. As of this AM, patient received 9pRBC, 6units plasma, and 2 units platelet, as well as cryoprecipitate 10units. As of 3:06PM patient obtained another 2u pRBC, 2plasma and 1 unit platelet. Seen by surgery, patient to return to OR for continued bleeding in the breasts.      ICU Vital Signs Last 24 Hrs  T(C): 36.9 (19 Feb 2021 13:00), Max: 37.1 (18 Feb 2021 14:55)  T(F): 98.4 (19 Feb 2021 13:00), Max: 98.7 (18 Feb 2021 14:55)  HR: 139 (19 Feb 2021 13:00) (90 - 141)  BP: 98/60 (19 Feb 2021 13:00) (73/46 - 169/89)  BP(mean): 69 (19 Feb 2021 13:00) (53 - 108)  ABP: --  ABP(mean): --  RR: 28 (19 Feb 2021 13:00) (0 - 28)  SpO2: 100% (19 Feb 2021 13:00) (91% - 100%)    I&O's Summary    18 Feb 2021 07:01  -  19 Feb 2021 07:00  --------------------------------------------------------  IN: 4776.5 mL / OUT: 823 mL / NET: 3953.5 mL    19 Feb 2021 07:01  -  19 Feb 2021 13:51  --------------------------------------------------------  IN: 4170 mL / OUT: 90 mL / NET: 4080 mL      Mode: AC/ CMV (Assist Control/ Continuous Mandatory Ventilation)  RR (machine): 28  TV (machine): 450  FiO2: 60  PEEP: 5  ITime: 1  MAP: 11  PIP: 25      LABS:                        6.9    8.09  )-----------( 189      ( 19 Feb 2021 11:46 )             20.8     02-19    143  |  106  |  22<H>  ----------------------------<  206<H>  3.2<L>   |  13<L>  |  2.71<H>    Ca    7.0<L>      19 Feb 2021 11:46  Phos  2.6     02-19  Mg     1.4     02-19    TPro  4.3<L>  /  Alb  2.2<L>  /  TBili  1.0  /  DBili  x   /  AST  1960<H>  /  ALT  797<H>  /  AlkPhos  120  02-19    PT/INR - ( 19 Feb 2021 11:46 )   PT: 18.5 sec;   INR: 1.59 ratio         PTT - ( 19 Feb 2021 11:46 )  PTT:33.0 sec    CAPILLARY BLOOD GLUCOSE      POCT Blood Glucose.: 325 mg/dL (18 Feb 2021 23:58)  POCT Blood Glucose.: 360 mg/dL (18 Feb 2021 21:25)  POCT Blood Glucose.: 165 mg/dL (18 Feb 2021 19:54)    ABG - ( 19 Feb 2021 10:28 )  pH, Arterial: 7.24  pH, Blood: x     /  pCO2: 26    /  pO2: 97    / HCO3: 11    / Base Excess: -15.0 /  SaO2: 97                  RADIOLOGY & ADDITIONAL TESTS:    Consultant(s) Notes Reviewed:  [x ] YES  [ ] NO    MEDICATIONS  (STANDING):  ceFAZolin   IVPB      ceFAZolin   IVPB 1000 milliGRAM(s) IV Intermittent every 8 hours  chlorhexidine 0.12% Liquid 15 milliLiter(s) Oral Mucosa every 12 hours  chlorhexidine 2% Cloths 1 Application(s) Topical <User Schedule>  magnesium sulfate  IVPB 1 Gram(s) IV Intermittent once  norepinephrine Infusion 1.9 MICROgram(s)/kG/Min (148 mL/Hr) IV Continuous <Continuous>  pantoprazole  Injectable 40 milliGRAM(s) IV Push daily  sodium bicarbonate  Infusion 0.258 mEq/kG/Hr (125 mL/Hr) IV Continuous <Continuous>    MEDICATIONS  (PRN):      PHYSICAL EXAM:  GENERAL: Moderate obese F intubated and unresponsive   HEAD:  Atraumatic, Normocephalic  EYES: Dilated    ENMT: ETT in place   NECK: Supple, No JVD, Normal thyroid  NERVOUS SYSTEM: Unresponsive without any sedation   CHEST/LUNG: clear to auscultate b/l  No rales, rhonchi, wheezing, or rubs. dressing in place with active oozing from the ALEJANDRO drains  HEART: Regular rate and rhythm; No murmurs, rubs, or gallops  ABDOMEN: Soft,  Nondistended; Bowel sounds present  EXTREMITIES:  2+ Peripheral Pulses, No clubbing, cyanosis, or edema  LYMPH: No lymphadenopathy noted  SKIN: No rashes or lesions      Care Discussed with Consultants/Other Providers [ x] YES  [ ] NO Patient is a 45y old  Female who presents with a chief complaint of Mastectomy (19 Feb 2021 10:51)      INTERVAL HPI/OVERNIGHT EVENTS:   patient admitted to ICU after cardiac arrest. patient with PMHx of DVT of left IJ, breast Ca, s/p bilateral masectomy 2/17 had apparent syncopal episode last night and without pulse, hence code blue called. patient coded for 8 minutes before return of ROSC. patient then went to OR for ligation of two bleeding vessels. As of this AM, patient received 9pRBC, 6units plasma, and 2 units platelet, as well as cryoprecipitate 10units. As of 3:06PM patient obtained another 2u pRBC, 2plasma and 1 unit platelet. Seen by surgery, patient to return to OR for continued bleeding in the breasts.      ICU Vital Signs Last 24 Hrs  T(C): 36.9 (19 Feb 2021 13:00), Max: 37.1 (18 Feb 2021 14:55)  T(F): 98.4 (19 Feb 2021 13:00), Max: 98.7 (18 Feb 2021 14:55)  HR: 139 (19 Feb 2021 13:00) (90 - 141)  BP: 98/60 (19 Feb 2021 13:00) (73/46 - 169/89)  BP(mean): 69 (19 Feb 2021 13:00) (53 - 108)  ABP: --  ABP(mean): --  RR: 28 (19 Feb 2021 13:00) (0 - 28)  SpO2: 100% (19 Feb 2021 13:00) (91% - 100%)    I&O's Summary    18 Feb 2021 07:01  -  19 Feb 2021 07:00  --------------------------------------------------------  IN: 4776.5 mL / OUT: 823 mL / NET: 3953.5 mL    19 Feb 2021 07:01  -  19 Feb 2021 13:51  --------------------------------------------------------  IN: 4170 mL / OUT: 90 mL / NET: 4080 mL      Mode: AC/ CMV (Assist Control/ Continuous Mandatory Ventilation)  RR (machine): 28  TV (machine): 450  FiO2: 60  PEEP: 5  ITime: 1  MAP: 11  PIP: 25      LABS:                        6.9    8.09  )-----------( 189      ( 19 Feb 2021 11:46 )             20.8     02-19    143  |  106  |  22<H>  ----------------------------<  206<H>  3.2<L>   |  13<L>  |  2.71<H>    Ca    7.0<L>      19 Feb 2021 11:46  Phos  2.6     02-19  Mg     1.4     02-19    TPro  4.3<L>  /  Alb  2.2<L>  /  TBili  1.0  /  DBili  x   /  AST  1960<H>  /  ALT  797<H>  /  AlkPhos  120  02-19    PT/INR - ( 19 Feb 2021 11:46 )   PT: 18.5 sec;   INR: 1.59 ratio         PTT - ( 19 Feb 2021 11:46 )  PTT:33.0 sec    CAPILLARY BLOOD GLUCOSE      POCT Blood Glucose.: 325 mg/dL (18 Feb 2021 23:58)  POCT Blood Glucose.: 360 mg/dL (18 Feb 2021 21:25)  POCT Blood Glucose.: 165 mg/dL (18 Feb 2021 19:54)    ABG - ( 19 Feb 2021 10:28 )  pH, Arterial: 7.24  pH, Blood: x     /  pCO2: 26    /  pO2: 97    / HCO3: 11    / Base Excess: -15.0 /  SaO2: 97                  RADIOLOGY & ADDITIONAL TESTS:    Consultant(s) Notes Reviewed:  [x ] YES  [ ] NO    MEDICATIONS  (STANDING):  ceFAZolin   IVPB      ceFAZolin   IVPB 1000 milliGRAM(s) IV Intermittent every 8 hours  chlorhexidine 0.12% Liquid 15 milliLiter(s) Oral Mucosa every 12 hours  chlorhexidine 2% Cloths 1 Application(s) Topical <User Schedule>  magnesium sulfate  IVPB 1 Gram(s) IV Intermittent once  norepinephrine Infusion 1.9 MICROgram(s)/kG/Min (148 mL/Hr) IV Continuous <Continuous>  pantoprazole  Injectable 40 milliGRAM(s) IV Push daily  sodium bicarbonate  Infusion 0.258 mEq/kG/Hr (125 mL/Hr) IV Continuous <Continuous>    MEDICATIONS  (PRN):      PHYSICAL EXAM:  GENERAL: Moderate obese F intubated and unresponsive   HEAD:  Atraumatic, Normocephalic  EYES: Dilated    ENMT: ETT in place   NECK: Supple, No JVD, Normal thyroid  NERVOUS SYSTEM: No sedation and unarousable despite physical stimulation, Pupils fixed.   CHEST/LUNG: clear to auscultate b/l  No rales, rhonchi, wheezing, or rubs. dressing in place with active oozing from the ALEJANDRO drains  HEART: s1/s2 tachycardic  ABDOMEN: Soft,  Nondistended; Bowel sounds present  EXTREMITIES:  2+ Peripheral Pulses, No clubbing, cyanosis, or edema  LYMPH: No lymphadenopathy noted  SKIN: No rashes or lesions      Care Discussed with Consultants/Other Providers [ x] YES  [ ] NO Patient is a 45y old  Female who presents with a chief complaint of Mastectomy (19 Feb 2021 10:51)      INTERVAL HPI/OVERNIGHT EVENTS:   Patient admitted to ICU after cardiac arrest. Patient with PMHx of DVT of left IJ, breast Ca, s/p bilateral masectomy 2/17 had apparent syncopal episode last night in which rapid response was called. patient without palpable pulse, hence code blue called. Patient coded for 8 minutes before return of ROSC. Patient then went to OR for ligation of two bleeding vessels in the left breast. As of this AM, patient received 9pRBC, 6units plasma, and 2 units platelet, as well as cryoprecipitate 10units. As of 3:06PM patient obtained another 2u pRBC, 2plasma and 1 unit platelet. Seen by surgery for bedside wound exploration, in which code fusion was called and patient taken to OR for emergent surgery.      ICU Vital Signs Last 24 Hrs  T(C): 36.9 (19 Feb 2021 13:00), Max: 37.1 (18 Feb 2021 14:55)  T(F): 98.4 (19 Feb 2021 13:00), Max: 98.7 (18 Feb 2021 14:55)  HR: 139 (19 Feb 2021 13:00) (90 - 141)  BP: 98/60 (19 Feb 2021 13:00) (73/46 - 169/89)  BP(mean): 69 (19 Feb 2021 13:00) (53 - 108)  ABP: --  ABP(mean): --  RR: 28 (19 Feb 2021 13:00) (0 - 28)  SpO2: 100% (19 Feb 2021 13:00) (91% - 100%)    I&O's Summary    18 Feb 2021 07:01  -  19 Feb 2021 07:00  --------------------------------------------------------  IN: 4776.5 mL / OUT: 823 mL / NET: 3953.5 mL    19 Feb 2021 07:01  -  19 Feb 2021 13:51  --------------------------------------------------------  IN: 4170 mL / OUT: 90 mL / NET: 4080 mL      Mode: AC/ CMV (Assist Control/ Continuous Mandatory Ventilation)  RR (machine): 28  TV (machine): 450  FiO2: 60  PEEP: 5  ITime: 1  MAP: 11  PIP: 25      LABS:                        6.9    8.09  )-----------( 189      ( 19 Feb 2021 11:46 )             20.8     02-19    143  |  106  |  22<H>  ----------------------------<  206<H>  3.2<L>   |  13<L>  |  2.71<H>    Ca    7.0<L>      19 Feb 2021 11:46  Phos  2.6     02-19  Mg     1.4     02-19    TPro  4.3<L>  /  Alb  2.2<L>  /  TBili  1.0  /  DBili  x   /  AST  1960<H>  /  ALT  797<H>  /  AlkPhos  120  02-19    PT/INR - ( 19 Feb 2021 11:46 )   PT: 18.5 sec;   INR: 1.59 ratio         PTT - ( 19 Feb 2021 11:46 )  PTT:33.0 sec    CAPILLARY BLOOD GLUCOSE      POCT Blood Glucose.: 325 mg/dL (18 Feb 2021 23:58)  POCT Blood Glucose.: 360 mg/dL (18 Feb 2021 21:25)  POCT Blood Glucose.: 165 mg/dL (18 Feb 2021 19:54)    ABG - ( 19 Feb 2021 10:28 )  pH, Arterial: 7.24  pH, Blood: x     /  pCO2: 26    /  pO2: 97    / HCO3: 11    / Base Excess: -15.0 /  SaO2: 97                  RADIOLOGY & ADDITIONAL TESTS:    Consultant(s) Notes Reviewed:  [x ] YES  [ ] NO    MEDICATIONS  (STANDING):  ceFAZolin   IVPB      ceFAZolin   IVPB 1000 milliGRAM(s) IV Intermittent every 8 hours  chlorhexidine 0.12% Liquid 15 milliLiter(s) Oral Mucosa every 12 hours  chlorhexidine 2% Cloths 1 Application(s) Topical <User Schedule>  magnesium sulfate  IVPB 1 Gram(s) IV Intermittent once  norepinephrine Infusion 1.9 MICROgram(s)/kG/Min (148 mL/Hr) IV Continuous <Continuous>  pantoprazole  Injectable 40 milliGRAM(s) IV Push daily  sodium bicarbonate  Infusion 0.258 mEq/kG/Hr (125 mL/Hr) IV Continuous <Continuous>    MEDICATIONS  (PRN):      PHYSICAL EXAM:  GENERAL: Moderate obese F intubated and unresponsive   HEAD:  Atraumatic, Normocephalic  EYES: Dilated, not responsive to light    ENMT: ETT in place   NERVOUS SYSTEM: No sedation and unarousable despite physical stimulation, Pupils fixed and non responsive   CHEST/LUNG: clear to auscultate b/l  No rales, rhonchi, wheezing, or rubs. Dressing in place with active oozing from the ALEJANDRO drains. Barehugger in place  : Koehler in place with minimal urine  HEART: s1/s2 tachycardic  ABDOMEN: Soft,  Nondistended; Bowel sounds present  SKIN: bleeding from femoral CVC      Care Discussed with Consultants/Other Providers [ x] YES  [ ] NO Patient is a 45y old  Female who presents with a chief complaint of Mastectomy (19 Feb 2021 10:51)      INTERVAL HPI/OVERNIGHT EVENTS:   Patient admitted to ICU after cardiac arrest. Patient with PMHx of DVT of left IJ, breast Ca, s/p bilateral masectomy 2/17 had apparent syncopal episode last night in which rapid response was called. patient without palpable pulse, hence code blue called. Patient coded for 8 minutes before return of ROSC. Patient then went to OR for ligation of two bleeding vessels in the left breast. As of this AM, patient received 9pRBC, 6units plasma, and 2 units platelet, as well as cryoprecipitate 10units. As of 3:06PM patient obtained another 2u pRBC, 2plasma and 1 unit platelet. Seen by surgery for bedside wound exploration, in which code fusion was called and patient taken to OR for emergent surgery.      ICU Vital Signs Last 24 Hrs  T(C): 36.9 (19 Feb 2021 13:00), Max: 37.1 (18 Feb 2021 14:55)  T(F): 98.4 (19 Feb 2021 13:00), Max: 98.7 (18 Feb 2021 14:55)  HR: 139 (19 Feb 2021 13:00) (90 - 141)  BP: 98/60 (19 Feb 2021 13:00) (73/46 - 169/89)  BP(mean): 69 (19 Feb 2021 13:00) (53 - 108)  ABP: --  ABP(mean): --  RR: 28 (19 Feb 2021 13:00) (0 - 28)  SpO2: 100% (19 Feb 2021 13:00) (91% - 100%)    I&O's Summary    18 Feb 2021 07:01  -  19 Feb 2021 07:00  --------------------------------------------------------  IN: 4776.5 mL / OUT: 823 mL / NET: 3953.5 mL    19 Feb 2021 07:01  -  19 Feb 2021 13:51  --------------------------------------------------------  IN: 4170 mL / OUT: 90 mL / NET: 4080 mL      Mode: AC/ CMV (Assist Control/ Continuous Mandatory Ventilation)  RR (machine): 28  TV (machine): 450  FiO2: 60  PEEP: 5  ITime: 1  MAP: 11  PIP: 25      LABS:                        6.9    8.09  )-----------( 189      ( 19 Feb 2021 11:46 )             20.8     02-19    143  |  106  |  22<H>  ----------------------------<  206<H>  3.2<L>   |  13<L>  |  2.71<H>    Ca    7.0<L>      19 Feb 2021 11:46  Phos  2.6     02-19  Mg     1.4     02-19    TPro  4.3<L>  /  Alb  2.2<L>  /  TBili  1.0  /  DBili  x   /  AST  1960<H>  /  ALT  797<H>  /  AlkPhos  120  02-19    PT/INR - ( 19 Feb 2021 11:46 )   PT: 18.5 sec;   INR: 1.59 ratio         PTT - ( 19 Feb 2021 11:46 )  PTT:33.0 sec    CAPILLARY BLOOD GLUCOSE      POCT Blood Glucose.: 325 mg/dL (18 Feb 2021 23:58)  POCT Blood Glucose.: 360 mg/dL (18 Feb 2021 21:25)  POCT Blood Glucose.: 165 mg/dL (18 Feb 2021 19:54)    ABG - ( 19 Feb 2021 10:28 )  pH, Arterial: 7.24  pH, Blood: x     /  pCO2: 26    /  pO2: 97    / HCO3: 11    / Base Excess: -15.0 /  SaO2: 97                  RADIOLOGY & ADDITIONAL TESTS:    Consultant(s) Notes Reviewed:  [x ] YES  [ ] NO    MEDICATIONS  (STANDING):  ceFAZolin   IVPB      ceFAZolin   IVPB 1000 milliGRAM(s) IV Intermittent every 8 hours  chlorhexidine 0.12% Liquid 15 milliLiter(s) Oral Mucosa every 12 hours  chlorhexidine 2% Cloths 1 Application(s) Topical <User Schedule>  magnesium sulfate  IVPB 1 Gram(s) IV Intermittent once  norepinephrine Infusion 1.9 MICROgram(s)/kG/Min (148 mL/Hr) IV Continuous <Continuous>  pantoprazole  Injectable 40 milliGRAM(s) IV Push daily  sodium bicarbonate  Infusion 0.258 mEq/kG/Hr (125 mL/Hr) IV Continuous <Continuous>    MEDICATIONS  (PRN):      PHYSICAL EXAM:  GENERAL: Moderate obese F intubated and unresponsive   HEAD:  Atraumatic, Normocephalic  EYES: Dilated, not responsive to light    ENMT: ETT in place   NERVOUS SYSTEM: No sedation and unarousable despite physical stimulation, Pupils fixed and non responsive, no gag reflex   CHEST/LUNG: clear to auscultate b/l  No rales, rhonchi, wheezing, or rubs. Dressing in place with active oozing from the ALEJANDRO drains. Barehugger in place  : Koehler in place with minimal urine  HEART: s1/s2 tachycardic  ABDOMEN: Soft,  Nondistended; Bowel sounds present  SKIN: bleeding from femoral CVC      Care Discussed with Consultants/Other Providers [ x] YES  [ ] NO Patient is a 45y old  Female who presents with a chief complaint of Mastectomy (19 Feb 2021 10:51)      INTERVAL HPI/OVERNIGHT EVENTS:   Patient admitted to ICU after cardiac arrest. Patient with PMHx of DVT of left IJ, breast Ca, s/p bilateral masectomy 2/17 had apparent syncopal episode last night in which rapid response was called. patient without palpable pulse, hence code blue called. Patient coded for 8 minutes before return of ROSC. Patient then went to OR for ligation of two bleeding vessels in the left breast. As of this AM, patient received 9pRBC, 6units plasma, and 2 units platelet, as well as cryoprecipitate 10units. As of 3:06PM patient obtained another 2u pRBC, 2plasma and 1 unit platelet. Seen by surgery for bedside wound exploration, in which code fusion was called and patient taken to OR for emergent surgery.      ICU Vital Signs Last 24 Hrs  T(C): 36.9 (19 Feb 2021 13:00), Max: 37.1 (18 Feb 2021 14:55)  T(F): 98.4 (19 Feb 2021 13:00), Max: 98.7 (18 Feb 2021 14:55)  HR: 139 (19 Feb 2021 13:00) (90 - 141)  BP: 98/60 (19 Feb 2021 13:00) (73/46 - 169/89)  BP(mean): 69 (19 Feb 2021 13:00) (53 - 108)  ABP: --  ABP(mean): --  RR: 28 (19 Feb 2021 13:00) (0 - 28)  SpO2: 100% (19 Feb 2021 13:00) (91% - 100%)    I&O's Summary    18 Feb 2021 07:01  -  19 Feb 2021 07:00  --------------------------------------------------------  IN: 4776.5 mL / OUT: 823 mL / NET: 3953.5 mL    19 Feb 2021 07:01  -  19 Feb 2021 13:51  --------------------------------------------------------  IN: 4170 mL / OUT: 90 mL / NET: 4080 mL      Mode: AC/ CMV (Assist Control/ Continuous Mandatory Ventilation)  RR (machine): 28  TV (machine): 450  FiO2: 60  PEEP: 5  ITime: 1  MAP: 11  PIP: 25      LABS:                        6.9    8.09  )-----------( 189      ( 19 Feb 2021 11:46 )             20.8     02-19    143  |  106  |  22<H>  ----------------------------<  206<H>  3.2<L>   |  13<L>  |  2.71<H>    Ca    7.0<L>      19 Feb 2021 11:46  Phos  2.6     02-19  Mg     1.4     02-19    TPro  4.3<L>  /  Alb  2.2<L>  /  TBili  1.0  /  DBili  x   /  AST  1960<H>  /  ALT  797<H>  /  AlkPhos  120  02-19    PT/INR - ( 19 Feb 2021 11:46 )   PT: 18.5 sec;   INR: 1.59 ratio         PTT - ( 19 Feb 2021 11:46 )  PTT:33.0 sec    CAPILLARY BLOOD GLUCOSE      POCT Blood Glucose.: 325 mg/dL (18 Feb 2021 23:58)  POCT Blood Glucose.: 360 mg/dL (18 Feb 2021 21:25)  POCT Blood Glucose.: 165 mg/dL (18 Feb 2021 19:54)    ABG - ( 19 Feb 2021 10:28 )  pH, Arterial: 7.24  pH, Blood: x     /  pCO2: 26    /  pO2: 97    / HCO3: 11    / Base Excess: -15.0 /  SaO2: 97                  RADIOLOGY & ADDITIONAL TESTS:    Consultant(s) Notes Reviewed:  [x ] YES  [ ] NO    MEDICATIONS  (STANDING):  ceFAZolin   IVPB      ceFAZolin   IVPB 1000 milliGRAM(s) IV Intermittent every 8 hours  chlorhexidine 0.12% Liquid 15 milliLiter(s) Oral Mucosa every 12 hours  chlorhexidine 2% Cloths 1 Application(s) Topical <User Schedule>  magnesium sulfate  IVPB 1 Gram(s) IV Intermittent once  norepinephrine Infusion 1.9 MICROgram(s)/kG/Min (148 mL/Hr) IV Continuous <Continuous>  pantoprazole  Injectable 40 milliGRAM(s) IV Push daily  sodium bicarbonate  Infusion 0.258 mEq/kG/Hr (125 mL/Hr) IV Continuous <Continuous>    MEDICATIONS  (PRN):      PHYSICAL EXAM:  GENERAL: Moderate obese F intubated and unresponsive   HEAD:  Atraumatic, Normocephalic  EYES: Dilated, not responsive to light    ENMT: ETT in place   NERVOUS SYSTEM: Unarousable despite physical stimulation, Pupils fixed and non responsive, no gag reflex   CHEST/LUNG: clear to auscultate b/l  No rales, rhonchi, wheezing, or rubs. Dressing in place with active oozing from the ALEJANDRO drains. Barehugger in place  : Koehler in place with minimal urine  HEART: s1/s2 tachycardic  ABDOMEN: Soft,  Nondistended; Bowel sounds present  SKIN: bleeding from femoral CVC      Care Discussed with Consultants/Other Providers [ x] YES  [ ] NO

## 2021-02-19 NOTE — CONSULT NOTE ADULT - SUBJECTIVE AND OBJECTIVE BOX
HPI: 46 yo female with hx of HTN, breast CA (dx in 7/2020) s/p chemo (completed in 10/2020), DVt  [facility](8/2020 on eliquis)  was admitted to Surgical unit for elective for  right breast total mastectomy with preop needle localization and right breast sentinel node bx, left breast prophylactic total mastectomy with left breast preop needle localization on 2/17/2021. Post OR pt was RRT on 2/17 night for profuse diaphoresis and lightheadedness and hypotension. Pt was given IVF. On 2/18 her Hb in am dropped to 8 from 11 and pt was transfused 2 RBCs .Around 19:45 pt was RRT as pt is found unresponsive and  then went pulseless. Code blue was called and CPR was initiated. R femoral CVC was placed. ROSC was achieved after 8 mins( 3 rounds of Epi+ sod bicarb x1, D50 x 1, rhythm was asystole) . Pt was  bleeding in the ALEJANDRO drains( 500 cc in total from 7am-7pm shift).  CBC returned with hemoglobin 5.1. Decision was therefore made to take the patient to the operating room to pursue active bleeding. Patient taken to OR, the left mastectomy site was opened. Two small pumping vessels were found and ligated. Patient appeared to be in DIC and had blood oozing from bare surfaces including around her central line. The surgical site was promptly closed after confirmation of no continued bleeding. She was taken to ICU for continued resuscitation.  2 given preop in ICU, 2 U PRBC given intraop with 4 of FFP and 1 of platelets. Urine output was minimal during the case. Pt came back to ICU post OR .        Activated Partial Thromboplastin Time (02.19.21 @ 15:31)   Activated Partial Thromboplastin Time: 37.0 sec       Historical Values  Activated Partial Thromboplastin Time (02.19.21 @ 15:31) Prothrombin Time and INR, Plasma (02.19.21 @ 15:31)   Prothrombin Time, Plasma: 27.0 sec Platelet Count - Automated: 64: Smear Reviewed, Result Confirmed. K/uL (02.19.21 @ 15:35) Fibrinogen Assay: 226 mg/dL (02.19.21 @ 11:46)     A: Possibility of DIC  Above values though appears not consistent with DIC, may reflect several interventions including VIT K, and transfusion of cryoprecipitates  Need for continuing assay fo PT, PTT, Platelet counts, fibrinogen and D-dimers   Transfuse for platelets < 50 K if bleeding  Continue F/U Fibrinogen and give further cryoprecipitates if < 100    Supportive treatment

## 2021-02-19 NOTE — PROGRESS NOTE ADULT - ASSESSMENT
45F POD1 bilateral mastectomy POD0 for left mastectomy site washout for continued bleeding  - continue ICU level of care  - monitor vitals, wean pressors as appropriate  - continue vent support  - NPO, continue aggressive fluid resuscitaiton  - monitor urine output  - monitor electrolytes for hyperkalemia  - monitor I's and O's, close monitor of drain output  - continue bicarb, follow up abg for pH  - continued transfusion for low hb, match with 2 additional FFP and 1 PLT

## 2021-02-19 NOTE — PROGRESS NOTE ADULT - SUBJECTIVE AND OBJECTIVE BOX
ADDENDUM:    This is to verify that I had a detailed discussion with the patient's , Mr. Sebastian Montoya, regarding her condition prior to taking the patient to the OP yesterday evening at around 9PM.  A telephone consent was achieved.    Discussed was her guarded condition after the cardiopulmonary arrest earlier.    Discussion again with her  this morning her poor prognosis and an update,  He expressed wished to see her last night adn this morning.  Arranged with Supervising nursing staff to allow him these visits.    He was allowed to do so.

## 2021-02-19 NOTE — PROGRESS NOTE ADULT - ASSESSMENT
45 F s/p b/l mastectomy with post operative course complicated by cardiac arrest, intubation, poor neurological response, DIC, and b/l breast hematomas, of which the L underwent two hematoma evacuations and the right one hematoma evacuation. Patient's prognosis remains guarded and she remains in critical condition:   - Surgical dressings to remain in place for at least 48 hours  - ALEJANDRO drains to medium continuous wall suction   - Continue resuscitation by ICU team, wean pressors as tolerated   - NG tube placement   - Trend labs

## 2021-02-19 NOTE — CHART NOTE - NSCHARTNOTEFT_GEN_A_CORE
Noted for LOS (ICU). Pt s/p cardiac arrest 2/18 PM, transferred to ICU. Intubated. NPO. Pt seen, earlier. Discussed briefly with PGY 2. Hemorrhagic shock, MARTINA, shock liver with grave prognosis noted. MD to monitor.

## 2021-02-19 NOTE — PROGRESS NOTE ADULT - SUBJECTIVE AND OBJECTIVE BOX
45F in critical condition POD1 from washout for bleeding after mastectomy.    Patient seen at bedside, continues critical. Not on sedation with minimal neurologic response GCS3 and no corneal or gag reflex appreciated at this time. Patient is on 60% and PEEP of 5% with saturation of 95%. Tachycardic to 130 with BP maintained MAP over 65 with 1.8mcg/kg levophed. Patient is NPO. Chandler in place with minimal urine output. Hb 6.9 this AM, 2 additional units of PRBC ordered and started with FFP and platelets. Ancef given for skin coverage. Patient remains in critical condition with a resolving acidosis requiring a bicarbonate drip and elevated lactate. Continues hypothermic to 35C with bare hugger. Coagulopathic with INR 1.6 receiving FFP and cryoprecipate overnight.    Vital Signs Last 24 Hrs  T(C): 33.1 (19 Feb 2021 03:00), Max: 37.1 (18 Feb 2021 13:15)  T(F): 91.6 (19 Feb 2021 03:00), Max: 98.7 (18 Feb 2021 13:15)  HR: 131 (19 Feb 2021 04:49) (90 - 131)  BP: 99/68 (19 Feb 2021 04:49) (73/46 - 169/89)  BP(mean): 73 (19 Feb 2021 04:49) (53 - 108)  RR: 28 (19 Feb 2021 04:49) (0 - 28)  SpO2: 94% (19 Feb 2021 04:49) (91% - 100%)  Gen: critical, intubated  Resp: endotracheal tube in place, equal breath sounds  CV: RRR  Chest: dressing in place with active oozing from the ALEJANDRO drains sanguinous  : chandler in place, minimal clear yellow urine  Neuro: GCS3, no pupillary or gag reflex appreciated                          6.9    7.74  )-----------( 171      ( 19 Feb 2021 03:37 )             21.7   02-19    143  |  106  |  20<H>  ----------------------------<  302<H>  4.5   |  12<L>  |  2.06<H>    Ca    6.9<L>      19 Feb 2021 03:37  Phos  9.1     02-19  Mg     1.8     02-19    TPro  4.1<L>  /  Alb  2.0<L>  /  TBili  0.8  /  DBili  x   /  AST  1448<H>  /  ALT  748<H>  /  AlkPhos  299<H>  02-19    24 hours:    02-17 @ 07:01  -  02-18 @ 07:00  --------------------------------------------------------  OUT:    Bulb (mL): 55 mL    Bulb (mL): 140 mL    Bulb (mL): 100 mL    Bulb (mL): 65 mL  Total OUT: 360 mL

## 2021-02-19 NOTE — PROGRESS NOTE ADULT - SUBJECTIVE AND OBJECTIVE BOX
Post op check     Patient seen and examined at the bedside. Patient's dressings are c/d/i, with 3 out of 4 drains to wall suction, the remaining drain with air leak in the breast so is to squeeze suction. Patient has been weaned off vasopressin and phenylephrine, remains on 1.5 mcg/kg of levophed. NG tube placed with coffee ground material aspirated. Flexiseal placed with blood tinged stool contents coming out.     Physical exam:    Gen: intubated, not sedated, NG tube in place with coffee ground material present  Resp: on vent settings  RR 28     mL    FiO2 60%   PEEP 5, overbreathing vent   Breast: dressings in place are c/d/i, with 3 out of 4 drains to wall suction, minimal bloody output present  Abd: distended, soft   Neuro: GCS3T, no cough reflex on suctioning, pupils fixed and dilated, noted to be biting the tube and spontaneously breathing on PS       Vital Signs Last 24 Hrs  T(C): 35.5 (19 Feb 2021 17:00), Max: 37 (19 Feb 2021 12:00)  T(F): 95.9 (19 Feb 2021 17:00), Max: 98.6 (19 Feb 2021 12:00)  HR: 119 (19 Feb 2021 18:30) (90 - 141)  BP: 103/55 (19 Feb 2021 18:15) (73/46 - 169/89)  BP(mean): 67 (19 Feb 2021 18:15) (53 - 108)  RR: 28 (19 Feb 2021 18:30) (0 - 35)  SpO2: 98% (19 Feb 2021 18:30) (91% - 100%)

## 2021-02-19 NOTE — CHART NOTE - NSCHARTNOTEFT_GEN_A_CORE
Mr Sebastian Montoya updated about patient's condition once after Code blue and  consent for Beth and central line obtained,  contacted again and updated about transferring patient to OR for urgent wound exploration. all questions and concerns addressed. Mr Cortes was called while patient was in PACU and was updated about operative findings. Mr Cortes requested to visit his wife. Super wiser contacted and arrangement was made for  Mr Sebastian Rosen to visit his wife at bedside. Mr Rosen visited the patient over-night , was updated about patient condition by attending. writer was informed by Nurse Berrios that patient needs blood transfusion consent. There was no blood consent in the chart available, although patient received more than 10 different blood product by that time.  was called and consent for blood product transfusion obtained over the phone. All questions and concerns addressed.

## 2021-02-19 NOTE — PROGRESS NOTE ADULT - ASSESSMENT
45 F s/p b/l mastectomy with post operative course complicated by cardiac arrest, intubation, poor neurological response, DIC, and b/l breast hematomas, of which the L underwent two hematoma evacuations and the right one hematoma evacuation. Patient's prognosis remains guarded and she remains in critical condition:   - Surgical dressings to remain in place for at least 48 hours  - ALEJANDRO drains to medium continuous wall suction   - Continue resuscitation by ICU team, wean pressors as tolerated   - NG tube placed: coffee ground material aspirated. Place to low intermittent suction   - Trend labs

## 2021-02-19 NOTE — CONSULT NOTE ADULT - SUBJECTIVE AND OBJECTIVE BOX
DATE OF SERVICE: 2021   Patient was seen,examined and evaluated  by me.ER evaluation, Labs and Hospital course was reviewed,    CHIEF COMPLAINT:Cardiac arrest    HPI:46 yo female with hx of HTN, breast CA (dx in 2020) s/p chemo (completed in 10/2020), DVt  [facility](2020 on eliquis)  was admitted to Surgical unit for elective for  right breast total mastectomy with preop needle localization and right breast sentinel node bx, left breast prophylactic total mastectomy with left breast preop needle localization on 2021.  Post OR pt was RRT on  night for profuse diaphoresis and lightheadedness and hypotension.   Pt was given IVF. On  her Hb in am dropped to 8 from 11 and pt was transfused 2 RBCs .Around 19:45 pt was RRT as pt is found unresponsive and  then went pulseless.   Code blue was called and CPR was initiated. R femoral CVC was placed.   ROSC was achieved after 8 mins( 3 rounds of Epi+ sod bicarb x1, D50 x 1, rhythm was asystole) . Pt was  bleeding in the ALEJANDRO drains( 500 cc in total from 7am-7pm shift).  CBC returned with hemoglobin 5.1. Decision was therefore made to take the patient to the operating room to pursue active bleeding. Patient taken to OR, the left mastectomy site was opened. Two small pumping vessels were found and ligated. Patient appeared to be in DIC and had blood oozing from bare surfaces including around her central line. The surgical site was promptly closed after confirmation of no continued bleeding.   She was taken to ICU for continued resuscitation.  2 given preop in ICU, 2 U PRBC given intraop with 4 of FFP and 1 of platelets. Urine output was minimal during the case. Now remains intubated unresponsive      PAST MEDICAL & SURGICAL HISTORY:  S/P chemotherapy, time since greater than 12 weeks  for right breast mass via left chest [facility]- ended 10/2020    HLD (hyperlipidemia)  controlled with diet    History of drainage of abscess  right groin  - I and D    Mass of right lung  hx - sx done    Deep vein thrombosis (DVT)  left  internal jugular vein (dx 2020 on Eliquis)    Hypertension    Breast cancer  dx in 2020, s/p chemo (completed in 10/2020)    S/P  section      History of vascular access device  2020- left chest  [facility]    Mass of right lung  right video assisted thoracoscopy robotic assisted right lower lobe wedge resection 2020    S/P right oophorectomy  2015    Ovarian cyst  removed in 2008    S/P biopsy  lung 2020    H/O tubal ligation          MEDICATIONS  (STANDING):  ceFAZolin   IVPB      ceFAZolin   IVPB 1000 milliGRAM(s) IV Intermittent every 8 hours  chlorhexidine 0.12% Liquid 15 milliLiter(s) Oral Mucosa every 12 hours  chlorhexidine 2% Cloths 1 Application(s) Topical <User Schedule>  norepinephrine Infusion 0.05 MICROgram(s)/kG/Min (3.4 mL/Hr) IV Continuous <Continuous>  pantoprazole  Injectable 40 milliGRAM(s) IV Push daily  sodium bicarbonate  Infusion 0.207 mEq/kG/Hr (100 mL/Hr) IV Continuous <Continuous>      FAMILY HISTORY:  FH: myocardial infarction  father-    FH: hypertension  mother- alive      No family history of premature coronary artery disease or sudden cardiac death    SOCIAL HISTORY:  Smoking-[ ] Active  [ ] Former [x ] Non Smoker  Alcohol-[x ] Denies [ ] Social [ ] Daily  Ilicit Drug use-[ x] Denies [ ] Active user    REVIEW OF SYSTEMS:  Constitutional: [ ] fever, [ ]weight loss, [ ]fatigue   Activity [ ] Bedbound,[ ] Ambulates [ ] Unassisted[ ] Cane/Walker [ ] Assistence.  Effort tolerance:[ ] Excellent [ ] Good [ ] Fair [ ] Poor [ ]  Eyes: [ ] visual changes  Respiratory: [ ]shortness of breath;  [ ] cough, [ ]wheezing, [ ]chills, [ ]hemoptysis  Cardiovascular: [ ] chest pain, [ ]palpitations, [ ]dizziness,  [ ]leg swelling[ ]orthopnea [ ]PND  Gastrointestinal: [ ] abdominal pain, [ ]nausea, [ ]vomiting,  [ ]diarrhea,[ ]constipation  Genitourinary: [ ] dysuria, [ ] hematuria  Neurologic: [ ] headaches [ ] tremors[ ] weakness  Skin: [ ] itching, [ ]burning, [ ] rashes  Endocrine: [ ] heat or cold intolerance  Musculoskeletal: [ ] joint pain or swelling; [ ] muscle, back, or extremity pain  Psychiatric: [ ] depression, [ ]anxiety, [ ]mood swings, or [ ]difficulty sleeping  Hematologic: [ ] easy bruising, [ ] bleeding gums       [ ] All others negative	  [x ] Unable to obtain-intubated    Vital Signs Last 24 Hrs  T(C): 36.3 (2021 09:00), Max: 37.1 (2021 13:15)  T(F): 97.3 (2021 09:00), Max: 98.7 (2021 13:15)  HR: 137 (2021 08:45) (90 - 141)  BP: 98/62 (2021 08:45) (73/46 - 169/89)  BP(mean): 68 (2021 08:45) (53 - 108)  RR: 28 (2021 08:45) (0 - 28)  SpO2: 100% (2021 08:45) (91% - 100%)  I&O's Summary    2021 07:01  -  2021 07:00  --------------------------------------------------------  IN: 4776.5 mL / OUT: 823 mL / NET: 3953.5 mL    2021 07:01  -  2021 10:04  --------------------------------------------------------  IN: 680 mL / OUT: 50 mL / NET: 630 mL        PHYSICAL EXAM:  General: No acute distress BMI-30.4  HEENT: EOMI, PERRL[ ] Icteric Intubated  Neck: Supple, No JVD  Lungs: Equal air entry bilaterally; [ ] Rales [ ] Rhonchi [ ] Wheezing  Heart: Regular rate and rhythm;[x ] Murmurs-   2/6 [x ] Systolic [ ] Diastolic [ ] Radiation,No rubs, or gallops  Abdomen: Nontender, bowel sounds present  Extremities: No clubbing, cyanosis, or edema[ ] Calf tenderness  Nervous system:  Unresponsive  Skin: No rashes or lesions      LABS:  02-19    143  |  106  |  20<H>  ----------------------------<  302<H>  4.5   |  12<L>  |  2.06<H>    Ca    6.9<L>      2021 03:37  Phos  9.1       Mg     1.8         TPro  4.1<L>  /  Alb  2.0<L>  /  TBili  0.8  /  DBili  x   /  AST  1448<H>  /  ALT  748<H>  /  AlkPhos  299<H>      Creatinine Trend: 2.06<--, 1.77<--, 1.83<--, 1.74<--, 1.02<--, 0.59<--                        6.9    7.74  )-----------( 171      ( 2021 03:37 )             21.7     PT/INR - ( 2021 03:37 )   PT: 19.3 sec;   INR: 1.66 ratio    PTT - ( 2021 03:37 )  PTT:39.3 sec    Lipid Panel:   Cardiac Enzymes: CARDIAC MARKERS ( 2021 02:49 )  2.120 ng/mL / x     / 5220 U/L / x     / x      CARDIAC MARKERS ( 2021 00:03 )  1.260 ng/mL / x     / x     / x     / x      CARDIAC MARKERS ( 2021 20:15 )  0.415 ng/mL / x     / x     / x     / x            RADIOLOGY:XR CHEST PORTABLE IMMED 1V                      ET tube, left central line and bilateral chest tubes in satisfactory position.  Questionable developing left upper lobe infiltrate. There is no pleural effusion. Thereis no pneumothorax.  The heart is normal in size. There is no mediastinal or hilar mass.      ECG [my interpretation]:Sinus rhythm no acute ST T wave abnormality    TELEMETRY:Sinus Rhythm    ECHO:Out Patient  LVEF 55% Normal LV Systolic Function  no significant valvular abnormality

## 2021-02-19 NOTE — BRIEF OPERATIVE NOTE - OPERATION/FINDINGS
Right sentinel lymph node biopsy performed: frozen negative  B/l total mastectomy performed
Hematomas of L and R breast were evacuated. No fresh bleeding identified, only oozing. The breast cavities were packed with krilex and left open
left mastectomy surgical site opened, copious clot removed, two small pulsatile bleeders suture ligated, continued oozing from bare surfaces at end of case consistent with coagulopathy. drains replaced, site closed with staples

## 2021-02-19 NOTE — CONSULT NOTE ADULT - SUBJECTIVE AND OBJECTIVE BOX
[facility] NEPHROLOGY- CONSULTATION NOTE    Patient is a 45y Female with breast cancer who presented to the hospital for bilateral mastectomy.  Following surgery, pt was found to have bleeding and cardiac arrest.  Pt has had two returns to the OR for evacuation of hematoma, effort to stop bleeding.  She has received many units of PRBCs, FFPs, and Hb had not been rising appropriately, though now seems to have risen and stabilized.  Since cardiac arrest, pt has has poor urine output and this is worsening.   She has a severe acidosis with elevated lactate levels.    Pt is on Abx (vancomycin/zosyn), norepinephrine, and sodium bicarbonate gtt.      PAST MEDICAL & SURGICAL HISTORY:  S/P chemotherapy, time since greater than 12 weeks  for right breast mass via left chest [facility]- ended 10/2020    HLD (hyperlipidemia)  controlled with diet    History of drainage of abscess  right groin  - I and D    Mass of right lung  hx - sx done    Deep vein thrombosis (DVT)  left  internal jugular vein (dx 2020 on Eliquis)    Hypertension    Breast cancer  dx in 2020, s/p chemo (completed in 10/2020)    S/P  section      History of vascular access device  2020- left chest  [facility]    Mass of right lung  right video assisted thoracoscopy robotic assisted right lower lobe wedge resection 2020    S/P right oophorectomy  2015    Ovarian cyst  removed in 2008    S/P biopsy  lung 2020    H/O tubal ligation        No Known Allergies    Home Medications Reviewed  Hospital Medications:   MEDICATIONS  (STANDING):  norepinephrine Infusion 1.5 MICROgram(s)/kG/Min (56 mL/Hr) IV Continuous <Continuous>  piperacillin/tazobactam IVPB.. 3.375 Gram(s) IV Intermittent every 12 hours  sodium bicarbonate  Infusion 0.09 mEq/kG/Hr (50 mL/Hr) IV Continuous <Continuous>  vancomycin  IVPB        SOCIAL HISTORY:  Denies ETOh,Smoking,   FAMILY HISTORY:  FH: myocardial infarction  father-    FH: hypertension  mother- alive      REVIEW OF SYSTEMS: unable to obtain.       ICU Vital Signs Last 24 Hrs  T(C): 34.4 (2021 20:00), Max: 37 (2021 12:00)  T(F): 93.9 (2021 20:00), Max: 98.6 (2021 12:00)  HR: 116 (2021 21:15) (90 - 141)  BP: 118/70 (2021 21:00) (73/46 - 132/78)  BP(mean): 80 (2021 21:00) (53 - 92)  ABP: 95/59 (2021 21:15) (95/59 - 149/91)  ABP(mean): 72 (2021 21:15) (46 - 112)  RR: 35 (:15) (0 - 35)  SpO2: 90% (2021 21:15) (90% - 100%)  Weight (kg): 82.9 ( @ 08:00)    PHYSICAL EXAM:  Constitutional: intubated, not sedated, but nonresponsive.  HEENT: +facial edema, anicteric sclera, +ETT  Respiratory: mechanical BS B with some wheezing on L anteriorly  Cardiovascular: S1, S2, RRR  Gastrointestinal: BS+, soft, ND  Extremities: No cyanosis or clubbing. +Anasarca  Neurological: nonresponsive despite absence of sedation  : + chandler, 20ml yellow urine, bladder not palpable.   Skin: No rashes    LABS:      146<H>  |  111<H>  |  19<H>  ----------------------------<  168<H>  3.5   |  15<L>  |  2.50<H>    Ca    8.0<L>      2021 17:04  Phos  5.9       Mg     1.5         TPro  4.6<L>  /  Alb  2.3<L>  /  TBili  1.6<H>  /  DBili      /  AST  1825<H>  /  ALT  670<H>  /  AlkPhos  98      Creatinine Trend: 2.50 <--, 2.62 <--, 2.71 <--, 2.06 <--, 1.77 <--, 1.83 <--, 1.74 <--, 1.02 <--                        14.0   3.94  )-----------( 117      ( 2021 21:11 )             42.4       Blood Gas Profile - Arterial (21 @ 21:10)    pH, Arterial: 7.32    pCO2, Arterial: 24 mmHg    pO2, Arterial: 52 mmHg    HCO3, Arterial: 12 mmol/L    Base Excess, Arterial: -12.6 mmol/L    Oxygen Saturation, Arterial: 90 %    FIO2, Arterial: 70    Blood Gas Comments Arterial: AC 35/500/70/5, BG    Lactate, Blood (21 @ 17:04)    Lactate, Blood: 13.4: TYPE:(C=Critical, N=Notification, A=Abnormal) _  Lactate, Blood (21 @ 11:46)    Lactate, Blood: 17.0: TYPE:(C=Critical, N=Notification, A=Abnormal)      Urine Studies:      RADIOLOGY & ADDITIONAL STUDIES:  < from: Xray Chest 1 View- PORTABLE-Urgent (Xray Chest 1 View- PORTABLE-Urgent .) (21 @ 17:23) >    EXAM:  XR CHEST PORTABLE URGENT 1V                            PROCEDURE DATE:  2021          INTERPRETATION:  Portable chest radiograph    CLINICAL INFORMATION: ICU portable chest radiograph follow-up.  2. Placement    TECHNIQUE:  Portable  AP view of the chest was obtained.    COMPARISON: 2020 chest available for review.    FINDINGS:  The lungs show bilateral perihilar multifocal and diffuse airspace disease. No pneumothorax..  RIGHT IJ catheter tip in SVC. ET tube tip above tracheal bifurcation.  LEFT subclavian catheter tip in SVC.  Transient upper lower hemithoraces.  The heart and mediastinum are within normal limits.    Visualized osseous structures are intact.        IMPRESSION:   Bilateral perihilar multifocal lung diffuse airspace disease..  Catheters and tubes in place.            SUPRIYA EPPERSON MD; Attending Radiologist  This document has been electronically signed. 2021  7:47PM    < end of copied text >

## 2021-02-19 NOTE — PROGRESS NOTE ADULT - SUBJECTIVE AND OBJECTIVE BOX
s/p exploration and evacuation of hematoma left mastectomy wound.  Patient remains hemodynamically critical requiring Levophed.  In addition, there is no neurological response.    Continues to ooze from wounds due to DIC.  Resuscitative efforts with blood products are ongoing.

## 2021-02-19 NOTE — PROGRESS NOTE ADULT - ASSESSMENT
44 yo female with hx of HTN, breast CA (dx in 7/2020) s/p chemo (completed in 10/2020), DVt  [facility](8/2020 on eliquis)  was admitted to Surgical unit for elective for  right breast total mastectomy with preop needle localization and right breast sentinel node bx, left breast prophylactic total mastectomy with left breast preop needle localization on 2/17/2021. Post OR pt was RRT on 2/17 night for profuse diaphoresis and lightheadedness and hypotension. Pt was given IVF. On 2/18 her Hb in am dropped to 8 from 11 and pt was transfused 2 RBCs .Around 19:45 pt was RRT as pt is found unresponsive and  then went pulseless. Code blue was called and CPR was initiated. R femoral CVC was placed. ROSC was achieved after 8 mins( 3 rounds of Epi+ sod bicarb x1, D50 x 1, rhythm was asystole) . Pt was profusely bleeding in the ALEJANDRO drains( 500 cc in total from 7am-7pm shift).  CBC returned with hemoglobin 5.1. Decision was therefore made to take the patient to the operating room to pursue active bleeding. Patient taken to OR, the left mastectomy site was opened. Two small pumping vessels were found and ligated. Patient appeared to be in DIC and had blood oozing from bare surfaces including around her central line. The surgical site was promptly closed after confirmation of no continued bleeding. She was taken to ICU for continued resuscitation.  2 given preop in ICU, 2 U PRBC given intraop with 4 of FFP and 1 of platelets. Urine output was minimal during the case. Pt came back to ICU post OR     Pt admitted to ICU post cardiac  Arrest and Hemorrhagic shock and Acute renal failure         Assessment:  1. Cardiac Arrest     2. Hemorrhagic Shock   3. Acute Renal failure   4. Lactic acidosis   5. Shock liver   6. Breast Ca  b/l Mastectomy  7. DVT LIJ   8. HTN      Plan:    =====================[CNS ]==============================  # Acute Encephalopathy  :   - AAOx 3  at baseline  - unresponsive s/p cardiac arrest , currently not triggering vent, no corneal reflexes   - Monitor mental status off sedation   - will get CTH to assess the brain injury once pt is more stable     =====================[CVS ]===============================  # Cardiac Arrest :   - downtime 8 mins   - no hypothermia protocol due to active bleeding   - holding off Aspirin due to bleeding   - Trend trop   - f/u Echo   - cardio consult Dr Maher- cardiac arrest likely due to hemorrhagic shock    # Hemorrhagic Shock :   - s/p b/l mastectomy 2/17 , 4 ALEJANDRO drains in place , oozing blood   - Hemoglobin remains depressed despite multiple units of prbc, plasma, platelet.  - monitor CBC q4 for now   - monitor ALEJANDRO drain discharge   - c/w vasopressor for BP support   - Hold BP medications    =====================[RESP ]==============================  # Acute Hypoxic Resp Failure :   - Intubated during cardiac arrest   - c/w mech vent   - AGMA due to lactate acidosis  - f/u ABG and adjust vent setting     =====================[ GI ]================================  # Shock Liver :   - due to hypovolemic shock   - LFts trending up    - NPO  for now   - Correct underlying hypotension  ====================[ RENAL ]=============================   # Acute Renal failure :   - likely prerenal given hypotension   - metabolic acidosis on bicarb drip. Hold further fluids   - Monitor Ph, BMP q4 for now   - monitor urine output   - Monitor electrolytes     # Lactic Acidosis :   - lactate continues to be elevated  - holding fluids for now  - likely 2/2 shock   -cont to trend     =====================[ ID ]================================  # B/l Mastectomy :   - started on ancef as per surgery recc     ===================[ HEME/ONC ]===========================  # Anemia  :   -  Due to bleeding vessels in the breast  - Patient returning to OR for control of bleeding  - hold AC and aspirin    # Thrombocytopenia   - monitor and transfuse plts as needed     # DIC :   - fibrinogen 107   - s/p transfusion 10 U of cryo  - Heme/onc QMA, Dr. Blancas consulted     # Breast ca s/p b/l Mastectomy :   - s/p Left mastectomy washout POD1  - B/L masectomy POD2   - monitor ALEJANDRO drain discharge   - c/w dressing   - Monitor cbc and transfuse as needed   - c/w Prophylactic ancef   - Surgery following Dr Calles     =====================[ ENDO ]=============================  # No history of DM  - target CBG < 180  - FS q4 while NPO  - Start diet when possible    ==================[ PROPHYLAXIS ]==========================   # Dvt :SCD , holding AC due to hemorrhagic shock   # Gi : Protonix     ====================[ DISPO ]==============================   - Monitor in ICU     ===================[ PROGNOSIS ]===========================  - Guarded      44 yo female with hx of HTN, breast CA (dx in 7/2020) s/p chemo (completed in 10/2020), DVt  [facility](8/2020 on eliquis)  was admitted to Surgical unit for elective for  right breast total mastectomy with preop needle localization and right breast sentinel node bx, left breast prophylactic total mastectomy with left breast preop needle localization on 2/17/2021. Post OR pt was RRT on 2/17 night for profuse diaphoresis and lightheadedness and hypotension. Pt was given IVF. On 2/18 her Hb in am dropped to 8 from 11 and pt was transfused 2 RBCs .Around 19:45 pt was RRT as pt is found unresponsive and  then went pulseless. Code blue was called and CPR was initiated. R femoral CVC was placed. ROSC was achieved after 8 mins( 3 rounds of Epi+ sod bicarb x1, D50 x 1, rhythm was asystole) . Pt was profusely bleeding in the ALEJANDRO drains( 500 cc in total from 7am-7pm shift).  CBC returned with hemoglobin 5.1. Decision was therefore made to take the patient to the operating room to pursue active bleeding. Patient taken to OR, the left mastectomy site was opened. Two small pumping vessels were found and ligated. Patient appeared to be in DIC and had blood oozing from bare surfaces including around her central line. The surgical site was promptly closed after confirmation of no continued bleeding. She was taken to ICU for continued resuscitation.  2 given preop in ICU, 2 U PRBC given intraop with 4 of FFP and 1 of platelets. Urine output was minimal during the case. Pt came back to ICU post OR     Pt admitted to ICU post cardiac  Arrest and Hemorrhagic shock and Acute renal failure         Assessment:  1. Cardiac Arrest     2. Hemorrhagic Shock   3. Acute Renal failure   4. Lactic acidosis   5. Shock liver   6. Breast Ca  b/l Mastectomy  7. DVT LIJ   8. HTN      Plan:    =====================[CNS ]==============================  # Acute Encephalopathy  :   - AAOx 3  at baseline  - unresponsive s/p cardiac arrest , currently not triggering vent, no corneal reflexes   - Monitor mental status off sedation   - will get CTH to assess the brain injury once pt is more stable   - Neuro, Dr. Angel consulted    =====================[CVS ]===============================  # Cardiac Arrest :   - downtime 8 mins   - no hypothermia protocol due to active hemorrhage  - holding off Aspirin due to bleeding   - Trend trop   - f/u Echo   - cardio consult Dr Maher- cardiac arrest likely due to hemorrhagic shock    # Hemorrhagic Shock :   - s/p b/l mastectomy 2/17 , 4 ALEJANDRO drains in place , oozing blood   - Hemoglobin remains depressed despite multiple units of prbc, plasma, platelet.  - monitor CBC q4 for now   - monitor ALEJANDRO drain discharge   - c/w vasopressor for BP support   - Hold BP medications    =====================[RESP ]==============================  # Acute Hypoxic Resp Failure :   - Intubated during cardiac arrest   - c/w [facility] vent   - AGMA due to lactate acidosis  - f/u ABG and adjust vent setting     =====================[ GI ]================================  # Shock Liver :   - due to hypovolemic shock   - LFts trending up    - NPO  for now   - Correct underlying hypotension    ====================[ RENAL ]=============================   # Acute Renal failure :   - likely prerenal given hypotension   - metabolic acidosis on bicarb drip. Hold further fluids   - Monitor Ph, BMP q4 for now   - monitor urine output   - Monitor electrolytes   Nephro, Dr. Rosas consulted    # Lactic Acidosis :   - lactate continues to be elevated  - holding fluids for now  - likely 2/2 shock   -cont to trend     =====================[ ID ]================================  # B/l Mastectomy :   - started on ancef as per surgery recc     ===================[ HEME/ONC ]===========================  # Anemia  :   -  Due to bleeding vessels in the breast  - Patient returning to OR for control of bleeding  - hold AC and aspirin    # Thrombocytopenia   - monitor and transfuse plts as needed     # DIC :   - fibrinogen 107   - s/p transfusion 10 U of cryo  - Heme/onc QMA, Dr. Blancas consulted     # Breast ca s/p b/l Mastectomy :   - s/p Left mastectomy washout POD1  - B/L masectomy POD2   - monitor ALEJANDRO drain discharge   - c/w dressing   - Monitor cbc and transfuse as needed   - c/w Prophylactic ancef   - Surgery following Dr Calles     =====================[ ENDO ]=============================  # No history of DM  - target CBG < 180  - FS q4 while NPO  - Start diet when possible    ==================[ PROPHYLAXIS ]==========================   # Dvt :SCD , holding AC due to hemorrhagic shock   # Gi : Protonix     ====================[ DISPO ]==============================   - Monitor in ICU     ===================[ PROGNOSIS ]===========================  - Guarded      44 yo female with hx of HTN, breast CA (dx in 7/2020) s/p chemo (completed in 10/2020), DVt  [facility](8/2020 on eliquis)  was admitted to Surgical unit for elective for  right breast total mastectomy with preop needle localization and right breast sentinel node bx, left breast prophylactic total mastectomy with left breast preop needle localization on 2/17/2021. Post OR pt was RRT on 2/17 night for profuse diaphoresis and lightheadedness and hypotension. Pt was given IVF. On 2/18 her Hb in am dropped to 8 from 11 and pt was transfused 2 RBCs .Around 19:45 pt was RRT as pt is found unresponsive and  then went pulseless. Code blue was called and CPR was initiated. R femoral CVC was placed. ROSC was achieved after 8 mins( 3 rounds of Epi+ sod bicarb x1, D50 x 1, rhythm was asystole) . Pt was profusely bleeding in the ALEJANDRO drains( 500 cc in total from 7am-7pm shift).  CBC returned with hemoglobin 5.1. Decision was therefore made to take the patient to the operating room to pursue active bleeding. Patient taken to OR, the left mastectomy site was opened. Two small pumping vessels were found and ligated. Patient appeared to be in DIC and had blood oozing from bare surfaces including around her central line. The surgical site was promptly closed after confirmation of no continued bleeding. She was taken to ICU for continued resuscitation.     Pt admitted to ICU post cardiac  Arrest and Hemorrhagic shock and Acute renal failure         Assessment:  1. Cardiac Arrest     2. Hemorrhagic Shock   3. Acute Renal failure   4. Lactic acidosis   5. Shock liver   6. Breast Ca  b/l Mastectomy  7. DVT [facility]   8. HTN      Plan:    =====================[CNS ]==============================  # Acute Encephalopathy  :   - unresponsive s/p cardiac arrest , currently not triggering vent, no corneal reflexes   - Monitor mental status off sedation   - will get CTH to assess the brain injury once pt is more stable   - Neuro, Dr. Angel consulted    =====================[CVS ]===============================  # Cardiac Arrest :   - ROSC achieved after 8 mins   - no hypothermia protocol due to active hemorrhage  - holding off Aspirin due to bleeding   - Trend trop   - f/u Echo   - cardio consult Dr Maher- cardiac arrest likely due to hemorrhagic shock    # Hemorrhagic Shock :   - s/p b/l mastectomy 2/17 , 4 ALEJANDRO drains in place , oozing blood   - Hemoglobin remains depressed despite multiple units of prbc, plasma, platelet.  - monitor CBC q4 for now   - monitor ALEJANDRO drain discharge   - c/w vasopressor for BP support   - Hold BP medications    =====================[RESP ]==============================  # Acute Hypoxic Resp Failure :   - Intubated during cardiac arrest   - c/w mech vent   - AGMA due to lactate acidosis  - f/u ABG and adjust vent setting     =====================[ GI ]================================  # Shock Liver :   - due to hypovolemic shock   - LFts trending up    - NPO  for now   - Correct underlying hypotension    ====================[ RENAL ]=============================   # Acute Renal failure :   - likely prerenal given hypotension   - metabolic acidosis on bicarb drip. Hold further fluids   - Monitor Ph, BMP q4 for now   - monitor urine output   - Monitor electrolytes   Nephro, Dr. Rosas consulted    # Lactic Acidosis :   - lactate continues to be elevated  - likely 2/2 shock   -cont to trend     =====================[ ID ]================================  # B/l Mastectomy :   - started on ancef as per surgery recc     ===================[ HEME/ONC ]===========================  # Anemia  :   -  Due to bleeding vessels in the breast  - Patient returning to OR for control of bleeding  - hold AC and aspirin    # Thrombocytopenia   - monitor and transfuse plts as needed     # DIC :   - fibrinogen 107   - s/p transfusion 10 U of cryo  - Heme/onc QMA, Dr. Blancas consulted     # Breast ca s/p b/l Mastectomy :   - s/p Left mastectomy washout POD1  - B/L masectomy POD2   - monitor ALEJANDRO drain discharge   - c/w dressing   - Monitor cbc and transfuse as needed   - c/w Prophylactic ancef   - Surgery following Dr Calles     =====================[ ENDO ]=============================  # No history of DM  - target CBG < 180  - FS q4 while NPO  - Start diet when possible    ==================[ PROPHYLAXIS ]==========================   # Dvt :SCD , holding AC due to hemorrhagic shock   # Gi : Protonix     ====================[ DISPO ]==============================   - Monitor in ICU     ===================[ PROGNOSIS ]===========================  - Guarded

## 2021-02-19 NOTE — PROGRESS NOTE ADULT - SUBJECTIVE AND OBJECTIVE BOX
Progress note  Patient monitored throughout the day for concern for bleeding at mastectomy site on left side. 2 units given early in day with incomplete response in hb 8->9. Repeat hb was pending for 8pm. Patient underwent RRT, code with return of vitals before that time. CBC returned with hemoglobin 5.1. Decision was therefore made to take the patient to the operating room to pursue active bleeding. Patient taken to OR, the left mastectomy site was opened. Two small pumping vessels were found and ligated. Patient appeared to be in DIC and had blood oozing from bare surfaces including around her central line. The surgical site was promptly closed after confirmation of no continued bleeding. She was taken to ICU for continued resuscitation. 2 U PRBC given in AM, 2 given preop in ICU, 2 U PRBC given intraop with 4 of FFP and 1 of platelets. Urine output was minimal during the case. Patient continues on a bicarbonate drip, and requireing 0.8 mcg/kg at end of case to maintain blood pressure.    ICU Vital Signs Last 24 Hrs  T(C): 32.8 (18 Feb 2021 21:00), Max: 37.1 (18 Feb 2021 13:15)  T(F): 91 (18 Feb 2021 21:00), Max: 98.7 (18 Feb 2021 13:15)  HR: 98 (18 Feb 2021 21:30) (85 - 110)  BP: 109/48 (18 Feb 2021 21:30) (83/59 - 169/89)  BP(mean): 61 (18 Feb 2021 21:30) (61 - 108)  ABP: --  ABP(mean): --  RR: 20 (18 Feb 2021 21:30) (17 - 22)  SpO2: 100% (18 Feb 2021 21:15) (98% - 100%)  24 hours:    02-17 @ 07:01  -  02-18 @ 07:00  --------------------------------------------------------  OUT:    Bulb (mL): 55 mL    Bulb (mL): 140 mL    Bulb (mL): 100 mL    Bulb (mL): 65 mL  Total OUT: 360 mL    Gen: critical, intubated  Resp: endotracheal tube in place, equal breath sounds  CV: RRR  Chest: dressing in place no strike through, with active oozing from the ALEJANDRO drains  : chandler in place, minimal clear yellow urine  Neuro: GCS3 recent anesthesia

## 2021-02-19 NOTE — PROGRESS NOTE ADULT - SUBJECTIVE AND OBJECTIVE BOX
As per ICU attending, the coagulation parameters are improving.  However, she still is acidotic and continues to bleed from her wounds.    This is likely oozing from the wound bed.    Will take to OR and debride clots and washout wound and pack tightly to decrease oozing.    This was discussed with the , Mr. Sebastian Montoya.  He is agreeable to the procedure.    Also discussed the grave prognosis, including the poor neurological response with Mr. Montoya.

## 2021-02-19 NOTE — PROGRESS NOTE ADULT - SUBJECTIVE AND OBJECTIVE BOX
s/p re-exploration of mastectomy wounds, evacuation of hematoma and packing of wounds with guaze.    The patient remains in guarded condition.  Received multiple pRBC units, FFP and cryoprecipitate in the OR.  The BP appeares to be improved, however is on supportive meds.    Will continue supportive measures as per ICU management.    Above discussed [facility] , Mr. Sebastian Montoya.

## 2021-02-19 NOTE — CHART NOTE - NSCHARTNOTEFT_GEN_A_CORE
Surgery team at bedside. Wound exploration done at bedside. Pt lost a lot blood. Code fusion called over head, Pt emergently taken to OR. Surgery team at bedside. Wound exploration done at bedside, noted with bleeding. Code fusion called over head, Pt emergently taken to OR.

## 2021-02-19 NOTE — BRIEF OPERATIVE NOTE - NSICDXBRIEFPREOP_GEN_ALL_CORE_FT
PRE-OP DIAGNOSIS:  Breast cancer 19-Feb-2021 00:03:12  Escobar Bowie  
PRE-OP DIAGNOSIS:  Breast cancer 17-Feb-2021 12:29:49  Leigh Gongora  
PRE-OP DIAGNOSIS:  Breast hematoma 19-Feb-2021 17:45:56  Leigh Gongora  Breast cancer 17-Feb-2021 12:29:49  Leigh Gongora

## 2021-02-19 NOTE — CONSULT NOTE ADULT - ATTENDING COMMENTS
[facility] NEPHROLOGY  Marcos Rosas M.D.  Pawan Brambila D.O.  Nhung Arriaza M.D.  Aziza Costello, MSN, ANP-C    Telephone: [phone]  Facsimile: [phone]    [address] [place] [address]
Patient was seen and examined by me on 02/19/2021,interim events noted,labs and radiology studies reviewed.  Eren Maher MD,[facility]  [address] [place] [address].  [facility]. [phone]
MICU ATTENDING. I have personally seen and examined the pt. I was physically present for the key elements service provided. I agree with above history, physical and plan which I edited where appropriate. I total spent 35 min critical care time    44 y/o with PMH of breast Ca, s/p chemo, DVT, s/p BL mastectomy found in cardiac arrest . Resuscitated successfully , brought to ICu for further MX. Pt had high bloody output from ALEJANDRO drainage, w/u revealed worsening of anemia. Taken to OR to control bleeding.    A/P S/p cardiac arrest   resp failure   hemorrhagic shock   Anemia, coagulopathy   ARF oliguric with hyperK  SEvere metabolic acidosis   shock liver   HX of DVT   MICU   Cont MV, taper FIO2 as tolerated   Serial ABg, cxr   Hydration, cont pressors for BP support   transfuse PBRC, monitor CBC   monitor urinary output and renal function, corect hyperK  Post op  antibiotic prophylaxis   Surgical f/u  Monitor LFTs  Discussed with

## 2021-02-19 NOTE — PROGRESS NOTE ADULT - SUBJECTIVE AND OBJECTIVE BOX
Called to code Blue 6N, pt receiving chest compressions, intubated with 7.5 ETT, Bilateral bvrwath sounds by auscutation

## 2021-02-19 NOTE — BRIEF OPERATIVE NOTE - NSICDXBRIEFPROCEDURE_GEN_ALL_CORE_FT
PROCEDURES:  Mastectomy, with sentinel lymph node dissection 17-Feb-2021 12:29:15  Leigh Gongora  Bilateral total mastectomy 17-Feb-2021 12:28:00  Leigh Gongora  
PROCEDURES:  Washout, wound, chest 19-Feb-2021 00:02:05  Escobar Bowie  
PROCEDURES:  Evacuation of hematoma of both breasts 19-Feb-2021 17:45:31  Leigh Gongora  Mastectomy, with sentinel lymph node dissection 17-Feb-2021 12:29:15  Leigh Gongora  Bilateral total mastectomy 17-Feb-2021 12:28:00  Leigh Gongora

## 2021-02-19 NOTE — PROGRESS NOTE ADULT - SUBJECTIVE AND OBJECTIVE BOX
Patient was seen and examined at the bedside today and noted to have fluctuant right breast swelling when dressings were taken down, consistent with likely hematoma. The L breast was noted to be flat without fluctuance or evidence of hematoma filling the breast cavity. ALEJANDRO drains had all consistently had bloody output. AM hgb was 6.9, which after further resuscitation with blood products was repeated and was again 6.9. At this time the decision was made to open the patient's right breast incision at the bedside in the ICU to evacuate the hematoma, and pack with krilex to tamponade any surfaces that were oozing, as it was not likely the patient had any active bleeding. Under sterile conditions, the right breast incision was opened, and hematoma was evacuated. No active bleeding was noted. The breast cavity was irrigated, kierra was applied, and the cavity was packed with 3 krilex. At this time the decision was made to take the patient to the OR to proceed with the case. She was transferred to the OR, and anesthesia placed a right IJ introducer and a left radial A-line under sterile conditions. She was noted to be hypotensive to the 30's systolic at this time, and she was given pushes of epinephrine. Patient's blood pressure responded, and she was prepped and draped. The left breast cavity was opened, and hematoma was evacuated. The cavity was irrigated and packed with lap pads. At this time we turned our attention to the right breast. The krilex were removed with minimal saturation present, the cavity was irrigated, and no evidence of active bleeding was noted. The cavity was re-packed with krilex. We then turned back to the L breast. The lap pads were removed, and due to significant staining of the lap pads with blood, the cavity was re-packed with lap pads. Upon removal of these lap pads, no significant staining was noted, and no active bleeding was identified. The cavity was irrigated, and kierra was applied. The cavity was re-packed with krilex. The patient received multiple units of pRBCs, FFP, and platelets in the OR. A pressure dressing was unable to be applied due to high peak airway pressures and difficulty ventilating the patient. She was transported back to the ICU to undergo further resuscitation, and her ALEJANDRO drains were connected to continuous suction. Patient was on neosynephrine, vasopressin, and levophed at the end of the case and made minimal urine.     Physical exam:   Gen: intubated, not sedated  Resp: overbreathing the vent, biting at the ET tube, on vent settings  RR 28     mL    FiO2 60%   PEEP 5  Breast: dressings in place, the R breast with sutures in place with incision partially opened and the cavity packed with krilex, the L breast with staples in place with partially opened incision and cavity packed with krilex   Abd: distended, soft   Neuro: GCS3T, no cough reflex on suctioning, pupils fixed and dilated, noted to be biting the tube and spontaneously breathing on PS     Vital Signs Last 24 Hrs  T(C): 36.9 (19 Feb 2021 13:00), Max: 37 (19 Feb 2021 12:00)  T(F): 98.4 (19 Feb 2021 13:00), Max: 98.6 (19 Feb 2021 12:00)  HR: 117 (19 Feb 2021 16:44) (90 - 141)  BP: 98/51 (19 Feb 2021 14:30) (73/46 - 169/89)  BP(mean): 62 (19 Feb 2021 14:30) (53 - 108)  RR: 17 (19 Feb 2021 14:30) (0 - 28)  SpO2: 98% (19 Feb 2021 16:44) (91% - 100%)    02-19    146<H>  |  111<H>  |  19<H>  ----------------------------<  168<H>  3.5   |  15<L>  |  2.50<H>    Ca    8.0<L>      19 Feb 2021 17:04  Phos  5.9     02-19  Mg     1.5     02-19    TPro  4.6<L>  /  Alb  2.3<L>  /  TBili  1.6<H>  /  DBili  x   /  AST  1825<H>  /  ALT  670<H>  /  AlkPhos  98  02-19    CARDIAC MARKERS ( 19 Feb 2021 17:04 )  8.030 ng/mL / x     / 54305 U/L / x     / 135.6 ng/mL  CARDIAC MARKERS ( 19 Feb 2021 11:46 )  9.600 ng/mL / x     / >94546 U/L / x     / 202.2 ng/mL  CARDIAC MARKERS ( 19 Feb 2021 02:49 )  2.120 ng/mL / x     / 5220 U/L / x     / x      CARDIAC MARKERS ( 19 Feb 2021 00:03 )  1.260 ng/mL / x     / x     / x     / x      CARDIAC MARKERS ( 18 Feb 2021 20:15 )  0.415 ng/mL / x     / x     / x     / x                              12.5   8.42  )-----------( 125      ( 19 Feb 2021 17:04 )             39.5

## 2021-02-19 NOTE — BRIEF OPERATIVE NOTE - NSICDXBRIEFPOSTOP_GEN_ALL_CORE_FT
POST-OP DIAGNOSIS:  Breast hematoma 19-Feb-2021 17:46:08  Leigh Gongora  Breast cancer 17-Feb-2021 12:30:12  Leigh Gongora  
POST-OP DIAGNOSIS:  Breast cancer 17-Feb-2021 12:30:12  Leigh Gongora

## 2021-02-19 NOTE — PROGRESS NOTE ADULT - ASSESSMENT
With poor neurological response and continued bleeding, her prognosis remains guarded.    Management as per ICU staff.  Surgical intervention is not indicated at this time.

## 2021-02-20 LAB
ALBUMIN SERPL ELPH-MCNC: 1.6 G/DL — LOW (ref 3.5–5)
ALBUMIN SERPL ELPH-MCNC: 1.7 G/DL — LOW (ref 3.5–5)
ALBUMIN SERPL ELPH-MCNC: 1.8 G/DL — LOW (ref 3.5–5)
ALBUMIN SERPL ELPH-MCNC: 1.9 G/DL — LOW (ref 3.5–5)
ALBUMIN SERPL ELPH-MCNC: 2 G/DL — LOW (ref 3.5–5)
ALP SERPL-CCNC: 220 U/L — HIGH (ref 40–120)
ALP SERPL-CCNC: 225 U/L — HIGH (ref 40–120)
ALP SERPL-CCNC: 274 U/L — HIGH (ref 40–120)
ALP SERPL-CCNC: 324 U/L — HIGH (ref 40–120)
ALT FLD-CCNC: 1756 U/L DA — HIGH (ref 10–60)
ALT FLD-CCNC: 1759 U/L DA — HIGH (ref 10–60)
ALT FLD-CCNC: 2065 U/L DA — HIGH (ref 10–60)
ALT FLD-CCNC: 995 U/L DA — HIGH (ref 10–60)
ANION GAP SERPL CALC-SCNC: 23 MMOL/L — HIGH (ref 5–17)
ANION GAP SERPL CALC-SCNC: 26 MMOL/L — HIGH (ref 5–17)
ANION GAP SERPL CALC-SCNC: 27 MMOL/L — HIGH (ref 5–17)
ANION GAP SERPL CALC-SCNC: 27 MMOL/L — HIGH (ref 5–17)
ANION GAP SERPL CALC-SCNC: 30 MMOL/L — HIGH (ref 5–17)
APTT BLD: 34 SEC — SIGNIFICANT CHANGE UP (ref 27.5–35.5)
APTT BLD: 35.3 SEC — SIGNIFICANT CHANGE UP (ref 27.5–35.5)
AST SERPL-CCNC: 4010 U/L — HIGH (ref 10–40)
AST SERPL-CCNC: 4338 U/L — HIGH (ref 10–40)
AST SERPL-CCNC: 4698 U/L — HIGH (ref 10–40)
AST SERPL-CCNC: 4865 U/L — HIGH (ref 10–40)
BASE EXCESS BLDA CALC-SCNC: -13 MMOL/L — LOW (ref -2–2)
BASE EXCESS BLDA CALC-SCNC: -15.9 MMOL/L — LOW (ref -2–2)
BASE EXCESS BLDV CALC-SCNC: -16.8 MMOL/L — LOW (ref -2–2)
BASE EXCESS BLDV CALC-SCNC: -20.8 MMOL/L — LOW (ref -2–2)
BASOPHILS # BLD AUTO: 0 K/UL — SIGNIFICANT CHANGE UP (ref 0–0.2)
BASOPHILS # BLD AUTO: 0.01 K/UL — SIGNIFICANT CHANGE UP (ref 0–0.2)
BASOPHILS NFR BLD AUTO: 0 % — SIGNIFICANT CHANGE UP (ref 0–2)
BASOPHILS NFR BLD AUTO: 0.1 % — SIGNIFICANT CHANGE UP (ref 0–2)
BILIRUB SERPL-MCNC: 3.2 MG/DL — HIGH (ref 0.2–1.2)
BILIRUB SERPL-MCNC: 4.2 MG/DL — HIGH (ref 0.2–1.2)
BILIRUB SERPL-MCNC: 4.2 MG/DL — HIGH (ref 0.2–1.2)
BILIRUB SERPL-MCNC: 4.9 MG/DL — HIGH (ref 0.2–1.2)
BLOOD GAS COMMENTS ARTERIAL: SIGNIFICANT CHANGE UP
BLOOD GAS COMMENTS ARTERIAL: SIGNIFICANT CHANGE UP
BLOOD GAS COMMENTS, VENOUS: SIGNIFICANT CHANGE UP
BLOOD GAS COMMENTS, VENOUS: SIGNIFICANT CHANGE UP
BUN SERPL-MCNC: 22 MG/DL — HIGH (ref 7–18)
BUN SERPL-MCNC: 23 MG/DL — HIGH (ref 7–18)
BUN SERPL-MCNC: 24 MG/DL — HIGH (ref 7–18)
CALCIUM SERPL-MCNC: 6.6 MG/DL — LOW (ref 8.4–10.5)
CALCIUM SERPL-MCNC: 6.9 MG/DL — LOW (ref 8.4–10.5)
CALCIUM SERPL-MCNC: 7 MG/DL — LOW (ref 8.4–10.5)
CALCIUM SERPL-MCNC: 7.3 MG/DL — LOW (ref 8.4–10.5)
CALCIUM SERPL-MCNC: 7.6 MG/DL — LOW (ref 8.4–10.5)
CHLORIDE SERPL-SCNC: 105 MMOL/L — SIGNIFICANT CHANGE UP (ref 96–108)
CHLORIDE SERPL-SCNC: 108 MMOL/L — SIGNIFICANT CHANGE UP (ref 96–108)
CHLORIDE SERPL-SCNC: 108 MMOL/L — SIGNIFICANT CHANGE UP (ref 96–108)
CHLORIDE SERPL-SCNC: 109 MMOL/L — HIGH (ref 96–108)
CHLORIDE SERPL-SCNC: 110 MMOL/L — HIGH (ref 96–108)
CK SERPL-CCNC: SIGNIFICANT CHANGE UP U/L (ref 21–215)
CO2 SERPL-SCNC: 11 MMOL/L — LOW (ref 22–31)
CO2 SERPL-SCNC: 11 MMOL/L — LOW (ref 22–31)
CO2 SERPL-SCNC: 13 MMOL/L — LOW (ref 22–31)
CO2 SERPL-SCNC: 9 MMOL/L — CRITICAL LOW (ref 22–31)
CO2 SERPL-SCNC: 9 MMOL/L — CRITICAL LOW (ref 22–31)
CREAT SERPL-MCNC: 2.91 MG/DL — HIGH (ref 0.5–1.3)
CREAT SERPL-MCNC: 3.28 MG/DL — HIGH (ref 0.5–1.3)
CREAT SERPL-MCNC: 3.71 MG/DL — HIGH (ref 0.5–1.3)
CREAT SERPL-MCNC: 3.92 MG/DL — HIGH (ref 0.5–1.3)
CRP SERPL-MCNC: 0.4 MG/DL — SIGNIFICANT CHANGE UP (ref 0–0.4)
D DIMER BLD IA.RAPID-MCNC: 3408 NG/ML DDU — HIGH
D DIMER BLD IA.RAPID-MCNC: 3499 NG/ML DDU — HIGH
D DIMER BLD IA.RAPID-MCNC: 5626 NG/ML DDU — HIGH
EOSINOPHIL # BLD AUTO: 0 K/UL — SIGNIFICANT CHANGE UP (ref 0–0.5)
EOSINOPHIL # BLD AUTO: 0.08 K/UL — SIGNIFICANT CHANGE UP (ref 0–0.5)
EOSINOPHIL NFR BLD AUTO: 0 % — SIGNIFICANT CHANGE UP (ref 0–6)
EOSINOPHIL NFR BLD AUTO: 0.4 % — SIGNIFICANT CHANGE UP (ref 0–6)
FERRITIN SERPL-MCNC: 1621 NG/ML — HIGH (ref 15–150)
FIBRINOGEN PPP-MCNC: 231 MG/DL — LOW (ref 290–520)
FIBRINOGEN PPP-MCNC: 235 MG/DL — LOW (ref 290–520)
FIBRINOGEN PPP-MCNC: 265 MG/DL — LOW (ref 290–520)
GLUCOSE BLDC GLUCOMTR-MCNC: 102 MG/DL — HIGH (ref 70–99)
GLUCOSE BLDC GLUCOMTR-MCNC: 119 MG/DL — HIGH (ref 70–99)
GLUCOSE BLDC GLUCOMTR-MCNC: 126 MG/DL — HIGH (ref 70–99)
GLUCOSE BLDC GLUCOMTR-MCNC: 133 MG/DL — HIGH (ref 70–99)
GLUCOSE BLDC GLUCOMTR-MCNC: 161 MG/DL — HIGH (ref 70–99)
GLUCOSE BLDC GLUCOMTR-MCNC: 168 MG/DL — HIGH (ref 70–99)
GLUCOSE BLDC GLUCOMTR-MCNC: 63 MG/DL — LOW (ref 70–99)
GLUCOSE SERPL-MCNC: 114 MG/DL — HIGH (ref 70–99)
GLUCOSE SERPL-MCNC: 126 MG/DL — HIGH (ref 70–99)
GLUCOSE SERPL-MCNC: 137 MG/DL — HIGH (ref 70–99)
GLUCOSE SERPL-MCNC: 25 MG/DL — CRITICAL LOW (ref 70–99)
GLUCOSE SERPL-MCNC: 89 MG/DL — SIGNIFICANT CHANGE UP (ref 70–99)
HCO3 BLDA-SCNC: 10 MMOL/L — LOW (ref 23–27)
HCO3 BLDA-SCNC: 11 MMOL/L — LOW (ref 23–27)
HCO3 BLDV-SCNC: 7 MMOL/L — LOW (ref 21–29)
HCO3 BLDV-SCNC: 9 MMOL/L — LOW (ref 21–29)
HCT VFR BLD CALC: 37 % — SIGNIFICANT CHANGE UP (ref 34.5–45)
HCT VFR BLD CALC: 37.3 % — SIGNIFICANT CHANGE UP (ref 34.5–45)
HCT VFR BLD CALC: 37.3 % — SIGNIFICANT CHANGE UP (ref 34.5–45)
HCT VFR BLD CALC: 42.8 % — SIGNIFICANT CHANGE UP (ref 34.5–45)
HCT VFR BLD CALC: 43.7 % — SIGNIFICANT CHANGE UP (ref 34.5–45)
HGB BLD-MCNC: 12.6 G/DL — SIGNIFICANT CHANGE UP (ref 11.5–15.5)
HGB BLD-MCNC: 12.7 G/DL — SIGNIFICANT CHANGE UP (ref 11.5–15.5)
HGB BLD-MCNC: 12.9 G/DL — SIGNIFICANT CHANGE UP (ref 11.5–15.5)
HGB BLD-MCNC: 14.9 G/DL — SIGNIFICANT CHANGE UP (ref 11.5–15.5)
HGB BLD-MCNC: 15.3 G/DL — SIGNIFICANT CHANGE UP (ref 11.5–15.5)
HOROWITZ INDEX BLDA+IHG-RTO: 75 — SIGNIFICANT CHANGE UP
HOROWITZ INDEX BLDA+IHG-RTO: 80 — SIGNIFICANT CHANGE UP
HOROWITZ INDEX BLDV+IHG-RTO: 75 — SIGNIFICANT CHANGE UP
IMM GRANULOCYTES NFR BLD AUTO: 2 % — HIGH (ref 0–1.5)
INR BLD: 2.21 RATIO — HIGH (ref 0.88–1.16)
INR BLD: 2.73 RATIO — HIGH (ref 0.88–1.16)
LACTATE SERPL-SCNC: 16 MMOL/L — CRITICAL HIGH (ref 0.7–2)
LACTATE SERPL-SCNC: 17.4 MMOL/L — CRITICAL HIGH (ref 0.7–2)
LACTATE SERPL-SCNC: 18.6 MMOL/L — CRITICAL HIGH (ref 0.7–2)
LACTATE SERPL-SCNC: 18.9 MMOL/L — CRITICAL HIGH (ref 0.7–2)
LYMPHOCYTES # BLD AUTO: 1.02 K/UL — SIGNIFICANT CHANGE UP (ref 1–3.3)
LYMPHOCYTES # BLD AUTO: 1.12 K/UL — SIGNIFICANT CHANGE UP (ref 1–3.3)
LYMPHOCYTES # BLD AUTO: 5.6 % — LOW (ref 13–44)
LYMPHOCYTES # BLD AUTO: 6 % — LOW (ref 13–44)
MAGNESIUM SERPL-MCNC: 1.1 MG/DL — LOW (ref 1.6–2.6)
MAGNESIUM SERPL-MCNC: 1.3 MG/DL — LOW (ref 1.6–2.6)
MAGNESIUM SERPL-MCNC: 1.4 MG/DL — LOW (ref 1.6–2.6)
MAGNESIUM SERPL-MCNC: 1.4 MG/DL — LOW (ref 1.6–2.6)
MANUAL SMEAR VERIFICATION: SIGNIFICANT CHANGE UP
MCHC RBC-ENTMCNC: 29.1 PG — SIGNIFICANT CHANGE UP (ref 27–34)
MCHC RBC-ENTMCNC: 29.2 PG — SIGNIFICANT CHANGE UP (ref 27–34)
MCHC RBC-ENTMCNC: 29.6 PG — SIGNIFICANT CHANGE UP (ref 27–34)
MCHC RBC-ENTMCNC: 30 PG — SIGNIFICANT CHANGE UP (ref 27–34)
MCHC RBC-ENTMCNC: 30.1 PG — SIGNIFICANT CHANGE UP (ref 27–34)
MCHC RBC-ENTMCNC: 34 GM/DL — SIGNIFICANT CHANGE UP (ref 32–36)
MCHC RBC-ENTMCNC: 34.1 GM/DL — SIGNIFICANT CHANGE UP (ref 32–36)
MCHC RBC-ENTMCNC: 34.6 GM/DL — SIGNIFICANT CHANGE UP (ref 32–36)
MCHC RBC-ENTMCNC: 34.8 GM/DL — SIGNIFICANT CHANGE UP (ref 32–36)
MCHC RBC-ENTMCNC: 35 GM/DL — SIGNIFICANT CHANGE UP (ref 32–36)
MCV RBC AUTO: 83.9 FL — SIGNIFICANT CHANGE UP (ref 80–100)
MCV RBC AUTO: 85.4 FL — SIGNIFICANT CHANGE UP (ref 80–100)
MCV RBC AUTO: 85.7 FL — SIGNIFICANT CHANGE UP (ref 80–100)
MCV RBC AUTO: 86.9 FL — SIGNIFICANT CHANGE UP (ref 80–100)
MCV RBC AUTO: 87.1 FL — SIGNIFICANT CHANGE UP (ref 80–100)
MONOCYTES # BLD AUTO: 0.34 K/UL — SIGNIFICANT CHANGE UP (ref 0–0.9)
MONOCYTES # BLD AUTO: 0.46 K/UL — SIGNIFICANT CHANGE UP (ref 0–0.9)
MONOCYTES NFR BLD AUTO: 2 % — SIGNIFICANT CHANGE UP (ref 2–14)
MONOCYTES NFR BLD AUTO: 2.3 % — SIGNIFICANT CHANGE UP (ref 2–14)
NEUTROPHILS # BLD AUTO: 15.68 K/UL — HIGH (ref 1.8–7.4)
NEUTROPHILS # BLD AUTO: 17.94 K/UL — HIGH (ref 1.8–7.4)
NEUTROPHILS NFR BLD AUTO: 87 % — HIGH (ref 43–77)
NEUTROPHILS NFR BLD AUTO: 89.6 % — HIGH (ref 43–77)
NEUTS BAND # BLD: 5 % — SIGNIFICANT CHANGE UP (ref 0–8)
NEUTS VAC BLD QL SMEAR: SLIGHT — SIGNIFICANT CHANGE UP
NRBC # BLD: 0 /100 WBCS — SIGNIFICANT CHANGE UP (ref 0–0)
NRBC # BLD: 0 /100 — SIGNIFICANT CHANGE UP (ref 0–0)
NRBC # BLD: 2 /100 WBCS — HIGH (ref 0–0)
PCO2 BLDA: 23 MMHG — LOW (ref 32–46)
PCO2 BLDA: 23 MMHG — LOW (ref 32–46)
PCO2 BLDV: 21 MMHG — LOW (ref 35–50)
PCO2 BLDV: 23 MMHG — LOW (ref 35–50)
PH BLDA: 7.26 — LOW (ref 7.35–7.45)
PH BLDA: 7.31 — LOW (ref 7.35–7.45)
PH BLDV: 7.15 — CRITICAL LOW (ref 7.35–7.45)
PH BLDV: 7.23 — LOW (ref 7.35–7.45)
PHOSPHATE SERPL-MCNC: 4.4 MG/DL — SIGNIFICANT CHANGE UP (ref 2.5–4.5)
PHOSPHATE SERPL-MCNC: 4.4 MG/DL — SIGNIFICANT CHANGE UP (ref 2.5–4.5)
PHOSPHATE SERPL-MCNC: 5.3 MG/DL — HIGH (ref 2.5–4.5)
PLAT MORPH BLD: NORMAL — SIGNIFICANT CHANGE UP
PLATELET # BLD AUTO: 102 K/UL — LOW (ref 150–400)
PLATELET # BLD AUTO: 72 K/UL — LOW (ref 150–400)
PLATELET # BLD AUTO: 72 K/UL — LOW (ref 150–400)
PLATELET # BLD AUTO: 75 K/UL — LOW (ref 150–400)
PLATELET # BLD AUTO: 96 K/UL — LOW (ref 150–400)
PO2 BLDA: 72 MMHG — LOW (ref 74–108)
PO2 BLDA: 83 MMHG — SIGNIFICANT CHANGE UP (ref 74–108)
PO2 BLDV: 60 MMHG — HIGH (ref 25–45)
PO2 BLDV: 66 MMHG — HIGH (ref 25–45)
POTASSIUM SERPL-MCNC: 3.1 MMOL/L — LOW (ref 3.5–5.3)
POTASSIUM SERPL-MCNC: 3.5 MMOL/L — SIGNIFICANT CHANGE UP (ref 3.5–5.3)
POTASSIUM SERPL-MCNC: 3.7 MMOL/L — SIGNIFICANT CHANGE UP (ref 3.5–5.3)
POTASSIUM SERPL-MCNC: 3.7 MMOL/L — SIGNIFICANT CHANGE UP (ref 3.5–5.3)
POTASSIUM SERPL-MCNC: 3.9 MMOL/L — SIGNIFICANT CHANGE UP (ref 3.5–5.3)
POTASSIUM SERPL-SCNC: 3.1 MMOL/L — LOW (ref 3.5–5.3)
POTASSIUM SERPL-SCNC: 3.5 MMOL/L — SIGNIFICANT CHANGE UP (ref 3.5–5.3)
POTASSIUM SERPL-SCNC: 3.7 MMOL/L — SIGNIFICANT CHANGE UP (ref 3.5–5.3)
POTASSIUM SERPL-SCNC: 3.7 MMOL/L — SIGNIFICANT CHANGE UP (ref 3.5–5.3)
POTASSIUM SERPL-SCNC: 3.9 MMOL/L — SIGNIFICANT CHANGE UP (ref 3.5–5.3)
PROT SERPL-MCNC: 3.9 G/DL — LOW (ref 6–8.3)
PROT SERPL-MCNC: 4 G/DL — LOW (ref 6–8.3)
PROT SERPL-MCNC: 4.1 G/DL — LOW (ref 6–8.3)
PROT SERPL-MCNC: 4.4 G/DL — LOW (ref 6–8.3)
PROTHROM AB SERPL-ACNC: 25.3 SEC — HIGH (ref 10.6–13.6)
PROTHROM AB SERPL-ACNC: 31 SEC — HIGH (ref 10.6–13.6)
RBC # BLD: 4.25 M/UL — SIGNIFICANT CHANGE UP (ref 3.8–5.2)
RBC # BLD: 4.29 M/UL — SIGNIFICANT CHANGE UP (ref 3.8–5.2)
RBC # BLD: 4.37 M/UL — SIGNIFICANT CHANGE UP (ref 3.8–5.2)
RBC # BLD: 5.1 M/UL — SIGNIFICANT CHANGE UP (ref 3.8–5.2)
RBC # BLD: 5.1 M/UL — SIGNIFICANT CHANGE UP (ref 3.8–5.2)
RBC # FLD: 15.1 % — HIGH (ref 10.3–14.5)
RBC # FLD: 15.8 % — HIGH (ref 10.3–14.5)
RBC # FLD: 16.3 % — HIGH (ref 10.3–14.5)
RBC # FLD: 16.8 % — HIGH (ref 10.3–14.5)
RBC # FLD: 16.9 % — HIGH (ref 10.3–14.5)
RBC BLD AUTO: NORMAL — SIGNIFICANT CHANGE UP
SAO2 % BLDA: 93 % — SIGNIFICANT CHANGE UP (ref 92–96)
SAO2 % BLDA: 96 % — SIGNIFICANT CHANGE UP (ref 92–96)
SAO2 % BLDV: 89 % — HIGH (ref 67–88)
SAO2 % BLDV: 91 % — HIGH (ref 67–88)
SODIUM SERPL-SCNC: 142 MMOL/L — SIGNIFICANT CHANGE UP (ref 135–145)
SODIUM SERPL-SCNC: 144 MMOL/L — SIGNIFICANT CHANGE UP (ref 135–145)
SODIUM SERPL-SCNC: 145 MMOL/L — SIGNIFICANT CHANGE UP (ref 135–145)
SODIUM SERPL-SCNC: 147 MMOL/L — HIGH (ref 135–145)
SODIUM SERPL-SCNC: 148 MMOL/L — HIGH (ref 135–145)
TOXIC GRANULES BLD QL SMEAR: PRESENT — SIGNIFICANT CHANGE UP
VANCOMYCIN TROUGH SERPL-MCNC: 13.5 UG/ML — SIGNIFICANT CHANGE UP (ref 10–20)
WBC # BLD: 11.31 K/UL — HIGH (ref 3.8–10.5)
WBC # BLD: 17.04 K/UL — HIGH (ref 3.8–10.5)
WBC # BLD: 20 K/UL — HIGH (ref 3.8–10.5)
WBC # BLD: 3.73 K/UL — LOW (ref 3.8–10.5)
WBC # BLD: 4.06 K/UL — SIGNIFICANT CHANGE UP (ref 3.8–10.5)
WBC # FLD AUTO: 11.31 K/UL — HIGH (ref 3.8–10.5)
WBC # FLD AUTO: 17.04 K/UL — HIGH (ref 3.8–10.5)
WBC # FLD AUTO: 20 K/UL — HIGH (ref 3.8–10.5)
WBC # FLD AUTO: 3.73 K/UL — LOW (ref 3.8–10.5)
WBC # FLD AUTO: 4.06 K/UL — SIGNIFICANT CHANGE UP (ref 3.8–10.5)

## 2021-02-20 PROCEDURE — 71045 X-RAY EXAM CHEST 1 VIEW: CPT | Mod: 26

## 2021-02-20 PROCEDURE — 99291 CRITICAL CARE FIRST HOUR: CPT

## 2021-02-20 RX ORDER — POTASSIUM CHLORIDE 20 MEQ
10 PACKET (EA) ORAL
Refills: 0 | Status: COMPLETED | OUTPATIENT
Start: 2021-02-20 | End: 2021-02-20

## 2021-02-20 RX ORDER — HEPARIN SODIUM 5000 [USP'U]/ML
1 INJECTION INTRAVENOUS; SUBCUTANEOUS ONCE
Refills: 0 | Status: DISCONTINUED | OUTPATIENT
Start: 2021-02-20 | End: 2021-02-22

## 2021-02-20 RX ORDER — DEXTROSE 50 % IN WATER 50 %
50 SYRINGE (ML) INTRAVENOUS
Refills: 0 | Status: COMPLETED | OUTPATIENT
Start: 2021-02-20 | End: 2021-02-20

## 2021-02-20 RX ORDER — DEXTROSE 10 % IN WATER 10 %
1000 INTRAVENOUS SOLUTION INTRAVENOUS
Refills: 0 | Status: DISCONTINUED | OUTPATIENT
Start: 2021-02-20 | End: 2021-02-21

## 2021-02-20 RX ORDER — NOREPINEPHRINE BITARTRATE/D5W 8 MG/250ML
0.95 PLASTIC BAG, INJECTION (ML) INTRAVENOUS
Qty: 32 | Refills: 0 | Status: DISCONTINUED | OUTPATIENT
Start: 2021-02-20 | End: 2021-02-21

## 2021-02-20 RX ORDER — MAGNESIUM SULFATE 500 MG/ML
1 VIAL (ML) INJECTION ONCE
Refills: 0 | Status: COMPLETED | OUTPATIENT
Start: 2021-02-20 | End: 2021-02-20

## 2021-02-20 RX ORDER — CHLORHEXIDINE GLUCONATE 213 G/1000ML
15 SOLUTION TOPICAL EVERY 12 HOURS
Refills: 0 | Status: DISCONTINUED | OUTPATIENT
Start: 2021-02-20 | End: 2021-02-22

## 2021-02-20 RX ORDER — DEXTROSE 50 % IN WATER 50 %
50 SYRINGE (ML) INTRAVENOUS ONCE
Refills: 0 | Status: COMPLETED | OUTPATIENT
Start: 2021-02-20 | End: 2021-02-20

## 2021-02-20 RX ORDER — FUROSEMIDE 40 MG
20 TABLET ORAL
Qty: 500 | Refills: 0 | Status: DISCONTINUED | OUTPATIENT
Start: 2021-02-20 | End: 2021-02-20

## 2021-02-20 RX ORDER — PANTOPRAZOLE SODIUM 20 MG/1
40 TABLET, DELAYED RELEASE ORAL EVERY 12 HOURS
Refills: 0 | Status: DISCONTINUED | OUTPATIENT
Start: 2021-02-20 | End: 2021-02-22

## 2021-02-20 RX ORDER — FUROSEMIDE 40 MG
80 TABLET ORAL ONCE
Refills: 0 | Status: COMPLETED | OUTPATIENT
Start: 2021-02-20 | End: 2021-02-20

## 2021-02-20 RX ORDER — SODIUM CHLORIDE 9 MG/ML
1000 INJECTION, SOLUTION INTRAVENOUS
Refills: 0 | Status: DISCONTINUED | OUTPATIENT
Start: 2021-02-20 | End: 2021-02-20

## 2021-02-20 RX ORDER — SODIUM BICARBONATE 1 MEQ/ML
0.18 SYRINGE (ML) INTRAVENOUS
Qty: 150 | Refills: 0 | Status: DISCONTINUED | OUTPATIENT
Start: 2021-02-20 | End: 2021-02-21

## 2021-02-20 RX ADMIN — Medication 50 MILLILITER(S): at 09:15

## 2021-02-20 RX ADMIN — Medication 73.8 MICROGRAM(S)/KG/MIN: at 21:30

## 2021-02-20 RX ADMIN — Medication 100 GRAM(S): at 17:14

## 2021-02-20 RX ADMIN — Medication 100 MILLIEQUIVALENT(S): at 06:00

## 2021-02-20 RX ADMIN — Medication 10 MG/HR: at 04:51

## 2021-02-20 RX ADMIN — Medication 73.8 MICROGRAM(S)/KG/MIN: at 10:49

## 2021-02-20 RX ADMIN — Medication 40 MILLILITER(S): at 09:50

## 2021-02-20 RX ADMIN — Medication 73.8 MICROGRAM(S)/KG/MIN: at 17:47

## 2021-02-20 RX ADMIN — Medication 50 MEQ/KG/HR: at 01:49

## 2021-02-20 RX ADMIN — PANTOPRAZOLE SODIUM 40 MILLIGRAM(S): 20 TABLET, DELAYED RELEASE ORAL at 04:51

## 2021-02-20 RX ADMIN — Medication 100 MILLIEQUIVALENT(S): at 05:00

## 2021-02-20 RX ADMIN — Medication 100 MILLIEQUIVALENT(S): at 04:03

## 2021-02-20 RX ADMIN — Medication 50 MILLILITER(S): at 11:03

## 2021-02-20 RX ADMIN — CHLORHEXIDINE GLUCONATE 15 MILLILITER(S): 213 SOLUTION TOPICAL at 04:03

## 2021-02-20 RX ADMIN — PIPERACILLIN AND TAZOBACTAM 25 GRAM(S): 4; .5 INJECTION, POWDER, LYOPHILIZED, FOR SOLUTION INTRAVENOUS at 04:04

## 2021-02-20 RX ADMIN — Medication 20 MILLILITER(S): at 18:05

## 2021-02-20 RX ADMIN — Medication 100 GRAM(S): at 21:46

## 2021-02-20 RX ADMIN — Medication 73.8 MICROGRAM(S)/KG/MIN: at 23:29

## 2021-02-20 RX ADMIN — Medication 80 MILLIGRAM(S): at 04:03

## 2021-02-20 RX ADMIN — PANTOPRAZOLE SODIUM 40 MILLIGRAM(S): 20 TABLET, DELAYED RELEASE ORAL at 17:16

## 2021-02-20 RX ADMIN — Medication 56 MICROGRAM(S)/KG/MIN: at 01:49

## 2021-02-20 RX ADMIN — CHLORHEXIDINE GLUCONATE 15 MILLILITER(S): 213 SOLUTION TOPICAL at 17:16

## 2021-02-20 RX ADMIN — Medication 250 MILLIGRAM(S): at 17:14

## 2021-02-20 RX ADMIN — PIPERACILLIN AND TAZOBACTAM 25 GRAM(S): 4; .5 INJECTION, POWDER, LYOPHILIZED, FOR SOLUTION INTRAVENOUS at 17:43

## 2021-02-20 RX ADMIN — Medication 100 MEQ/KG/HR: at 17:48

## 2021-02-20 RX ADMIN — Medication 50 MILLILITER(S): at 11:09

## 2021-02-20 NOTE — CONSULT NOTE ADULT - SUBJECTIVE AND OBJECTIVE BOX
Patient is a 45y old  Female who presents with a chief complaint of Cardiac Arrest (2021 13:45)      HPI:    PAST MEDICAL & SURGICAL HISTORY:  S/P chemotherapy, time since greater than 12 weeks  for right breast mass via left chest [facility]- ended 10/2020    HLD (hyperlipidemia)  controlled with diet    History of drainage of abscess  right groin  - I and D    Mass of right lung  hx - sx done    Deep vein thrombosis (DVT)  left  internal jugular vein (dx 2020 on Eliquis)    Hypertension    Breast cancer  dx in 2020, s/p chemo (completed in 10/2020)    S/P  section      History of vascular access device  2020- left chest  [facility]    Mass of right lung  right video assisted thoracoscopy robotic assisted right lower lobe wedge resection 2020    S/P right oophorectomy  2015    Ovarian cyst  removed in 2008    S/P biopsy  lung 2020    H/O tubal ligation          FAMILY HISTORY:  FH: myocardial infarction  father-    FH: hypertension  mother- alive          Social Hx:  Nonsmoker, no drug or alcohol use    MEDICATIONS  (STANDING):  chlorhexidine 0.12% Liquid 15 milliLiter(s) Oral Mucosa every 12 hours  dextrose 10%. 1000 milliLiter(s) (40 mL/Hr) IV Continuous <Continuous>  heparin Lock (1,000 Units/mL) Injectable 1 Dose(s) Catheter once  heparin Lock (1,000 Units/mL) Injectable 1 Dose(s) Catheter once  norepinephrine Infusion 0.95 MICROgram(s)/kG/Min (73.8 mL/Hr) IV Continuous <Continuous>  pantoprazole  Injectable 40 milliGRAM(s) IV Push every 12 hours  piperacillin/tazobactam IVPB.. 3.375 Gram(s) IV Intermittent every 12 hours  sodium bicarbonate  Infusion 0.181 mEq/kG/Hr (100 mL/Hr) IV Continuous <Continuous>  vancomycin  IVPB 1000 milliGRAM(s) IV Intermittent every 24 hours  vancomycin  IVPB           Allergies    No Known Allergies    Intolerances        ROS: Pertinent positives in HPI, all other ROS were reviewed and are negative.      Vital Signs Last 24 Hrs  T(C): 34.4 (2021 11:45), Max: 35.6 (2021 22:30)  T(F): 94 (2021 11:45), Max: 96.1 (2021 06:00)  HR: 131 (2021 17:15) (109 - 133)  BP: 99/57 (2021 17:00) (92/53 - 131/65)  BP(mean): 66 (2021 17:00) (61 - 84)  RR: 30 (2021 17:15) (10 - 35)  SpO2: 96% (2021 17:15) (90% - 100%)        Constitutional: Intubated ventilated  HEENT: Fixed dilated no eye movements  Neck: Supple.  Respiratory: Breath sounds are clear bilaterally  Cardiovascular: S1 and S2, regular  rhythm  Gastrointestinal: soft, nontender  Extremities:  no edema  Vascular: Carotid Bruit - no  Musculoskeletal: no joint swelling/tenderness, no abnormal movements  Skin: No rashes    Neurological exam:  HF: Intubated ventilated; unresponsive to any stimulus including sternal rub  CN: Pupils 6mm fixed dilated; lo EOM in response to Dolls Eyes maneuver; Corneal reflexes absent  Motor:no spontaneous or induced movement  Sens:no response  Reflexes: Symmetric and normal . absent  Coord:  unable  Gait/Balance: Unable    NIHSS:34  1A: Level of consciousness       +3= Postures or unresponsive  1B: Ask month and age       +2= Aphasic  1C: "Blink eyes" and "Squeeze Hands"       +2= Performs 0 tasks  2: Horizontal EOMs       +2= Forzed gaze palsy: cannot be overcome  3: Visual fields       +3= Patient is b/l blind  4: Facial palsy (use grimace if obtunded)       +3= B/l complete paralysis (upper/lower face)  5A: Left arm motor drift (count out loud and use fingers to show count)       +4= No movement  5B: Right arm motor drift       +4= No movement  6A: Left leg motor drift       0= Amputation/joint fusion  6B: Right leg motor drift       +4= No movement  7: Limb ataxia (FNF/heel-shin)       0= Paralyzed  8: Sensation       +2= Coma/unresponsive  9: Language/aphasia- describe the scene (on kerry); name the items; read the sentences (on kerry)       +3= coma/unresponsive  10: Dysarthria- read the words        0= Intubated/unable to test  11: Extinction/inattention       +2= extinction to > 1 modality    MRS5      Labs:                        12.6   11.31 )-----------( 75       ( 2021 14:15 )             37.0     02-20    147<H>  |  108  |  24<H>  ----------------------------<  137<H>  3.7   |  9<LL>  |  3.71<H>    Ca    6.9<L>      2021 14:15  Phos  5.3       Mg     1.1     20    TPro  4.0<L>  /  Alb  1.8<L>  /  TBili  4.2<H>  /  DBili  x   /  AST  4338<H>  /  ALT  1759<H>  /  AlkPhos  225<H>  0220        PT/INR - ( 2021 08:14 )   PT: 25.3 sec;   INR: 2.21 ratio         PTT - ( 2021 08:14 )  PTT:35.3 sec  EEG Report:  · EEG Report      [facility] [place]  Report of Routine EEG with Video  And Report of DigitalCompressed Spectral Array Analysis    [facility]: [address] [facility], [address] [place] [address], Phone: [phone]  [facility]: [phone] [place] [address], Phone: [phone]  Office: [address] [place] [address], Phone: [phone]    Patient Name: MARIA LUISA TANG  Age: 45 year  : 1975  MRN #: [id number] Location: ICU BED 1   Referring Physician: DR CUELLAR    EEG #: [id number]  Study Date: 2021   Start Time: 9:18:16 PM     Study Duration: 30.6  		    Technical Information:					  On Instrument: -  Placement and Labeling of Electrodes:  The EEG was performed utilizing 20 channels referential EEG connections (coronal over temporal over parasagittal montage) using all standard 10-20 electrode placements with EKG.  Recording was at a sampling rate of 256 samples per second per channel.  Time synchronized digital video recording was done simultaneously with EEG recording.  A low light infrared camera was used for low light recording.  Martín and seizure detection algorithms were utilized.  CSA Technical Component:  Quantitative EEG analysis using a separate Compressed Spectral Array (CSA) software package was conducted in real-time and run at bedside after set up by the technician, digitally displaying the power of electrographic frequencies included in the 1-30Hz band using a graded color map.  This data was reviewed and interpreted independently, and is reported in a separate section below.    History:  ROUTINE EEG PERFORMED AT BEDSIDE  44 Y/O FEMALE  COR : COMATOSE  H/O : HLD, MASS OF RIGHT LUNG, HTN, BREAST CANCER, BILATERAL TOTAL MASTECTOMY  P/W : CARDIAC ARREST  PATIENT IS INTUBATED  NO HYPERVENTILATION PERFORMED DUE TO CONDITION  PHOTIC STIMULATION COMPLETED  EKG ARTIFACTS , PATIENT HAS DOUBLE MASTECTOMY THE WHOLE CHEST IS COVERED WITH DRESSING  UPPER AND LOWER EXTREMITIES STIMULATION PERFORMED  -115     Medication    No Data.      Study Interpretation:    FINDINGS:  The background was diffuse voltage suppressed and invariant to noxious stimulation.    Background Slowing:  Generalized slowing: none was present.  Focal slowing: none was present.    Epileptiform Activity:   No epileptiform discharges were present.    Events:  No clinical events were recorded.  No seizures were recorded.    Activation Procedures:   -Hyperventilation was not performed.    -Photic stimulation was performed and did not elicit any change.      Artifacts:  Intermittent myogenic and movement artifacts were noted.    ECG:  The heart rate on single channel ECG was predominantly between 110-115 BPM.    EEG Classification / Summary:  Abnormal Routine EEG Study   Voltage suppression  invariant  Clinical Impression:  There is severe generalized diffuse cerebral dysfunction that is nonspecific for etiology but consistent with h/o diffuse anoxic injury. There may be some contribution of sedative medication.  There were no epileptiform abnormalities recorded.      ________________________________________      Zachary Ball MD PhD  Director, Epilepsy Division, [facility]    		          Electronic Signatures:  Zachary Ball)  (Signed 2021 13:51)  	Authored: EEG REPORT      Last Updated: 2021 13:51 by Zachary Ball)

## 2021-02-20 NOTE — CONSULT NOTE ADULT - ASSESSMENT
ANOXIC ENCEPHALOPATHY VS HEMORRHAGE  RECOMMEND CT HEAD    A/P:  
45 year-old woman with MARTINA 2/2 ATN following cardiac arrest.  Acidosis due to lactic acidosis.    1. MARTINA 2/2 ATN- s/p cardiac arrest.  Minimal urine output.  Pt becomming anasarcic.  Continue sodium bicarbonate gtt for acidosis.  Would try lasix gtt for volume management.  However, doubt if urine output remains low, would consider hemodialysis for volume management. If pt does make urine, would send urinalysis with microscopic analysis.   Dose Abx for eGFR <20. Check vancomycin level tomorrow.    2. Acidosis- due to lactic acidosis.  HD will temporize acidosis for a short time, but pH will drop again as soon as HD is stopped. CVVHDF not available here and pt is not stable enough for transfer. Continue sodium bicarbonate gtt (now successfully buffering acidosis, probably indicating further improvement in lactic acidosis). Continue care per ICU team.    3. Anasarca- trial of lasix gtt for now. Will consider HD tomorrow for volume management if not successful.    4. Electrolyte abnormalities  a. Hypocalcemia- would replete with calcium chloride through central line.  b. Hypomagnesemia- replete IV  c. Hyperphosphatemia- in the setting of renal failure.  At this time, no need to treat in the acute setting.  
46 yo female with hx of HTN, breast CA (dx in 7/2020) s/p chemo (completed in 10/2020), DVt  LIJ(8/2020 on eliquis)  was admitted to Surgical unit for elective for  right breast total mastectomy with preop needle localization and right breast sentinel node bx, left breast prophylactic total mastectomy with left breast preop needle localization on 2/17/2021. Post OR pt was RRT on 2/17 night for profuse diaphoresis and lightheadedness and hypotension. Pt was given IVF. On 2/18 her Hb in am dropped to 8 from 11 and pt was transfused 2 RBCs .Around 19:45 pt was RRT as pt is found unresponsive and  then went pulseless. Code blue was called and CPR was initiated. R femoral CVC was placed. ROSC was achieved after 8 mins( 3 rounds of Epi+ sod bicarb x1, D50 x 1, rhythm was asystole) . Pt was profusely bleeding in the ALEJANDRO drains( 500 cc in total from 7am-7pm shift).  CBC returned with hemoglobin 5.1. Decision was therefore made to take the patient to the operating room to pursue active bleeding. Patient taken to OR, the left mastectomy site was opened. Two small pumping vessels were found and ligated. Patient appeared to be in DIC and had blood oozing from bare surfaces including around her central line. The surgical site was promptly closed after confirmation of no continued bleeding. She was taken to ICU for continued resuscitation.  2 given preop in ICU, 2 U PRBC given intraop with 4 of FFP and 1 of platelets. Urine output was minimal during the case. Pt came back to ICU post OR     Pt admitted to ICU post cardiac  Arrest and Hemorrhagic shock and Acute renal failure         Assessment:  1. Cardiac Arrest     2. Hemorrhagic Shock   3. Acute Renal failure   4. Lactic acidosis   5. Shock liver   6. Breast Ca  b/l Mastectomy  7. DVT LIJ   8. HTN      Plan:    =====================[CNS ]==============================  # Acute Encephalopathy  :   - AAOx 3  at baseline  - unresponsive s/p cardiac arrest , currently not triggering vent, no corneal reflexes   - Monitor mental status off sedation   - will get CTH to assess the brain injury once pt is more stable     =====================[CVS ]===============================  # Cardiac Arrest :   - downtime 8 mins   - no hypothermia protocol due to active bleeding   - holding off Aspirin due to bleeding   - Trend trop   - f/u Echo   - cardio consult Dr Guillaume     # Hemorrhagic Shock :   - s/p b/l mastectomy 2/17 , 4 ALEJANDRO drains in place , oozing blood   - Hb 11-->8-->2rbc-->5--> 4rbc--->7-->2rbc  - s/p 4 ffp and 1 plt intraop   - will give 2 rbc , 2 ffp , 1 plt and 10 units of cryo   - monitor CBC q4 for now   - monitor ALEJANDRO drain discharge   - c/w vasopressor for BP support   - Hold BP medications    =====================[RESP ]==============================  # Acute Hypoxic Resp Failure :   - Intubated during cardiac arrest   - c/w mech vent   - f/u ABG and adjust vent setting     =====================[ GI ]================================  # Shock Liver :   - due to hypovolemic shock   - LFts trending up    - NPO  for now     ====================[ RENAL ]=============================   # Acute Renal failure :   - likely combination of pre-renal and ATN now given hypotension   - Hyperkalemia K 6 s/p insulin regular and ca   - HCO3 11 s/p Bicarb push 2 amp and started on bicarb ggt   - Monitor Ph, BMP q4 for now   - monitor urine output   - Monitor electrolytes     # Lactic Acidosis :   - lactate 22-->17   - likely 2/2 shock   -cont to trend     =====================[ ID ]================================  # B/l Mastectomy :   - started on ancef as per surgery recc     ===================[ HEME/ONC ]===========================  # Anemia  :   -  plan as above   - hold AC and aspirin    # Thrombocytopenia   - monitor and transfuse plts as needed     # DIC :   - fibrinogen 107   - will transfuse 10 U of cryo   - recheck fibrinogen     # Breast ca s/p b/l Mastectomy :   - s/p Left mastectomy washout POD 0   - monitor ALEJANDRO drain discharge   - c/w dressing   - Monitor cbc and transfuse as needed   - c/w Prophylactic ancef   - Surgery following Dr Calles     =====================[ ENDO ]=============================  # No history of DM  - target CBG < 180  - FS q4 while NPO  - Start diet when possible    ==================[ PROPHYLAXIS ]==========================   # Dvt :SCD , holding AC due to hemorrhagic shock   # Gi : Protonix     ====================[ DISPO ]==============================   - Monitor in ICU     ===================[ PROGNOSIS ]===========================  - Guarded     
44 yo female with hx of HTN, breast CA (dx in 7/2020) s/p chemo (completed in 10/2020), DVt  LIJ(8/2020 on eliquis)  was admitted to Surgical unit for elective for  right breast total mastectomy with preop needle localization and right breast sentinel node bx, left breast prophylactic total mastectomy with left breast preop needle localization on 2/17/2021Code blue was called and CPR was initiated. R femoral CVC was placed. ROSC was achieved after 8 mins( 3 rounds of Epi+ sod bicarb x1, D50 x 1, rhythm was asystole) . Pt was profusely bleeding in the ALEJANDRO drains( 500 cc in total from 7am-7pm shift).  CBC returned with hemoglobin 5.1. Decision was therefore made to take the patient to the operating room to pursue active bleeding. Patient taken to OR, the left mastectomy site was opened. Two small pumping vessels were found and ligated. Patient appeared to be in DIC and had blood oozing from bare surfaces including around her central line. The surgical site was promptly closed after confirmation of no continued bleeding. She was taken to ICU for continued resuscitation.  2 given preop in ICU, 2 U PRBC given intraop with 4 of FFP and 1 of platelets. Urine output was minimal during the case.       Assessment:  1. Cardiac Arrest     2. Hemorrhagic Shock   3. Acute Renal failure   4. Lactic acidosis   5. Shock liver   6. Breast Ca  b/l Mastectomy  7. DVT LIJ   8. HTN      Plan:    =====================[CNS ]==============================  # Acute Encephalopathy  :   - AAOx 3  at baseline  - unresponsive s/p cardiac arrest , currently not triggering vent, no corneal reflexes   - Monitor mental status off sedation   - will get CTH to assess the brain injury once pt is more stable     =====================[CVS ]===============================  # Cardiac Arrest :   - downtime 8 mins   - no hypothermia protocol due to active bleeding   - holding off Aspirin due to bleeding   - Elevated troponins likely Type 2 MI -Demand    # Hemorrhagic Shock :   - s/p b/l mastectomy 2/17 , 4 ALEJANDRO drains in place , oozing blood   - Hb 11-->8-->2rbc-->5--> 4rbc--->7-->2rbc  - s/p 4 ffp and 1 plt intraop   - will give 2 rbc , 2 ffp , 1 plt and 10 units of cryo   - monitor CBC q4 for now   - monitor ALEJANDRO drain discharge   - c/w vasopressor for BP support   - Hold BP medications    =====================[RESP ]==============================  # Acute Hypoxic Resp Failure :   - Intubated during cardiac arrest   - c/w mech vent   - f/u ABG and adjust vent setting     =====================[ GI ]================================  # Shock Liver :   - due to hypovolemic shock   - LFts trending up    - NPO  for now     ====================[ RENAL ]=============================   # Acute Renal failure :   - likely combination of pre-renal and ATN now given hypotension   - Hyperkalemia K 6 s/p insulin regular and ca   - HCO3 11 s/p Bicarb push 2 amp and started on bicarb ggt   - Monitor Ph, BMP q4 for now   - monitor urine output   - Monitor electrolytes     # Lactic Acidosis :   - lactate 22-->17   - likely 2/2 shock   -cont to trend     =====================[ ID ]================================  # B/l Mastectomy :   - started on ancef as per surgery recc     ===================[ HEME/ONC ]===========================  # Anemia  :   -  plan as above   - hold AC and aspirin    # Thrombocytopenia   - monitor and transfuse plts as needed     # DIC :   - fibrinogen 107   - will transfuse 10 U of cryo   - recheck fibrinogen     # Breast ca s/p b/l Mastectomy :   - s/p Left mastectomy washout POD 0   - monitor ALEJANDRO drain discharge   - c/w dressing   - Monitor cbc and transfuse as needed   - c/w Prophylactic ancef   - Surgery following Dr Calles     =====================[ ENDO ]=============================  # No history of DM  - target CBG < 180  - FS q4 while NPO  - Start diet when possible    ==================[ PROPHYLAXIS ]==========================   # Dvt :SCD , holding AC due to hemorrhagic shock   # Gi : Protonix     ====================[ DISPO ]==============================   - Monitor in ICU     ===================[ PROGNOSIS ]===========================  -Poor

## 2021-02-20 NOTE — PROGRESS NOTE ADULT - ASSESSMENT
45 year-old woman with MARTINA 2/2 ATN following cardiac arrest.  Acidosis due to lactic acidosis.    1. MARTINA 2/2 ATN- s/p cardiac arrest.  Minimal urine output.  Pt becoming anasarcic.  Stopped sodium bicarbonate gtt for some time as pH is acceptable and wanted to minimize fluids in, but now restarted as serum bicarbonate dropped significantly.    Dose Abx for eGFR <20. Vancomycin level borderline low.  May consider increasing dose to 1.25g  daily or give one extra dose of 500mg.  F/u ID as well for suggested dosing    Pt with high/rising INR today.  Plan was initially to try to place HD catheter and dialyze to optimize volume status, but given high INR/risk of bleeding, risks outweigh benefits at this time.  Pt was given FFPs to try to improve INR and prepare for possible HD catheter placement tomorrow.      2. Acidosis- due to lactic acidosis.  HD will temporize acidosis for a short time, but pH will drop again as soon as HD is stopped. pH is acceptable. Sodium bicarbonate gtt was stopped for a while, now restarted.  Continue.  Monitor acid-base status. Continue care per ICU team.    3. Anasarca- trial of lasix gtt was unsuccessful.  Pt was given a bolus of sodium bicarbonate today, also unsuccessful.  Once INR has improved, will UF with HD.       4. Electrolyte abnormalities  a. Hypocalcemia- would replete with calcium chloride through central line.  b. Hypomagnesemia- replete IV  c. Hyperphosphatemia- in the setting of renal failure, improved today.  At this time, no need to treat in the acute setting.

## 2021-02-20 NOTE — PROGRESS NOTE ADULT - SUBJECTIVE AND OBJECTIVE BOX
Patient seen and examined at the bedside. She is POD1 from b/l breast exploration, evacuation of hematoma, washout, and packing placement. Patient's condition remains critical. She continues on levophed 0.95 mcg/kg. LFTs, creatinine, and lactate continue to trend up, and patient's respiratory status worsening requiring increased ventilatory support. She is making about 10 cc of urine/hour. Right and left breast drains have put out about 400 cc of bloody, though more serous than before, fluid since the OR. Appreciate hematology and nephrology recs.     Physical exam:   Gen: intubated, not sedated  Resp:  vent settings  RR 30     mL    FiO2 75%   PEEP 6, not noted to be overbreathing the vent   Breast: dressings in place are c/d/i with mild staining of the L breast dressing, 4 ALEJANDRO drains in place, 3 to wall canister suction, the fourth with air leak from the end inside the breast  Abd: less distended, soft   Neuro: GCS3T, no cough reflex on suctioning, pupils fixed and dilated    Vital Signs Last 24 Hrs  T(C): 35.6 (20 Feb 2021 06:00), Max: 37 (19 Feb 2021 12:00)  T(F): 96.1 (20 Feb 2021 06:00), Max: 98.6 (19 Feb 2021 12:00)  HR: 116 (20 Feb 2021 08:33) (110 - 140)  BP: 95/59 (20 Feb 2021 08:00) (82/50 - 131/79)  BP(mean): 67 (20 Feb 2021 08:00) (56 - 92)  RR: 30 (20 Feb 2021 08:15) (0 - 35)  SpO2: 93% (20 Feb 2021 08:33) (90% - 100%)                          15.3   4.06  )-----------( x        ( 20 Feb 2021 08:14 )             43.7   02-20    148<H>  |  110<H>  |  24<H>  ----------------------------<  25<LL>  3.5   |  11<L>  |  3.28<H>    Ca    7.3<L>      20 Feb 2021 08:14  Phos  4.4     02-20  Mg     1.3     02-20    TPro  4.1<L>  /  Alb  1.9<L>  /  TBili  4.2<H>  /  DBili  x   /  AST  4698<H>  /  ALT  1756<H>  /  AlkPhos  324<H>  02-20

## 2021-02-20 NOTE — CHART NOTE - NSCHARTNOTEFT_GEN_A_CORE
Contacted patient's  Sebastian Montoya regarding patient's worsening renal function and anuric status at this time. I informed him that the next appropriate course of action given her renal failure would be initiation of hemodialysis (after discussion with Dr. Rosas). Mr. Montoya agrees and says he wants to continue pursuing any and all life saving measures. Dr. Rossa updated. Vascular surgery consulted for placement of Shiley catheter.

## 2021-02-20 NOTE — PROGRESS NOTE ADULT - SUBJECTIVE AND OBJECTIVE BOX
[facility] NEPHROLOGY- PROGRESS NOTE    Patient is a 45y Female with breast cancer who presented to the hospital for bilateral mastectomy.  Following surgery, pt was found to have bleeding and cardiac arrest.  Pt has had two returns to the OR for evacuation of hematoma, effort to stop bleeding.  She has received many units of PRBCs, FFPs, and Hb had not been rising appropriately, though now seems to have risen and stabilized.  Since cardiac arrest, pt has has poor urine output and this is worsening.   She has a severe acidosis with elevated lactate levels.  Pt is on Abx (vancomycin/zosyn), norepinephrine, and sodium bicarbonate gtt.      Pt with high/rising INR today.  Plan was initially to try to place HD catheter and dialyze to optimize volume status, but given high INR/risk of bleeding, risks outweigh benefits at this time.  Pt was given FFPs to try to improve INR and prepare for possible HD catheter placement tomorrow.    REVIEW OF SYSTEMS: unable to obtain.       ICU Vital Signs Last 24 Hrs  T(C): 36.3 (20 Feb 2021 17:00), Max: 36.3 (20 Feb 2021 17:00)  T(F): 97.3 (20 Feb 2021 17:00), Max: 97.3 (20 Feb 2021 17:00)  HR: 130 (20 Feb 2021 21:15) (109 - 133)  BP: 109/55 (20 Feb 2021 21:00) (92/53 - 131/65)  BP(mean): 68 (20 Feb 2021 21:00) (61 - 82)  ABP: 97/59 (20 Feb 2021 21:15) (70/42 - 148/78)  ABP(mean): 74 (20 Feb 2021 21:15) (53 - 102)  RR: 30 (20 Feb 2021 21:15) (11 - 32)  SpO2: 97% (20 Feb 2021 21:15) (92% - 100%)      PHYSICAL EXAM:  Constitutional: intubated, not sedated, but nonresponsive.  HEENT: +facial edema, anicteric sclera, +ETT  Respiratory: mechanical BS B with some wheezing on L anteriorly  Cardiovascular: S1, S2, RRR  Gastrointestinal: BS+, soft, ND  Extremities: No cyanosis or clubbing. +Anasarca  Neurological: nonresponsive despite absence of sedation  : + chandler, 20ml yellow urine, bladder not palpable.   Skin: No rashes    LABS:  02-20    144  |  108  |  24<H>  ----------------------------<  126<H>  3.9   |  9<LL>  |  3.92<H>    Ca    7.0<L>      20 Feb 2021 20:19  Phos  4.4     02-20  Mg     1.4     02-20    TPro  3.9<L>  /  Alb  1.7<L>  /  TBili  4.9<H>  /  DBili      /  AST  4865<H>  /  ALT  2065<H>  /  AlkPhos  220<H>  02-20    Creatinine Trend: 3.92 <--, 3.71 <--, 3.28 <--, 2.91 <--, 2.50 <--, 2.62 <--, 2.71 <--, 2.06 <--, 1.77 <--, 1.83 <--, 1.74 <--, 1.02 <--                        12.9   17.04 )-----------( 72       ( 20 Feb 2021 20:19 )             37.3     Vancomycin Level, Trough (02.20.21 @ 16:05)    Vancomycin Level, Trough: 13.5    Blood Gas Profile - Venous (02.20.21 @ 20:40)    pH, Venous: 7.23    pCO2, Venous: 23 mmHg    pO2, Venous: 60 mmHg    HCO3, Venous: 9 mmol/L    Base Excess, Venous: -16.8 mmol/L    Oxygen Saturation, Venous: 89 %    Blood Gas Comments, Venous: Venous Gas    Lactate, Blood (02.20.21 @ 20:19)    Lactate, Blood: 17.4: TYPE:(C=Critical, N=Notification, A=Abnormal) C  Lactate, Blood (02.20.21 @ 14:15)    Lactate, Blood: 18.9: TYPE:(C=Critical, N=Notification, A=Abnormal) C  Lactate, Blood (02.19.21 @ 17:04)    Lactate, Blood: 13.4: TYPE:(C=Critical, N=Notification, A=Abnormal) _  Lactate, Blood (02.19.21 @ 11:46)    Lactate, Blood: 17.0: TYPE:(C=Critical, N=Notification, A=Abnormal)      < from: Xray Chest 1 View- PORTABLE-Routine (Xray Chest 1 View- PORTABLE-Routine in AM.) (02.20.21 @ 09:35) >    EXAM:  XR CHEST PORTABLE ROUTINE 1V                            PROCEDURE DATE:  02/20/2021          INTERPRETATION:  AP semierect chest on February 20, 2021 at 8:32 AM. Patient had a cardiac arrest was intubated.    Heart size is within normal limits.    Endotracheal tube, right jugular line, left-sided [id number], and persistent mid lateral lung infiltrates remain.    Presently infiltrates have increased from February 19.    The present film also shows a nasogastric tube inserted a left chest tube is present on both studies.    IMPRESSION: Increasing infiltrates with pleural component particularly on the right.              SUPRIYA STEELE M.D., ATTENDING RADIOLOGIST  This document has been electronically signed. Feb 20 2021 12:57PM    < end of copied text >

## 2021-02-20 NOTE — CHART NOTE - NSCHARTNOTEFT_GEN_A_CORE
Received call from Nilesh from the [facility] in [place] inquiring about the pt's medical status. Informed him pt is still on the ventilator. No other information provided.

## 2021-02-20 NOTE — PROGRESS NOTE ADULT - ASSESSMENT
46 yo female with hx of HTN, breast CA (dx in 7/2020) s/p chemo (completed in 10/2020), DVt  [facility](8/2020 on eliquis)  was admitted to Surgical unit for elective for  right breast total mastectomy with preop needle localization and right breast sentinel node bx, left breast prophylactic total mastectomy with left breast preop needle localization on 2/17/2021. Post OR pt was RRT on 2/17 night for profuse diaphoresis and lightheadedness and hypotension. Pt was given IVF. On 2/18 her Hb in am dropped to 8 from 11 and pt was transfused 2 RBCs .Around 19:45 pt was RRT as pt is found unresponsive and  then went pulseless. Code blue was called and CPR was initiated. R femoral CVC was placed. ROSC was achieved after 8 mins( 3 rounds of Epi+ sod bicarb x1, D50 x 1, rhythm was asystole) . Pt was profusely bleeding in the ALEJANDRO drains( 500 cc in total from 7am-7pm shift).  CBC returned with hemoglobin 5.1. Decision was therefore made to take the patient to the operating room to pursue active bleeding. Patient taken to OR, the left mastectomy site was opened. Two small pumping vessels were found and ligated. Patient appeared to be in DIC and had blood oozing from bare surfaces including around her central line. The surgical site was promptly closed after confirmation of no continued bleeding. She was taken to ICU for continued resuscitation.     Pt admitted to ICU post cardiac  Arrest and Hemorrhagic shock and Acute renal failure         Assessment:  1. Cardiac Arrest     2. Hemorrhagic Shock   3. Acute Renal failure   4. Lactic acidosis   5. Shock liver   6. Breast Ca  b/l Mastectomy  7. DVT [facility]   8. HTN      Plan:    =====================[CNS ]==============================  # Acute Encephalopathy  :   - unresponsive s/p cardiac arrest , currently not triggering vent, no corneal reflexes   - Monitor mental status off sedation   - will get CTH to assess the brain injury once pt is more stable   - Neuro, Dr. Angel consulted    =====================[CVS ]===============================  # Cardiac Arrest :   - ROSC achieved after 8 mins   - no hypothermia protocol due to active hemorrhage  - holding off Aspirin due to bleeding   - Trend trop   - f/u Echo   - cardio consult Dr Maher- cardiac arrest likely due to hemorrhagic shock    # Hemorrhagic Shock :   - s/p b/l mastectomy 2/17 , 4 ALEJANDRO drains in place , oozing blood   - Hemoglobin remains depressed despite multiple units of prbc, plasma, platelet.  - monitor CBC q4 for now   - monitor ALEJANDRO drain discharge   - c/w vasopressor for BP support   - Hold BP medications    =====================[RESP ]==============================  # Acute Hypoxic Resp Failure :   - Intubated during cardiac arrest   - c/w mech vent   - AGMA due to lactate acidosis  - f/u ABG and adjust vent setting     =====================[ GI ]================================  # Shock Liver :   - due to hypovolemic shock   - LFts trending up    - NPO  for now   - Correct underlying hypotension    ====================[ RENAL ]=============================   # Acute Renal failure :   - likely prerenal given hypotension   - metabolic acidosis on bicarb drip. Hold further fluids   - Monitor Ph, BMP q4 for now   - monitor urine output   - Monitor electrolytes   Nephro, Dr. Rosas consulted    # Lactic Acidosis :   - lactate continues to be elevated  - likely 2/2 shock   -cont to trend     =====================[ ID ]================================  # B/l Mastectomy :   - started on ancef as per surgery recc     ===================[ HEME/ONC ]===========================  # Anemia  :   -  Due to bleeding vessels in the breast  - Patient returning to OR for control of bleeding  - hold AC and aspirin    # Thrombocytopenia   - monitor and transfuse plts as needed     # DIC :   - fibrinogen 107   - s/p transfusion 10 U of cryo  - Heme/onc QMA, Dr. Blancas consulted     # Breast ca s/p b/l Mastectomy :   - s/p Left mastectomy washout POD1  - B/L masectomy POD2   - monitor ALEJANDRO drain discharge   - c/w dressing   - Monitor cbc and transfuse as needed   - c/w Prophylactic ancef   - Surgery following Dr Calles     =====================[ ENDO ]=============================  # No history of DM  - target CBG < 180  - FS q4 while NPO  - Start diet when possible    ==================[ PROPHYLAXIS ]==========================   # Dvt :SCD , holding AC due to hemorrhagic shock   # Gi : Protonix     ====================[ DISPO ]==============================   - Monitor in ICU     ===================[ PROGNOSIS ]===========================  - Guarded      46 yo female with hx of HTN, breast CA (dx in 7/2020) s/p chemo (completed in 10/2020), DVt  [facility](8/2020 on eliquis)  was admitted to Surgical unit for elective for  right breast total mastectomy with preop needle localization and right breast sentinel node bx, left breast prophylactic total mastectomy with left breast preop needle localization on 2/17/2021. Post OR pt was RRT on 2/17 night for profuse diaphoresis and lightheadedness and hypotension. Pt was given IVF. On 2/18 her Hb in am dropped to 8 from 11 and pt was transfused 2 RBCs .Around 19:45 pt was RRT as pt is found unresponsive and  then went pulseless. Code blue was called and CPR was initiated. R femoral CVC was placed. ROSC was achieved after 8 mins( 3 rounds of Epi+ sod bicarb x1, D50 x 1, rhythm was asystole) . Pt was profusely bleeding in the ALEJANDRO drains( 500 cc in total from 7am-7pm shift).  CBC returned with hemoglobin 5.1. Decision was therefore made to take the patient to the operating room to pursue active bleeding. Patient taken to OR, the left mastectomy site was opened. Two small pumping vessels were found and ligated. Patient appeared to be in DIC and had blood oozing from bare surfaces including around her central line. The surgical site was promptly closed after confirmation of no continued bleeding. She was taken to ICU for continued resuscitation.     Pt admitted to ICU post cardiac  Arrest and Hemorrhagic shock and Acute renal failure         Assessment:  1. Cardiac Arrest     2. Hemorrhagic Shock   3. Acute Renal failure   4. Lactic acidosis   5. Shock liver   6. Breast Ca  b/l Mastectomy  7. DVT [facility]   8. HTN      Plan:    =====================[CNS ]==============================  # Acute Encephalopathy  :   - unresponsive s/p cardiac arrest , currently not triggering vent, no corneal reflexes   - Monitor mental status off sedation   - will get CTH to assess the brain injury once pt is more stable   - Neuro, Dr. Angel consulted    =====================[CVS ]===============================  # Cardiac Arrest :   - ROSC achieved after 8 mins   - no hypothermia protocol due to active hemorrhage  - holding off Aspirin due to bleeding   - Trend trop   - f/u Echo   - cardio consult Dr Maher- cardiac arrest likely due to hemorrhagic shock    # Hemorrhagic Shock :   - s/p b/l mastectomy 2/17 , 4 ALEJANDRO drains in place , oozing blood   - Hemoglobin remains depressed despite multiple units of prbc, plasma, platelet.  - monitor CBC q4 for now   - monitor ALEJANDRO drain discharge   - c/w vasopressor for BP support   - Hold BP medications    =====================[RESP ]==============================  # Acute Hypoxic Resp Failure :   - Intubated during cardiac arrest   - c/w mech vent   - AGMA due to lactate acidosis  - f/u ABG and adjust vent setting     =====================[ GI ]================================  # Shock Liver :   - due to hypovolemic shock   - LFts trending up    - NPO  for now   - Correct underlying hypotension    ====================[ RENAL ]=============================   # Acute Renal failure :   - likely prerenal given hypotension   - metabolic acidosis s/p bicarb drip. Bicarb drip discontinued 2/20 as acidosis resolved  - Monitor Ph, BMP q4 for now   - monitor urine output   - Monitor electrolytes   - HD on hold for bleeding risk  NephroDr. Rosas consulted    # Lactic Acidosis :   - lactate continues to be elevated  - likely 2/2 shock   -cont to trend     =====================[ ID ]================================  # B/l Mastectomy :   - started on ancef as per surgery recc     ===================[ HEME/ONC ]===========================  # Anemia  :   -  Due to bleeding vessels in the breast  - Patient returning to OR for control of bleeding  - hold AC and aspirin    # Thrombocytopenia   - monitor and transfuse plts as needed     # DIC :   - fibrinogen 107   - s/p transfusion 10 U of cryo  - Heme/onc QMA, Dr. Blancas consulted     # Breast ca s/p b/l Mastectomy :   - s/p Left mastectomy washout POD1  - B/L masectomy POD2   - monitor ALEJANDRO drain discharge   - c/w dressing   - Monitor cbc and transfuse as needed   - c/w Prophylactic ancef   - Surgery following Dr Calles     =====================[ ENDO ]=============================  # No history of DM  - target CBG < 180  - FS q4 while NPO  - Start diet when possible    ==================[ PROPHYLAXIS ]==========================   # Dvt :SCD , holding AC due to hemorrhagic shock   # Gi : Protonix     ====================[ DISPO ]==============================   - Monitor in ICU     ===================[ PROGNOSIS ]===========================  - Guarded

## 2021-02-20 NOTE — PROGRESS NOTE ADULT - SUBJECTIVE AND OBJECTIVE BOX
INTERVAL HPI/OVERNIGHT EVENTS:   no acute events over night   s/p wound exploration and left sided hematoma evacuation( 2L) on 2/18  s/p second wound exploration on 2/19, Hematomas of L and R breast were evacuated. No fresh bleeding identified, only oozing. The breast cavities were packed with krilex and left open  Vent: 28/450/60/5/88/95  7.06/40/89/11  Hb 8->5.1-->7.9-->6.9? 6.9? 7.7? 12.5->14  Lactate: 17.6>17>13.4  Trop: 0.415>1.26>2.12>9.6?8   CK: 5220>14K>13k   RF removed          PRESSORS: [ ] YES [ ] NO  WHICH:    ANTIBIOTICS:                  DATE STARTED:  ANTIBIOTICS:                  DATE STARTED:  ANTIBIOTICS:                  DATE STARTED:    Antimicrobial:  piperacillin/tazobactam IVPB.. 3.375 Gram(s) IV Intermittent every 12 hours  vancomycin  IVPB 1000 milliGRAM(s) IV Intermittent every 24 hours  vancomycin  IVPB        Cardiovascular:  norepinephrine Infusion 1.5 MICROgram(s)/kG/Min IV Continuous <Continuous>    Pulmonary:    Hematalogic:    Other:  pantoprazole  Injectable 40 milliGRAM(s) IV Push daily  sodium bicarbonate  Infusion 0.09 mEq/kG/Hr IV Continuous <Continuous>    norepinephrine Infusion 1.5 MICROgram(s)/kG/Min IV Continuous <Continuous>  pantoprazole  Injectable 40 milliGRAM(s) IV Push daily  piperacillin/tazobactam IVPB.. 3.375 Gram(s) IV Intermittent every 12 hours  sodium bicarbonate  Infusion 0.09 mEq/kG/Hr IV Continuous <Continuous>  vancomycin  IVPB 1000 milliGRAM(s) IV Intermittent every 24 hours  vancomycin  IVPB        Drug Dosing Weight  Height (cm): 165.1 (17 Feb 2021 07:25)  Weight (kg): 82.9 (19 Feb 2021 08:00)  BMI (kg/m2): 30.4 (19 Feb 2021 08:00)  BSA (m2): 1.9 (19 Feb 2021 08:00)    CENTRAL LINE: [ ] YES [ ] NO  LOCATION:         COATS: [ ] YES [ ] NO          A-LINE:  [ ] YES [ ] NO  LOCATION:             ICU Vital Signs Last 24 Hrs  T(C): 35.6 (19 Feb 2021 22:30), Max: 37 (19 Feb 2021 12:00)  T(F): 96 (19 Feb 2021 22:30), Max: 98.6 (19 Feb 2021 12:00)  HR: 126 (20 Feb 2021 00:01) (90 - 141)  BP: 111/69 (20 Feb 2021 00:00) (73/46 - 132/78)  BP(mean): 79 (20 Feb 2021 00:00) (53 - 92)  ABP: 106/68 (20 Feb 2021 00:00) (95/59 - 149/91)  ABP(mean): 82 (20 Feb 2021 00:00) (46 - 112)  RR: 32 (20 Feb 2021 00:00) (0 - 35)  SpO2: 94% (20 Feb 2021 00:01) (90% - 100%)      ABG - ( 19 Feb 2021 21:10 )  pH, Arterial: 7.32  pH, Blood: x     /  pCO2: 24    /  pO2: 52    / HCO3: 12    / Base Excess: -12.6 /  SaO2: 90                    02-18 @ 07:01  -  02-19 @ 07:00  --------------------------------------------------------  IN: 4776.5 mL / OUT: 883 mL / NET: 3893.5 mL        Mode: AC/ CMV (Assist Control/ Continuous Mandatory Ventilation)  RR (machine): 32  TV (machine): 500  FiO2: 80  PEEP: 5  ITime: 1  MAP: 17  PIP: 35      PHYSICAL EXAM:  GENERAL: Moderate obese F intubated and unresponsive   HEAD:  Atraumatic, Normocephalic  EYES: Dilated, not responsive to light    ENMT: ETT in place   NERVOUS SYSTEM: Unarousable despite physical stimulation, Pupils fixed and non responsive, no gag reflex   CHEST/LUNG: clear to auscultate b/l  No rales, rhonchi, wheezing, or rubs. Dressing in place with active oozing from the ALEJANDRO drains. Barehugger in place  : Coats in place with minimal urine  HEART: s1/s2 tachycardic  ABDOMEN: Soft,  Nondistended; Bowel sounds present  SKIN: bleeding from femoral CVC        LABS:  CBC Full  -  ( 19 Feb 2021 21:11 )  WBC Count : 3.94 K/uL  RBC Count : 4.86 M/uL  Hemoglobin : 14.0 g/dL  Hematocrit : 42.4 %  Platelet Count - Automated : 117 K/uL  Mean Cell Volume : 87.2 fl  Mean Cell Hemoglobin : 28.8 pg  Mean Cell Hemoglobin Concentration : 33.0 gm/dL  Auto Neutrophil # : 3.07 K/uL  Auto Lymphocyte # : 0.72 K/uL  Auto Monocyte # : 0.12 K/uL  Auto Eosinophil # : 0.00 K/uL  Auto Basophil # : 0.02 K/uL  Auto Neutrophil % : 77.9 %  Auto Lymphocyte % : 18.3 %  Auto Monocyte % : 3.0 %  Auto Eosinophil % : 0.0 %  Auto Basophil % : 0.5 %    02-19    146<H>  |  111<H>  |  19<H>  ----------------------------<  168<H>  3.5   |  15<L>  |  2.50<H>    Ca    8.0<L>      19 Feb 2021 17:04  Phos  5.9     02-19  Mg     1.5     02-19    TPro  4.6<L>  /  Alb  2.3<L>  /  TBili  1.6<H>  /  DBili  x   /  AST  1825<H>  /  ALT  670<H>  /  AlkPhos  98  02-19    PT/INR - ( 19 Feb 2021 21:11 )   PT: 19.1 sec;   INR: 1.64 ratio         PTT - ( 19 Feb 2021 21:11 )  PTT:34.4 sec        RADIOLOGY & ADDITIONAL STUDIES REVIEWED:  ***    [ ]GOALS OF CARE DISCUSSION WITH PATIENT/FAMILY/PROXY:    CRITICAL CARE TIME SPENT: 35 minutes INTERVAL HPI/OVERNIGHT EVENTS:   no acute events over night   s/p wound exploration and left sided hematoma evacuation( 2L) on 2/18  s/p second wound exploration on 2/19, Hematomas of L and R breast were evacuated. No fresh bleeding identified, only oozing. The breast cavities were packed with krilex and left open  Vent: 28/450/60/5/88/95  7.06/40/89/11  Hb 8->5.1-->7.9-->6.9? 6.9? 7.7? 12.5->14  Lactate: 17.6>17>13.4  Trop: 0.415>1.26>2.12>9.6?8   CK: 5220>14K>13k   RF removed      Nephro and vascular consulted for dialysis but not proceeding at this time due to increased INR and bleeding risk.       PRESSORS: YES   WHICH: Levo       Antimicrobial:  piperacillin/tazobactam IVPB.. 3.375 Gram(s) IV Intermittent every 12 hours  vancomycin  IVPB 1000 milliGRAM(s) IV Intermittent every 24 hours  vancomycin  IVPB        Cardiovascular:  norepinephrine Infusion 1.5 MICROgram(s)/kG/Min IV Continuous <Continuous>    Pulmonary:    Hematalogic:    Other:  pantoprazole  Injectable 40 milliGRAM(s) IV Push daily  sodium bicarbonate  Infusion 0.09 mEq/kG/Hr IV Continuous <Continuous>    norepinephrine Infusion 1.5 MICROgram(s)/kG/Min IV Continuous <Continuous>  pantoprazole  Injectable 40 milliGRAM(s) IV Push daily  piperacillin/tazobactam IVPB.. 3.375 Gram(s) IV Intermittent every 12 hours  sodium bicarbonate  Infusion 0.09 mEq/kG/Hr IV Continuous <Continuous>  vancomycin  IVPB 1000 milliGRAM(s) IV Intermittent every 24 hours  vancomycin  IVPB        Drug Dosing Weight  Height (cm): 165.1 (17 Feb 2021 07:25)  Weight (kg): 82.9 (19 Feb 2021 08:00)  BMI (kg/m2): 30.4 (19 Feb 2021 08:00)  BSA (m2): 1.9 (19 Feb 2021 08:00)    CENTRAL LINE:  YES  LOCATION:  [facility]      COATS: YES          A-LINE:   NO            ICU Vital Signs Last 24 Hrs  T(C): 35.6 (19 Feb 2021 22:30), Max: 37 (19 Feb 2021 12:00)  T(F): 96 (19 Feb 2021 22:30), Max: 98.6 (19 Feb 2021 12:00)  HR: 126 (20 Feb 2021 00:01) (90 - 141)  BP: 111/69 (20 Feb 2021 00:00) (73/46 - 132/78)  BP(mean): 79 (20 Feb 2021 00:00) (53 - 92)  ABP: 106/68 (20 Feb 2021 00:00) (95/59 - 149/91)  ABP(mean): 82 (20 Feb 2021 00:00) (46 - 112)  RR: 32 (20 Feb 2021 00:00) (0 - 35)  SpO2: 94% (20 Feb 2021 00:01) (90% - 100%)      ABG - ( 19 Feb 2021 21:10 )  pH, Arterial: 7.32  pH, Blood: x     /  pCO2: 24    /  pO2: 52    / HCO3: 12    / Base Excess: -12.6 /  SaO2: 90                    02-18 @ 07:01  -  02-19 @ 07:00  --------------------------------------------------------  IN: 4776.5 mL / OUT: 883 mL / NET: 3893.5 mL        Mode: AC/ CMV (Assist Control/ Continuous Mandatory Ventilation)  RR (machine): 32  TV (machine): 500  FiO2: 80  PEEP: 5  ITime: 1  MAP: 17  PIP: 35      PHYSICAL EXAM:  GENERAL: Moderate obese F intubated and unresponsive   HEAD:  Atraumatic, Normocephalic  EYES: Dilated, not responsive to light    ENMT: ETT in place   NERVOUS SYSTEM: Unarousable despite physical stimulation, Pupils fixed and non responsive, no gag reflex   CHEST/LUNG: clear to auscultate b/l  No rales, rhonchi, wheezing, or rubs. Dressing in place with active oozing from the ALEJANDRO drains. Barehugger in place  : Coats in place with minimal urine  HEART: s1/s2 tachycardic  ABDOMEN: Soft,  Nondistended; Bowel sounds present  SKIN: bleeding from femoral CVC        LABS:  CBC Full  -  ( 19 Feb 2021 21:11 )  WBC Count : 3.94 K/uL  RBC Count : 4.86 M/uL  Hemoglobin : 14.0 g/dL  Hematocrit : 42.4 %  Platelet Count - Automated : 117 K/uL  Mean Cell Volume : 87.2 fl  Mean Cell Hemoglobin : 28.8 pg  Mean Cell Hemoglobin Concentration : 33.0 gm/dL  Auto Neutrophil # : 3.07 K/uL  Auto Lymphocyte # : 0.72 K/uL  Auto Monocyte # : 0.12 K/uL  Auto Eosinophil # : 0.00 K/uL  Auto Basophil # : 0.02 K/uL  Auto Neutrophil % : 77.9 %  Auto Lymphocyte % : 18.3 %  Auto Monocyte % : 3.0 %  Auto Eosinophil % : 0.0 %  Auto Basophil % : 0.5 %    02-19    146<H>  |  111<H>  |  19<H>  ----------------------------<  168<H>  3.5   |  15<L>  |  2.50<H>    Ca    8.0<L>      19 Feb 2021 17:04  Phos  5.9     02-19  Mg     1.5     02-19    TPro  4.6<L>  /  Alb  2.3<L>  /  TBili  1.6<H>  /  DBili  x   /  AST  1825<H>  /  ALT  670<H>  /  AlkPhos  98  02-19    PT/INR - ( 19 Feb 2021 21:11 )   PT: 19.1 sec;   INR: 1.64 ratio         PTT - ( 19 Feb 2021 21:11 )  PTT:34.4 sec        RADIOLOGY & ADDITIONAL STUDIES REVIEWED:  ***    [ ]GOALS OF CARE DISCUSSION WITH PATIENT/FAMILY/PROXY:    CRITICAL CARE TIME SPENT: 35 minutes INTERVAL HPI/OVERNIGHT EVENTS:   no acute events over night   s/p wound exploration and left sided hematoma evacuation( 2L) on 2/18  s/p second wound exploration on 2/19, Hematomas of L and R breast were evacuated. No fresh bleeding identified, only oozing. The breast cavities were packed with krilex and left open  Vent: 28/450/60/5/88/95  7.06/40/89/11  Hb 8->5.1-->7.9-->6.9? 6.9? 7.7? 12.5->14  Lactate: 17.6>17>13.4  Trop: 0.415>1.26>2.12>9.6?8   CK: 5220>14K>13k   RF removed      Nephro and vascular consulted for dialysis but not proceeding at this time due to increased INR and bleeding risk. Bicarb and lasix drip discontinued. Received 2 units of FFP in preparation for shiley placement and HD. However, HD held for increased bleeding risk.         PRESSORS: YES   WHICH: Levo       Antimicrobial:  piperacillin/tazobactam IVPB.. 3.375 Gram(s) IV Intermittent every 12 hours  vancomycin  IVPB 1000 milliGRAM(s) IV Intermittent every 24 hours  vancomycin  IVPB        Cardiovascular:  norepinephrine Infusion 1.5 MICROgram(s)/kG/Min IV Continuous <Continuous>    Pulmonary:    Hematalogic:    Other:  pantoprazole  Injectable 40 milliGRAM(s) IV Push daily  sodium bicarbonate  Infusion 0.09 mEq/kG/Hr IV Continuous <Continuous>    norepinephrine Infusion 1.5 MICROgram(s)/kG/Min IV Continuous <Continuous>  pantoprazole  Injectable 40 milliGRAM(s) IV Push daily  piperacillin/tazobactam IVPB.. 3.375 Gram(s) IV Intermittent every 12 hours  sodium bicarbonate  Infusion 0.09 mEq/kG/Hr IV Continuous <Continuous>  vancomycin  IVPB 1000 milliGRAM(s) IV Intermittent every 24 hours  vancomycin  IVPB        Drug Dosing Weight  Height (cm): 165.1 (17 Feb 2021 07:25)  Weight (kg): 82.9 (19 Feb 2021 08:00)  BMI (kg/m2): 30.4 (19 Feb 2021 08:00)  BSA (m2): 1.9 (19 Feb 2021 08:00)    CENTRAL LINE:  YES  LOCATION:  [facility]      COATS: YES          A-LINE:   NO            ICU Vital Signs Last 24 Hrs  T(C): 35.6 (19 Feb 2021 22:30), Max: 37 (19 Feb 2021 12:00)  T(F): 96 (19 Feb 2021 22:30), Max: 98.6 (19 Feb 2021 12:00)  HR: 126 (20 Feb 2021 00:01) (90 - 141)  BP: 111/69 (20 Feb 2021 00:00) (73/46 - 132/78)  BP(mean): 79 (20 Feb 2021 00:00) (53 - 92)  ABP: 106/68 (20 Feb 2021 00:00) (95/59 - 149/91)  ABP(mean): 82 (20 Feb 2021 00:00) (46 - 112)  RR: 32 (20 Feb 2021 00:00) (0 - 35)  SpO2: 94% (20 Feb 2021 00:01) (90% - 100%)      ABG - ( 19 Feb 2021 21:10 )  pH, Arterial: 7.32  pH, Blood: x     /  pCO2: 24    /  pO2: 52    / HCO3: 12    / Base Excess: -12.6 /  SaO2: 90                    02-18 @ 07:01  -  02-19 @ 07:00  --------------------------------------------------------  IN: 4776.5 mL / OUT: 883 mL / NET: 3893.5 mL        Mode: AC/ CMV (Assist Control/ Continuous Mandatory Ventilation)  RR (machine): 32  TV (machine): 500  FiO2: 80  PEEP: 5  ITime: 1  MAP: 17  PIP: 35      PHYSICAL EXAM:  GENERAL: Moderate obese F intubated and unresponsive   HEAD:  Atraumatic, Normocephalic  EYES: Dilated, not responsive to light    ENMT: ETT in place   NERVOUS SYSTEM: Unarousable despite physical stimulation, Pupils fixed and non responsive, no gag reflex   CHEST/LUNG: clear to auscultate b/l  No rales, rhonchi, wheezing, or rubs. Dressing in place with active oozing from the ALEJANDRO drains. Barehugger in place  : Coats in place with minimal urine  HEART: s1/s2 tachycardic  ABDOMEN: Soft,  Nondistended; Bowel sounds present  SKIN: bleeding from femoral CVC        LABS:  CBC Full  -  ( 19 Feb 2021 21:11 )  WBC Count : 3.94 K/uL  RBC Count : 4.86 M/uL  Hemoglobin : 14.0 g/dL  Hematocrit : 42.4 %  Platelet Count - Automated : 117 K/uL  Mean Cell Volume : 87.2 fl  Mean Cell Hemoglobin : 28.8 pg  Mean Cell Hemoglobin Concentration : 33.0 gm/dL  Auto Neutrophil # : 3.07 K/uL  Auto Lymphocyte # : 0.72 K/uL  Auto Monocyte # : 0.12 K/uL  Auto Eosinophil # : 0.00 K/uL  Auto Basophil # : 0.02 K/uL  Auto Neutrophil % : 77.9 %  Auto Lymphocyte % : 18.3 %  Auto Monocyte % : 3.0 %  Auto Eosinophil % : 0.0 %  Auto Basophil % : 0.5 %    02-19    146<H>  |  111<H>  |  19<H>  ----------------------------<  168<H>  3.5   |  15<L>  |  2.50<H>    Ca    8.0<L>      19 Feb 2021 17:04  Phos  5.9     02-19  Mg     1.5     02-19    TPro  4.6<L>  /  Alb  2.3<L>  /  TBili  1.6<H>  /  DBili  x   /  AST  1825<H>  /  ALT  670<H>  /  AlkPhos  98  02-19    PT/INR - ( 19 Feb 2021 21:11 )   PT: 19.1 sec;   INR: 1.64 ratio         PTT - ( 19 Feb 2021 21:11 )  PTT:34.4 sec        RADIOLOGY & ADDITIONAL STUDIES REVIEWED:  ***    [ ]GOALS OF CARE DISCUSSION WITH PATIENT/FAMILY/PROXY:    CRITICAL CARE TIME SPENT: 35 minutes INTERVAL HPI/OVERNIGHT EVENTS:   no acute events over night   s/p wound exploration and left sided hematoma evacuation( 2L) on 2/18  s/p second wound exploration on 2/19, Hematomas of L and R breast were evacuated. No fresh bleeding identified, only oozing. The breast cavities were packed with krilex and left open  Vent: 28/450/60/5/88/95  7.06/40/89/11  Hb 8->5.1-->7.9-->6.9? 6.9? 7.7? 12.5->14  Lactate: 17.6>17>13.4  Trop: 0.415>1.26>2.12>9.6?8   CK: 5220>14K>13k   RF removed      Nephro and vascular consulted for dialysis but not proceeding at this time due to increased INR and bleeding risk. Bicarb and lasix drip discontinued. Received 2 units of FFP in preparation for shiley placement and HD. However, HD held for increased bleeding risk.         PRESSORS: YES   WHICH: Levo       Antimicrobial:  piperacillin/tazobactam IVPB.. 3.375 Gram(s) IV Intermittent every 12 hours  vancomycin  IVPB 1000 milliGRAM(s) IV Intermittent every 24 hours  vancomycin  IVPB        Cardiovascular:  norepinephrine Infusion 1.5 MICROgram(s)/kG/Min IV Continuous <Continuous>    Pulmonary:    Hematalogic:    Other:  pantoprazole  Injectable 40 milliGRAM(s) IV Push daily  sodium bicarbonate  Infusion 0.09 mEq/kG/Hr IV Continuous <Continuous>    norepinephrine Infusion 1.5 MICROgram(s)/kG/Min IV Continuous <Continuous>  pantoprazole  Injectable 40 milliGRAM(s) IV Push daily  piperacillin/tazobactam IVPB.. 3.375 Gram(s) IV Intermittent every 12 hours  sodium bicarbonate  Infusion 0.09 mEq/kG/Hr IV Continuous <Continuous>  vancomycin  IVPB 1000 milliGRAM(s) IV Intermittent every 24 hours  vancomycin  IVPB        Drug Dosing Weight  Height (cm): 165.1 (17 Feb 2021 07:25)  Weight (kg): 82.9 (19 Feb 2021 08:00)  BMI (kg/m2): 30.4 (19 Feb 2021 08:00)  BSA (m2): 1.9 (19 Feb 2021 08:00)    CENTRAL LINE:  YES  LOCATION:  [facility]      COATS: YES          A-LINE:   NO            ICU Vital Signs Last 24 Hrs  T(C): 35.6 (19 Feb 2021 22:30), Max: 37 (19 Feb 2021 12:00)  T(F): 96 (19 Feb 2021 22:30), Max: 98.6 (19 Feb 2021 12:00)  HR: 126 (20 Feb 2021 00:01) (90 - 141)  BP: 111/69 (20 Feb 2021 00:00) (73/46 - 132/78)  BP(mean): 79 (20 Feb 2021 00:00) (53 - 92)  ABP: 106/68 (20 Feb 2021 00:00) (95/59 - 149/91)  ABP(mean): 82 (20 Feb 2021 00:00) (46 - 112)  RR: 32 (20 Feb 2021 00:00) (0 - 35)  SpO2: 94% (20 Feb 2021 00:01) (90% - 100%)      ABG - ( 19 Feb 2021 21:10 )  pH, Arterial: 7.32  pH, Blood: x     /  pCO2: 24    /  pO2: 52    / HCO3: 12    / Base Excess: -12.6 /  SaO2: 90                    02-18 @ 07:01  -  02-19 @ 07:00  --------------------------------------------------------  IN: 4776.5 mL / OUT: 883 mL / NET: 3893.5 mL        Mode: AC/ CMV (Assist Control/ Continuous Mandatory Ventilation)  RR (machine): 32  TV (machine): 500  FiO2: 80  PEEP: 5  ITime: 1  MAP: 17  PIP: 35      PHYSICAL EXAM:  GENERAL: Moderate obese F intubated and unresponsive    HEAD:  Atraumatic, Normocephalic  EYES: Dilated, not responsive to light    ENMT: ETT in place   NERVOUS SYSTEM: Unarousable despite physical stimulation, Pupils fixed and non responsive, no gag reflex , no respond to painful stimuli   CHEST/LUNG: clear to auscultate b/l  No rales, rhonchi, wheezing, or rubs.   Breast: B/L Dressing in place with no active oozing +  ALEJANDRO drains with serosanguinous fluid  . Barehugger in place  : Coats in place with minimal urine  HEART: s1/s2 tachycardic  ABDOMEN: Soft,  Nondistended; Bowel sounds present  SKIN: bleeding from femoral CVC        LABS:  CBC Full  -  ( 19 Feb 2021 21:11 )  WBC Count : 3.94 K/uL  RBC Count : 4.86 M/uL  Hemoglobin : 14.0 g/dL  Hematocrit : 42.4 %  Platelet Count - Automated : 117 K/uL  Mean Cell Volume : 87.2 fl  Mean Cell Hemoglobin : 28.8 pg  Mean Cell Hemoglobin Concentration : 33.0 gm/dL  Auto Neutrophil # : 3.07 K/uL  Auto Lymphocyte # : 0.72 K/uL  Auto Monocyte # : 0.12 K/uL  Auto Eosinophil # : 0.00 K/uL  Auto Basophil # : 0.02 K/uL  Auto Neutrophil % : 77.9 %  Auto Lymphocyte % : 18.3 %  Auto Monocyte % : 3.0 %  Auto Eosinophil % : 0.0 %  Auto Basophil % : 0.5 %    02-19    146<H>  |  111<H>  |  19<H>  ----------------------------<  168<H>  3.5   |  15<L>  |  2.50<H>    Ca    8.0<L>      19 Feb 2021 17:04  Phos  5.9     02-19  Mg     1.5     02-19    TPro  4.6<L>  /  Alb  2.3<L>  /  TBili  1.6<H>  /  DBili  x   /  AST  1825<H>  /  ALT  670<H>  /  AlkPhos  98  02-19    PT/INR - ( 19 Feb 2021 21:11 )   PT: 19.1 sec;   INR: 1.64 ratio         PTT - ( 19 Feb 2021 21:11 )  PTT:34.4 sec        RADIOLOGY & ADDITIONAL STUDIES REVIEWED:  ***  CXR:< from: Xray Chest 1 View- PORTABLE-Routine (Xray Chest 1 View- PORTABLE-Routine in AM.) (02.20.21 @ 09:35) >    EXAM:  XR CHEST PORTABLE ROUTINE 1V                            PROCEDURE DATE:  02/20/2021          INTERPRETATION:  AP semierect chest on February 20, 2021 at 8:32 AM. Patient had a cardiac arrest was intubated.    Heart size is within normal limits.    Endotracheal tube, right jugular line, left-sided [id number], and persistent mid lateral lung infiltrates remain.    Presently infiltrates have increased from February 19.    The present film also shows a nasogastric tube inserted a left chest tube is present on both studies.    IMPRESSION: Increasing infiltrates with pleural component particularly on the right.              SUPRIYA STEELE M.D., ATTENDING RADIOLOGIST  This document has been electronically signed. Feb 20 2021 12:57PM    < end of copied text >      [ ]GOALS OF CARE DISCUSSION WITH PATIENT/FAMILY/PROXY:    CRITICAL CARE TIME SPENT: 35 minutes

## 2021-02-20 NOTE — PROGRESS NOTE ADULT - ASSESSMENT
45 F s/p b/l mastectomy, POD 4, with post operative course complicated by cardiac arrest, intubation, poor neurological response, DIC, and b/l breast hematomas, POD1 from b/l breast washout and packing placement. Patient's prognosis remains guarded and she remains in critical condition:   - Surgical dressings to remain in place for at least 48 hours  - ALEJANDRO drains to medium continuous wall suction   - Continue resuscitation by ICU team, wean pressors as tolerated   - Trend labs  - Follow up nephrology recs  - Rest of care as per ICU team

## 2021-02-20 NOTE — CHART NOTE - NSCHARTNOTEFT_GEN_A_CORE
At this time, given no urgent need for hemodialysis, will hold off for now after discussion between surgery team and nephrology. Patient's  Sebastian updated.

## 2021-02-20 NOTE — EEG REPORT - NS EEG TEXT BOX
[facility] Report of Routine EEG with Video And Report of DigitalCompressed Spectral Array Analysis  [facility]: [address] [facility] [address], [address] [place] [address], Phone: [phone] [facility]: [address], [place] [address], Phone: [phone] Office: [address] [place] [address], [place] [address], [place] [address], Phone: [phone]  Patient Name: MARIA LUISA TANG Age: 45 year : 1975 MRN #: -, Location: ICU BED 1 W1 Referring Physician: DR CUELLAR  EEG #: [id number] Study Date: 2021   Start Time: 9:18:16 PM    Study Duration: 30.6 		  Technical Information:					 On Instrument: - Placement and Labeling of Electrodes: The EEG was performed utilizing 20 channels referential EEG connections (coronal over temporal over parasagittal montage) using all standard 10-20 electrode placements with EKG.  Recording was at a sampling rate of 256 samples per second per channel.  Time synchronized digital video recording was done simultaneously with EEG recording.  A low light infrared camera was used for low light recording.  Martín and seizure detection algorithms were utilized. CSA Technical Component: Quantitative EEG analysis using a separate Compressed Spectral Array (CSA) software package was conducted in real-time and run at bedside after set up by the technician, digitally displaying the power of electrographic frequencies included in the 1-30Hz band using a graded color map.  This data was reviewed and interpreted independently, and is reported in a separate section below.  History: ROUTINE EEG PERFORMED AT BEDSIDE 46 Y/O FEMALE COR : COMATOSE H/O : HLD, MASS OF RIGHT LUNG, HTN, BREAST CANCER, BILATERAL TOTAL MASTECTOMY P/W : CARDIAC ARREST PATIENT IS INTUBATED NO HYPERVENTILATION PERFORMED DUE TO CONDITION PHOTIC STIMULATION COMPLETED EKG ARTIFACTS , PATIENT HAS DOUBLE MASTECTOMY THE WHOLE CHEST IS COVERED WITH DRESSING UPPER AND LOWER EXTREMITIES STIMULATION PERFORMED -115   Medication No Data.  Study Interpretation:  FINDINGS:  The background was diffuse voltage suppressed and invariant to noxious stimulation.  Background Slowing: Generalized slowing: none was present. Focal slowing: none was present.  Epileptiform Activity:  No epileptiform discharges were present.  Events: No clinical events were recorded. No seizures were recorded.  Activation Procedures:  -Hyperventilation was not performed.   -Photic stimulation was performed and did not elicit any change.    Artifacts: Intermittent myogenic and movement artifacts were noted.  ECG: The heart rate on single channel ECG was predominantly between 110-115 BPM.  EEG Classification / Summary: Abnormal Routine EEG Study  Voltage suppression invariant Clinical Impression: There is severe generalized diffuse cerebral dysfunction consistent with h/o diffuse anoxic injury. There were no epileptiform abnormalities recorded.    ________________________________________    Zachary Ball MD PhD Director, Epilepsy Division, [facility]  		   [facility] Report of Routine EEG with Video And Report of DigitalCompressed Spectral Array Analysis  [facility]: [address] [facility], [address] [place] [address], Phone: [phone] [facility]: [address], [place] [address], Phone: [phone] Office: [address] [place] [address], [place] [address], [place] [address], Phone: [phone]  Patient Name: MARIA LUISA TANG Age: 45 year : 1975 MRN #: -, Location: ICU BED 1 W1 Referring Physician: DR CUELLAR  EEG #: [id number] Study Date: 2021   Start Time: 9:18:16 PM    Study Duration: 30.6 		  Technical Information:					 On Instrument: - Placement and Labeling of Electrodes: The EEG was performed utilizing 20 channels referential EEG connections (coronal over temporal over parasagittal montage) using all standard 10-20 electrode placements with EKG.  Recording was at a sampling rate of 256 samples per second per channel.  Time synchronized digital video recording was done simultaneously with EEG recording.  A low light infrared camera was used for low light recording.  Martín and seizure detection algorithms were utilized. CSA Technical Component: Quantitative EEG analysis using a separate Compressed Spectral Array (CSA) software package was conducted in real-time and run at bedside after set up by the technician, digitally displaying the power of electrographic frequencies included in the 1-30Hz band using a graded color map.  This data was reviewed and interpreted independently, and is reported in a separate section below.  History: ROUTINE EEG PERFORMED AT BEDSIDE 46 Y/O FEMALE COR : COMATOSE H/O : HLD, MASS OF RIGHT LUNG, HTN, BREAST CANCER, BILATERAL TOTAL MASTECTOMY P/W : CARDIAC ARREST PATIENT IS INTUBATED NO HYPERVENTILATION PERFORMED DUE TO CONDITION PHOTIC STIMULATION COMPLETED EKG ARTIFACTS , PATIENT HAS DOUBLE MASTECTOMY THE WHOLE CHEST IS COVERED WITH DRESSING UPPER AND LOWER EXTREMITIES STIMULATION PERFORMED -115   Medication No Data.  Study Interpretation:  FINDINGS:  The background was diffuse voltage suppressed and invariant to noxious stimulation.  Background Slowing: Generalized slowing: none was present. Focal slowing: none was present.  Epileptiform Activity:  No epileptiform discharges were present.  Events: No clinical events were recorded. No seizures were recorded.  Activation Procedures:  -Hyperventilation was not performed.   -Photic stimulation was performed and did not elicit any change.    Artifacts: Intermittent myogenic and movement artifacts were noted.  ECG: The heart rate on single channel ECG was predominantly between 110-115 BPM.  EEG Classification / Summary: Abnormal Routine EEG Study  Voltage suppression invariant Clinical Impression: There is severe generalized diffuse cerebral dysfunction that is nonspecific for etiology but consistent with h/o diffuse anoxic injury. There may be some contribution of sedative medication.  There were no epileptiform abnormalities recorded.    ________________________________________    Zachary Ball MD PhD Director, Epilepsy Division, [facility]

## 2021-02-20 NOTE — PROGRESS NOTE ADULT - SUBJECTIVE AND OBJECTIVE BOX
Spoke with nephrology attending Dr. Rosas about the need for dialysis with an INR of 2.2. Nephrology does not want to proceed with shiley placement at this time due to bleeding risk. Will continue to monitor coagulation profile and reevaluate the possibility of dialysis tomorrow if able to get and keep INR down.

## 2021-02-21 LAB
ALBUMIN SERPL ELPH-MCNC: 1.2 G/DL — LOW (ref 3.5–5)
ALBUMIN SERPL ELPH-MCNC: 1.4 G/DL — LOW (ref 3.5–5)
ALBUMIN SERPL ELPH-MCNC: 1.4 G/DL — LOW (ref 3.5–5)
ALBUMIN SERPL ELPH-MCNC: 1.7 G/DL — LOW (ref 3.5–5)
ALP SERPL-CCNC: 203 U/L — HIGH (ref 40–120)
ALP SERPL-CCNC: 213 U/L — HIGH (ref 40–120)
ALP SERPL-CCNC: 230 U/L — HIGH (ref 40–120)
ALP SERPL-CCNC: 233 U/L — HIGH (ref 40–120)
ALP SERPL-CCNC: 236 U/L — HIGH (ref 40–120)
ALT FLD-CCNC: 1122 U/L DA — HIGH (ref 10–60)
ALT FLD-CCNC: 1446 U/L DA — HIGH (ref 10–60)
ALT FLD-CCNC: 1780 U/L DA — HIGH (ref 10–60)
ALT FLD-CCNC: 2101 U/L DA — HIGH (ref 10–60)
ALT FLD-CCNC: 976 U/L DA — HIGH (ref 10–60)
ANION GAP SERPL CALC-SCNC: 28 MMOL/L — HIGH (ref 5–17)
ANION GAP SERPL CALC-SCNC: 29 MMOL/L — HIGH (ref 5–17)
ANION GAP SERPL CALC-SCNC: 30 MMOL/L — HIGH (ref 5–17)
ANION GAP SERPL CALC-SCNC: 31 MMOL/L — HIGH (ref 5–17)
ANISOCYTOSIS BLD QL: SLIGHT — SIGNIFICANT CHANGE UP
APTT BLD: 34.1 SEC — SIGNIFICANT CHANGE UP (ref 27.5–35.5)
APTT BLD: 36.8 SEC — HIGH (ref 27.5–35.5)
APTT BLD: 40.5 SEC — HIGH (ref 27.5–35.5)
APTT BLD: 43.6 SEC — HIGH (ref 27.5–35.5)
APTT BLD: 43.7 SEC — HIGH (ref 27.5–35.5)
AST SERPL-CCNC: 2432 U/L — HIGH (ref 10–40)
AST SERPL-CCNC: 2671 U/L — HIGH (ref 10–40)
AST SERPL-CCNC: 2907 U/L — HIGH (ref 10–40)
AST SERPL-CCNC: 3699 U/L — HIGH (ref 10–40)
AST SERPL-CCNC: 4523 U/L — HIGH (ref 10–40)
BASE EXCESS BLDA CALC-SCNC: -18.9 MMOL/L — LOW (ref -2–2)
BASE EXCESS BLDA CALC-SCNC: -20.2 MMOL/L — LOW (ref -2–2)
BASE EXCESS BLDA CALC-SCNC: -22.5 MMOL/L — LOW (ref -2–2)
BASOPHILS # BLD AUTO: 0 K/UL — SIGNIFICANT CHANGE UP (ref 0–0.2)
BASOPHILS # BLD AUTO: 0.01 K/UL — SIGNIFICANT CHANGE UP (ref 0–0.2)
BASOPHILS # BLD AUTO: 0.02 K/UL — SIGNIFICANT CHANGE UP (ref 0–0.2)
BASOPHILS # BLD AUTO: SIGNIFICANT CHANGE UP K/UL (ref 0–0.2)
BASOPHILS NFR BLD AUTO: 0 % — SIGNIFICANT CHANGE UP (ref 0–2)
BASOPHILS NFR BLD AUTO: 0.1 % — SIGNIFICANT CHANGE UP (ref 0–2)
BASOPHILS NFR BLD AUTO: 0.1 % — SIGNIFICANT CHANGE UP (ref 0–2)
BASOPHILS NFR BLD AUTO: SIGNIFICANT CHANGE UP % (ref 0–2)
BILIRUB SERPL-MCNC: 4.5 MG/DL — HIGH (ref 0.2–1.2)
BILIRUB SERPL-MCNC: 4.6 MG/DL — HIGH (ref 0.2–1.2)
BILIRUB SERPL-MCNC: 4.8 MG/DL — HIGH (ref 0.2–1.2)
BILIRUB SERPL-MCNC: 5.2 MG/DL — HIGH (ref 0.2–1.2)
BILIRUB SERPL-MCNC: 5.4 MG/DL — HIGH (ref 0.2–1.2)
BLOOD GAS COMMENTS ARTERIAL: SIGNIFICANT CHANGE UP
BUN SERPL-MCNC: 18 MG/DL — SIGNIFICANT CHANGE UP (ref 7–18)
BUN SERPL-MCNC: 20 MG/DL — HIGH (ref 7–18)
BUN SERPL-MCNC: 21 MG/DL — HIGH (ref 7–18)
BUN SERPL-MCNC: 24 MG/DL — HIGH (ref 7–18)
BURR CELLS BLD QL SMEAR: PRESENT — SIGNIFICANT CHANGE UP
CALCIUM SERPL-MCNC: 5.7 MG/DL — CRITICAL LOW (ref 8.4–10.5)
CALCIUM SERPL-MCNC: 5.8 MG/DL — CRITICAL LOW (ref 8.4–10.5)
CALCIUM SERPL-MCNC: 6.2 MG/DL — CRITICAL LOW (ref 8.4–10.5)
CALCIUM SERPL-MCNC: 6.8 MG/DL — LOW (ref 8.4–10.5)
CHLORIDE SERPL-SCNC: 102 MMOL/L — SIGNIFICANT CHANGE UP (ref 96–108)
CHLORIDE SERPL-SCNC: 106 MMOL/L — SIGNIFICANT CHANGE UP (ref 96–108)
CHLORIDE SERPL-SCNC: 108 MMOL/L — SIGNIFICANT CHANGE UP (ref 96–108)
CHLORIDE SERPL-SCNC: 110 MMOL/L — HIGH (ref 96–108)
CO2 SERPL-SCNC: 11 MMOL/L — LOW (ref 22–31)
CO2 SERPL-SCNC: 7 MMOL/L — CRITICAL LOW (ref 22–31)
CO2 SERPL-SCNC: 8 MMOL/L — CRITICAL LOW (ref 22–31)
CO2 SERPL-SCNC: 9 MMOL/L — CRITICAL LOW (ref 22–31)
CREAT SERPL-MCNC: 3.87 MG/DL — HIGH (ref 0.5–1.3)
CREAT SERPL-MCNC: 3.88 MG/DL — HIGH (ref 0.5–1.3)
CREAT SERPL-MCNC: 3.98 MG/DL — HIGH (ref 0.5–1.3)
CREAT SERPL-MCNC: 4.11 MG/DL — HIGH (ref 0.5–1.3)
CREAT SERPL-MCNC: 4.18 MG/DL — HIGH (ref 0.5–1.3)
D DIMER BLD IA.RAPID-MCNC: 4497 NG/ML DDU — HIGH
D DIMER BLD IA.RAPID-MCNC: 4724 NG/ML DDU — HIGH
D DIMER BLD IA.RAPID-MCNC: 4881 NG/ML DDU — HIGH
D DIMER BLD IA.RAPID-MCNC: 5673 NG/ML DDU — HIGH
D DIMER BLD IA.RAPID-MCNC: 5693 NG/ML DDU — HIGH
EOSINOPHIL # BLD AUTO: 0.12 K/UL — SIGNIFICANT CHANGE UP (ref 0–0.5)
EOSINOPHIL # BLD AUTO: 0.25 K/UL — SIGNIFICANT CHANGE UP (ref 0–0.5)
EOSINOPHIL # BLD AUTO: 0.44 K/UL — SIGNIFICANT CHANGE UP (ref 0–0.5)
EOSINOPHIL # BLD AUTO: SIGNIFICANT CHANGE UP K/UL (ref 0–0.5)
EOSINOPHIL NFR BLD AUTO: 0.6 % — SIGNIFICANT CHANGE UP (ref 0–6)
EOSINOPHIL NFR BLD AUTO: 1.4 % — SIGNIFICANT CHANGE UP (ref 0–6)
EOSINOPHIL NFR BLD AUTO: 2 % — SIGNIFICANT CHANGE UP (ref 0–6)
EOSINOPHIL NFR BLD AUTO: SIGNIFICANT CHANGE UP % (ref 0–6)
FIBRINOGEN PPP-MCNC: 239 MG/DL — LOW (ref 290–520)
FIBRINOGEN PPP-MCNC: 252 MG/DL — LOW (ref 290–520)
FIBRINOGEN PPP-MCNC: 267 MG/DL — LOW (ref 290–520)
FIBRINOGEN PPP-MCNC: 280 MG/DL — LOW (ref 290–520)
FIBRINOGEN PPP-MCNC: 280 MG/DL — LOW (ref 290–520)
FSP PPP-MCNC: >=20 — SIGNIFICANT CHANGE UP
GLUCOSE BLDC GLUCOMTR-MCNC: 122 MG/DL — HIGH (ref 70–99)
GLUCOSE BLDC GLUCOMTR-MCNC: 124 MG/DL — HIGH (ref 70–99)
GLUCOSE BLDC GLUCOMTR-MCNC: 126 MG/DL — HIGH (ref 70–99)
GLUCOSE BLDC GLUCOMTR-MCNC: 132 MG/DL — HIGH (ref 70–99)
GLUCOSE BLDC GLUCOMTR-MCNC: 78 MG/DL — SIGNIFICANT CHANGE UP (ref 70–99)
GLUCOSE BLDC GLUCOMTR-MCNC: 85 MG/DL — SIGNIFICANT CHANGE UP (ref 70–99)
GLUCOSE SERPL-MCNC: 130 MG/DL — HIGH (ref 70–99)
GLUCOSE SERPL-MCNC: 192 MG/DL — HIGH (ref 70–99)
GLUCOSE SERPL-MCNC: 289 MG/DL — HIGH (ref 70–99)
GLUCOSE SERPL-MCNC: 83 MG/DL — SIGNIFICANT CHANGE UP (ref 70–99)
HCO3 BLDA-SCNC: 6 MMOL/L — LOW (ref 23–27)
HCO3 BLDA-SCNC: 7 MMOL/L — LOW (ref 23–27)
HCO3 BLDA-SCNC: 8 MMOL/L — LOW (ref 23–27)
HCT VFR BLD CALC: 30 % — LOW (ref 34.5–45)
HCT VFR BLD CALC: 30.7 % — LOW (ref 34.5–45)
HCT VFR BLD CALC: 32 % — LOW (ref 34.5–45)
HCT VFR BLD CALC: 34.1 % — LOW (ref 34.5–45)
HGB BLD-MCNC: 10 G/DL — LOW (ref 11.5–15.5)
HGB BLD-MCNC: 10.2 G/DL — LOW (ref 11.5–15.5)
HGB BLD-MCNC: 10.6 G/DL — LOW (ref 11.5–15.5)
HGB BLD-MCNC: 11.8 G/DL — SIGNIFICANT CHANGE UP (ref 11.5–15.5)
HOROWITZ INDEX BLDA+IHG-RTO: 100 — SIGNIFICANT CHANGE UP
HOROWITZ INDEX BLDA+IHG-RTO: 100 — SIGNIFICANT CHANGE UP
HOROWITZ INDEX BLDA+IHG-RTO: 75 — SIGNIFICANT CHANGE UP
IMM GRANULOCYTES NFR BLD AUTO: 1.1 % — SIGNIFICANT CHANGE UP (ref 0–1.5)
IMM GRANULOCYTES NFR BLD AUTO: 1.9 % — HIGH (ref 0–1.5)
IMM GRANULOCYTES NFR BLD AUTO: SIGNIFICANT CHANGE UP % (ref 0–1.5)
INR BLD: 2.16 RATIO — HIGH (ref 0.88–1.16)
INR BLD: 2.33 RATIO — HIGH (ref 0.88–1.16)
INR BLD: 2.77 RATIO — HIGH (ref 0.88–1.16)
INR BLD: 2.82 RATIO — HIGH (ref 0.88–1.16)
INR BLD: 2.89 RATIO — HIGH (ref 0.88–1.16)
LACTATE SERPL-SCNC: 17.2 MMOL/L — CRITICAL HIGH (ref 0.7–2)
LACTATE SERPL-SCNC: 18.2 MMOL/L — CRITICAL HIGH (ref 0.7–2)
LACTATE SERPL-SCNC: 18.7 MMOL/L — CRITICAL HIGH (ref 0.7–2)
LACTATE SERPL-SCNC: 19.7 MMOL/L — CRITICAL HIGH (ref 0.7–2)
LACTATE SERPL-SCNC: 20.5 MMOL/L — CRITICAL HIGH (ref 0.7–2)
LYMPHOCYTES # BLD AUTO: 1.06 K/UL — SIGNIFICANT CHANGE UP (ref 1–3.3)
LYMPHOCYTES # BLD AUTO: 1.25 K/UL — SIGNIFICANT CHANGE UP (ref 1–3.3)
LYMPHOCYTES # BLD AUTO: 2 K/UL — SIGNIFICANT CHANGE UP (ref 1–3.3)
LYMPHOCYTES # BLD AUTO: 6 % — LOW (ref 13–44)
LYMPHOCYTES # BLD AUTO: 6.4 % — LOW (ref 13–44)
LYMPHOCYTES # BLD AUTO: 9 % — LOW (ref 13–44)
LYMPHOCYTES # BLD AUTO: SIGNIFICANT CHANGE UP % (ref 13–44)
LYMPHOCYTES # BLD AUTO: SIGNIFICANT CHANGE UP K/UL (ref 1–3.3)
MAGNESIUM SERPL-MCNC: 1.3 MG/DL — LOW (ref 1.6–2.6)
MAGNESIUM SERPL-MCNC: 1.4 MG/DL — LOW (ref 1.6–2.6)
MANUAL SMEAR VERIFICATION: SIGNIFICANT CHANGE UP
MCHC RBC-ENTMCNC: 29 PG — SIGNIFICANT CHANGE UP (ref 27–34)
MCHC RBC-ENTMCNC: 29.1 PG — SIGNIFICANT CHANGE UP (ref 27–34)
MCHC RBC-ENTMCNC: 29.6 PG — SIGNIFICANT CHANGE UP (ref 27–34)
MCHC RBC-ENTMCNC: 30 PG — SIGNIFICANT CHANGE UP (ref 27–34)
MCHC RBC-ENTMCNC: 33.1 GM/DL — SIGNIFICANT CHANGE UP (ref 32–36)
MCHC RBC-ENTMCNC: 33.2 GM/DL — SIGNIFICANT CHANGE UP (ref 32–36)
MCHC RBC-ENTMCNC: 33.3 GM/DL — SIGNIFICANT CHANGE UP (ref 32–36)
MCHC RBC-ENTMCNC: 34.6 GM/DL — SIGNIFICANT CHANGE UP (ref 32–36)
MCV RBC AUTO: 86.8 FL — SIGNIFICANT CHANGE UP (ref 80–100)
MCV RBC AUTO: 87.5 FL — SIGNIFICANT CHANGE UP (ref 80–100)
MCV RBC AUTO: 87.7 FL — SIGNIFICANT CHANGE UP (ref 80–100)
MCV RBC AUTO: 88.8 FL — SIGNIFICANT CHANGE UP (ref 80–100)
MONOCYTES # BLD AUTO: 0.45 K/UL — SIGNIFICANT CHANGE UP (ref 0–0.9)
MONOCYTES # BLD AUTO: 0.49 K/UL — SIGNIFICANT CHANGE UP (ref 0–0.9)
MONOCYTES # BLD AUTO: 1.11 K/UL — HIGH (ref 0–0.9)
MONOCYTES # BLD AUTO: SIGNIFICANT CHANGE UP K/UL (ref 0–0.9)
MONOCYTES NFR BLD AUTO: 2.5 % — SIGNIFICANT CHANGE UP (ref 2–14)
MONOCYTES NFR BLD AUTO: 2.5 % — SIGNIFICANT CHANGE UP (ref 2–14)
MONOCYTES NFR BLD AUTO: 5 % — SIGNIFICANT CHANGE UP (ref 2–14)
MONOCYTES NFR BLD AUTO: SIGNIFICANT CHANGE UP % (ref 2–14)
MYELOCYTES NFR BLD: 18 % — HIGH (ref 0–0)
NEUTROPHILS # BLD AUTO: 14.63 K/UL — HIGH (ref 1.8–7.4)
NEUTROPHILS # BLD AUTO: 15.61 K/UL — HIGH (ref 1.8–7.4)
NEUTROPHILS # BLD AUTO: 17.56 K/UL — HIGH (ref 1.8–7.4)
NEUTROPHILS # BLD AUTO: SIGNIFICANT CHANGE UP K/UL (ref 1.8–7.4)
NEUTROPHILS NFR BLD AUTO: 66 % — SIGNIFICANT CHANGE UP (ref 43–77)
NEUTROPHILS NFR BLD AUTO: 88.1 % — HIGH (ref 43–77)
NEUTROPHILS NFR BLD AUTO: 89.3 % — HIGH (ref 43–77)
NEUTROPHILS NFR BLD AUTO: SIGNIFICANT CHANGE UP % (ref 43–77)
NRBC # BLD: 0 /100 WBCS — SIGNIFICANT CHANGE UP (ref 0–0)
NRBC # BLD: 0 /100 — SIGNIFICANT CHANGE UP (ref 0–0)
PCO2 BLDA: 20 MMHG — LOW (ref 32–46)
PCO2 BLDA: 22 MMHG — LOW (ref 32–46)
PCO2 BLDA: 22 MMHG — LOW (ref 32–46)
PH BLDA: 7.08 — CRITICAL LOW (ref 7.35–7.45)
PH BLDA: 7.14 — CRITICAL LOW (ref 7.35–7.45)
PH BLDA: 7.21 — LOW (ref 7.35–7.45)
PHOSPHATE SERPL-MCNC: 4.2 MG/DL — SIGNIFICANT CHANGE UP (ref 2.5–4.5)
PHOSPHATE SERPL-MCNC: 4.7 MG/DL — HIGH (ref 2.5–4.5)
PHOSPHATE SERPL-MCNC: 5.9 MG/DL — HIGH (ref 2.5–4.5)
PLAT MORPH BLD: NORMAL — SIGNIFICANT CHANGE UP
PLATELET # BLD AUTO: 36 K/UL — LOW (ref 150–400)
PLATELET # BLD AUTO: 41 K/UL — LOW (ref 150–400)
PLATELET # BLD AUTO: 51 K/UL — LOW (ref 150–400)
PLATELET # BLD AUTO: 62 K/UL — LOW (ref 150–400)
PO2 BLDA: 150 MMHG — HIGH (ref 74–108)
PO2 BLDA: 188 MMHG — HIGH (ref 74–108)
PO2 BLDA: 97 MMHG — SIGNIFICANT CHANGE UP (ref 74–108)
POIKILOCYTOSIS BLD QL AUTO: SLIGHT — SIGNIFICANT CHANGE UP
POTASSIUM SERPL-MCNC: 3.5 MMOL/L — SIGNIFICANT CHANGE UP (ref 3.5–5.3)
POTASSIUM SERPL-MCNC: 3.7 MMOL/L — SIGNIFICANT CHANGE UP (ref 3.5–5.3)
POTASSIUM SERPL-MCNC: 3.7 MMOL/L — SIGNIFICANT CHANGE UP (ref 3.5–5.3)
POTASSIUM SERPL-MCNC: 4 MMOL/L — SIGNIFICANT CHANGE UP (ref 3.5–5.3)
POTASSIUM SERPL-SCNC: 3.5 MMOL/L — SIGNIFICANT CHANGE UP (ref 3.5–5.3)
POTASSIUM SERPL-SCNC: 3.7 MMOL/L — SIGNIFICANT CHANGE UP (ref 3.5–5.3)
POTASSIUM SERPL-SCNC: 3.7 MMOL/L — SIGNIFICANT CHANGE UP (ref 3.5–5.3)
POTASSIUM SERPL-SCNC: 4 MMOL/L — SIGNIFICANT CHANGE UP (ref 3.5–5.3)
PROT SERPL-MCNC: 2.8 G/DL — LOW (ref 6–8.3)
PROT SERPL-MCNC: 3 G/DL — LOW (ref 6–8.3)
PROT SERPL-MCNC: 3.1 G/DL — LOW (ref 6–8.3)
PROT SERPL-MCNC: 3.7 G/DL — LOW (ref 6–8.3)
PROT SERPL-MCNC: 3.8 G/DL — LOW (ref 6–8.3)
PROTHROM AB SERPL-ACNC: 24.8 SEC — HIGH (ref 10.6–13.6)
PROTHROM AB SERPL-ACNC: 26.6 SEC — HIGH (ref 10.6–13.6)
PROTHROM AB SERPL-ACNC: 31.4 SEC — HIGH (ref 10.6–13.6)
PROTHROM AB SERPL-ACNC: 31.9 SEC — HIGH (ref 10.6–13.6)
PROTHROM AB SERPL-ACNC: 32.7 SEC — HIGH (ref 10.6–13.6)
RBC # BLD: 3.38 M/UL — LOW (ref 3.8–5.2)
RBC # BLD: 3.51 M/UL — LOW (ref 3.8–5.2)
RBC # BLD: 3.65 M/UL — LOW (ref 3.8–5.2)
RBC # BLD: 3.93 M/UL — SIGNIFICANT CHANGE UP (ref 3.8–5.2)
RBC # FLD: 17.2 % — HIGH (ref 10.3–14.5)
RBC # FLD: 17.4 % — HIGH (ref 10.3–14.5)
RBC # FLD: 17.7 % — HIGH (ref 10.3–14.5)
RBC # FLD: 18 % — HIGH (ref 10.3–14.5)
RBC BLD AUTO: ABNORMAL
SAO2 % BLDA: 97 % — HIGH (ref 92–96)
SAO2 % BLDA: 98 % — HIGH (ref 92–96)
SAO2 % BLDA: 99 % — HIGH (ref 92–96)
SODIUM SERPL-SCNC: 142 MMOL/L — SIGNIFICANT CHANGE UP (ref 135–145)
SODIUM SERPL-SCNC: 145 MMOL/L — SIGNIFICANT CHANGE UP (ref 135–145)
SODIUM SERPL-SCNC: 145 MMOL/L — SIGNIFICANT CHANGE UP (ref 135–145)
SODIUM SERPL-SCNC: 147 MMOL/L — HIGH (ref 135–145)
VANCOMYCIN TROUGH SERPL-MCNC: 18.8 UG/ML — SIGNIFICANT CHANGE UP (ref 10–20)
WBC # BLD: 17.71 K/UL — HIGH (ref 3.8–10.5)
WBC # BLD: 19.66 K/UL — HIGH (ref 3.8–10.5)
WBC # BLD: 22.17 K/UL — HIGH (ref 3.8–10.5)
WBC # BLD: 23.66 K/UL — HIGH (ref 3.8–10.5)
WBC # FLD AUTO: 17.71 K/UL — HIGH (ref 3.8–10.5)
WBC # FLD AUTO: 19.66 K/UL — HIGH (ref 3.8–10.5)
WBC # FLD AUTO: 22.17 K/UL — HIGH (ref 3.8–10.5)
WBC # FLD AUTO: 23.66 K/UL — HIGH (ref 3.8–10.5)

## 2021-02-21 PROCEDURE — 71045 X-RAY EXAM CHEST 1 VIEW: CPT | Mod: 26

## 2021-02-21 RX ORDER — DEXTROSE 50 % IN WATER 50 %
50 SYRINGE (ML) INTRAVENOUS ONCE
Refills: 0 | Status: COMPLETED | OUTPATIENT
Start: 2021-02-21 | End: 2021-02-21

## 2021-02-21 RX ORDER — EPINEPHRINE 0.3 MG/.3ML
2 INJECTION INTRAMUSCULAR; SUBCUTANEOUS
Qty: 8 | Refills: 0 | Status: DISCONTINUED | OUTPATIENT
Start: 2021-02-21 | End: 2021-02-21

## 2021-02-21 RX ORDER — NOREPINEPHRINE BITARTRATE/D5W 8 MG/250ML
5 PLASTIC BAG, INJECTION (ML) INTRAVENOUS
Qty: 32 | Refills: 0 | Status: DISCONTINUED | OUTPATIENT
Start: 2021-02-21 | End: 2021-02-22

## 2021-02-21 RX ORDER — EPINEPHRINE 0.3 MG/.3ML
0.1 INJECTION INTRAMUSCULAR; SUBCUTANEOUS
Qty: 4 | Refills: 0 | Status: DISCONTINUED | OUTPATIENT
Start: 2021-02-21 | End: 2021-02-21

## 2021-02-21 RX ORDER — PHENYLEPHRINE HYDROCHLORIDE 10 MG/ML
1 INJECTION INTRAVENOUS
Qty: 40 | Refills: 0 | Status: DISCONTINUED | OUTPATIENT
Start: 2021-02-21 | End: 2021-02-21

## 2021-02-21 RX ORDER — DEXTROSE 10 % IN WATER 10 %
1000 INTRAVENOUS SOLUTION INTRAVENOUS
Refills: 0 | Status: DISCONTINUED | OUTPATIENT
Start: 2021-02-21 | End: 2021-02-22

## 2021-02-21 RX ORDER — SODIUM BICARBONATE 1 MEQ/ML
100 SYRINGE (ML) INTRAVENOUS ONCE
Refills: 0 | Status: COMPLETED | OUTPATIENT
Start: 2021-02-21 | End: 2021-02-21

## 2021-02-21 RX ORDER — PHENYLEPHRINE HYDROCHLORIDE 10 MG/ML
10 INJECTION INTRAVENOUS
Qty: 160 | Refills: 0 | Status: DISCONTINUED | OUTPATIENT
Start: 2021-02-21 | End: 2021-02-22

## 2021-02-21 RX ORDER — CALCIUM GLUCONATE 100 MG/ML
1 VIAL (ML) INTRAVENOUS ONCE
Refills: 0 | Status: COMPLETED | OUTPATIENT
Start: 2021-02-21 | End: 2021-02-21

## 2021-02-21 RX ORDER — SODIUM BICARBONATE 1 MEQ/ML
0.18 SYRINGE (ML) INTRAVENOUS
Qty: 150 | Refills: 0 | Status: DISCONTINUED | OUTPATIENT
Start: 2021-02-21 | End: 2021-02-22

## 2021-02-21 RX ORDER — ALBUMIN HUMAN 25 %
50 VIAL (ML) INTRAVENOUS EVERY 8 HOURS
Refills: 0 | Status: DISCONTINUED | OUTPATIENT
Start: 2021-02-21 | End: 2021-02-22

## 2021-02-21 RX ORDER — EPINEPHRINE 0.3 MG/.3ML
0.4 INJECTION INTRAMUSCULAR; SUBCUTANEOUS
Qty: 4 | Refills: 0 | Status: DISCONTINUED | OUTPATIENT
Start: 2021-02-21 | End: 2021-02-21

## 2021-02-21 RX ORDER — EPINEPHRINE 0.3 MG/.3ML
0.5 INJECTION INTRAMUSCULAR; SUBCUTANEOUS
Qty: 4 | Refills: 0 | Status: DISCONTINUED | OUTPATIENT
Start: 2021-02-21 | End: 2021-02-21

## 2021-02-21 RX ORDER — EPINEPHRINE 0.3 MG/.3ML
4 INJECTION INTRAMUSCULAR; SUBCUTANEOUS
Qty: 8 | Refills: 0 | Status: DISCONTINUED | OUTPATIENT
Start: 2021-02-21 | End: 2021-02-21

## 2021-02-21 RX ORDER — PHENYLEPHRINE HYDROCHLORIDE 10 MG/ML
3.5 INJECTION INTRAVENOUS
Qty: 160 | Refills: 0 | Status: DISCONTINUED | OUTPATIENT
Start: 2021-02-21 | End: 2021-02-21

## 2021-02-21 RX ORDER — EPINEPHRINE 0.3 MG/.3ML
2 INJECTION INTRAMUSCULAR; SUBCUTANEOUS
Qty: 8 | Refills: 0 | Status: DISCONTINUED | OUTPATIENT
Start: 2021-02-21 | End: 2021-02-22

## 2021-02-21 RX ORDER — VASOPRESSIN 20 [USP'U]/ML
0.04 INJECTION INTRAVENOUS
Qty: 50 | Refills: 0 | Status: DISCONTINUED | OUTPATIENT
Start: 2021-02-21 | End: 2021-02-22

## 2021-02-21 RX ADMIN — Medication 50 MILLILITER(S): at 15:05

## 2021-02-21 RX ADMIN — CHLORHEXIDINE GLUCONATE 15 MILLILITER(S): 213 SOLUTION TOPICAL at 04:22

## 2021-02-21 RX ADMIN — Medication 389 MICROGRAM(S)/KG/MIN: at 12:03

## 2021-02-21 RX ADMIN — Medication 73.8 MICROGRAM(S)/KG/MIN: at 02:56

## 2021-02-21 RX ADMIN — Medication 389 MICROGRAM(S)/KG/MIN: at 10:04

## 2021-02-21 RX ADMIN — PHENYLEPHRINE HYDROCHLORIDE 54.4 MICROGRAM(S)/KG/MIN: 10 INJECTION INTRAVENOUS at 03:44

## 2021-02-21 RX ADMIN — Medication 389 MICROGRAM(S)/KG/MIN: at 23:26

## 2021-02-21 RX ADMIN — EPINEPHRINE 155 MICROGRAM(S)/KG/MIN: 0.3 INJECTION INTRAMUSCULAR; SUBCUTANEOUS at 11:03

## 2021-02-21 RX ADMIN — Medication 389 MICROGRAM(S)/KG/MIN: at 13:00

## 2021-02-21 RX ADMIN — EPINEPHRINE 155 MICROGRAM(S)/KG/MIN: 0.3 INJECTION INTRAMUSCULAR; SUBCUTANEOUS at 10:52

## 2021-02-21 RX ADMIN — EPINEPHRINE 311 MICROGRAM(S)/KG/MIN: 0.3 INJECTION INTRAMUSCULAR; SUBCUTANEOUS at 22:10

## 2021-02-21 RX ADMIN — EPINEPHRINE 155 MICROGRAM(S)/KG/MIN: 0.3 INJECTION INTRAMUSCULAR; SUBCUTANEOUS at 08:05

## 2021-02-21 RX ADMIN — Medication 100 MILLIEQUIVALENT(S): at 17:14

## 2021-02-21 RX ADMIN — Medication 389 MICROGRAM(S)/KG/MIN: at 09:15

## 2021-02-21 RX ADMIN — CHLORHEXIDINE GLUCONATE 15 MILLILITER(S): 213 SOLUTION TOPICAL at 17:15

## 2021-02-21 RX ADMIN — PANTOPRAZOLE SODIUM 40 MILLIGRAM(S): 20 TABLET, DELAYED RELEASE ORAL at 04:22

## 2021-02-21 RX ADMIN — PHENYLEPHRINE HYDROCHLORIDE 155 MICROGRAM(S)/KG/MIN: 10 INJECTION INTRAVENOUS at 12:04

## 2021-02-21 RX ADMIN — Medication 389 MICROGRAM(S)/KG/MIN: at 15:56

## 2021-02-21 RX ADMIN — PHENYLEPHRINE HYDROCHLORIDE 31.1 MICROGRAM(S)/KG/MIN: 10 INJECTION INTRAVENOUS at 03:15

## 2021-02-21 RX ADMIN — Medication 100 GRAM(S): at 23:25

## 2021-02-21 RX ADMIN — Medication 389 MICROGRAM(S)/KG/MIN: at 21:36

## 2021-02-21 RX ADMIN — PIPERACILLIN AND TAZOBACTAM 25 GRAM(S): 4; .5 INJECTION, POWDER, LYOPHILIZED, FOR SOLUTION INTRAVENOUS at 04:22

## 2021-02-21 RX ADMIN — Medication 100 MEQ/KG/HR: at 16:25

## 2021-02-21 RX ADMIN — PIPERACILLIN AND TAZOBACTAM 25 GRAM(S): 4; .5 INJECTION, POWDER, LYOPHILIZED, FOR SOLUTION INTRAVENOUS at 17:16

## 2021-02-21 RX ADMIN — Medication 100 MEQ/KG/HR: at 04:22

## 2021-02-21 RX ADMIN — EPINEPHRINE 311 MICROGRAM(S)/KG/MIN: 0.3 INJECTION INTRAMUSCULAR; SUBCUTANEOUS at 23:26

## 2021-02-21 RX ADMIN — Medication 389 MICROGRAM(S)/KG/MIN: at 14:00

## 2021-02-21 RX ADMIN — PANTOPRAZOLE SODIUM 40 MILLIGRAM(S): 20 TABLET, DELAYED RELEASE ORAL at 17:15

## 2021-02-21 RX ADMIN — EPINEPHRINE 622 MICROGRAM(S)/KG/MIN: 0.3 INJECTION INTRAMUSCULAR; SUBCUTANEOUS at 13:10

## 2021-02-21 RX ADMIN — Medication 50 MILLILITER(S): at 14:33

## 2021-02-21 RX ADMIN — EPINEPHRINE 155 MICROGRAM(S)/KG/MIN: 0.3 INJECTION INTRAMUSCULAR; SUBCUTANEOUS at 12:00

## 2021-02-21 RX ADMIN — Medication 100 MILLIEQUIVALENT(S): at 10:20

## 2021-02-21 RX ADMIN — Medication 50 MILLILITER(S): at 21:47

## 2021-02-21 RX ADMIN — Medication 250 MILLIGRAM(S): at 17:14

## 2021-02-21 RX ADMIN — EPINEPHRINE 155 MICROGRAM(S)/KG/MIN: 0.3 INJECTION INTRAMUSCULAR; SUBCUTANEOUS at 10:04

## 2021-02-21 RX ADMIN — PHENYLEPHRINE HYDROCHLORIDE 155 MICROGRAM(S)/KG/MIN: 10 INJECTION INTRAVENOUS at 09:57

## 2021-02-21 RX ADMIN — EPINEPHRINE 155 MICROGRAM(S)/KG/MIN: 0.3 INJECTION INTRAMUSCULAR; SUBCUTANEOUS at 09:30

## 2021-02-21 RX ADMIN — PHENYLEPHRINE HYDROCHLORIDE 155 MICROGRAM(S)/KG/MIN: 10 INJECTION INTRAVENOUS at 14:10

## 2021-02-21 RX ADMIN — Medication 389 MICROGRAM(S)/KG/MIN: at 08:13

## 2021-02-21 RX ADMIN — EPINEPHRINE 311 MICROGRAM(S)/KG/MIN: 0.3 INJECTION INTRAMUSCULAR; SUBCUTANEOUS at 21:36

## 2021-02-21 RX ADMIN — EPINEPHRINE 622 MICROGRAM(S)/KG/MIN: 0.3 INJECTION INTRAMUSCULAR; SUBCUTANEOUS at 12:30

## 2021-02-21 RX ADMIN — Medication 50 MILLILITER(S): at 02:59

## 2021-02-21 RX ADMIN — PHENYLEPHRINE HYDROCHLORIDE 155 MICROGRAM(S)/KG/MIN: 10 INJECTION INTRAVENOUS at 21:36

## 2021-02-21 NOTE — PROGRESS NOTE ADULT - SUBJECTIVE AND OBJECTIVE BOX
Patient seen and examined at the bedside. She is POD4 from b/l mastectomy and POD2 from b/l breast exploration, evacuation of hematoma, washout, and packing placement. Patient's condition remains critical. She is on four pressors maxed out with SBP remaining in the 60's. Patient's creatinine and coagulation profile continue to trend up. Patient has put out ~1200 cc of sanguinous fluid from her breast drains since the hematoma evacuation and packing. H&H continue to trend down.     Physical exam:   Gen: intubated, not sedated  Resp:  vent settings  RR 30     mL    FiO2 70%   PEEP 5, not noted to be overbreathing the vent   Breast: dressings in place are c/d/i with mild staining of the L breast dressing, 4 ALEJANDRO drains in place, 3 to wall canister suction, the fourth with air leak from the end inside the breast, ~1200 cc of sanguinous fluid in the canisters  Abd: less distended, soft   Neuro: GCS3T, no cough reflex on suctioning, pupils fixed and dilated  : minimal urine in the bag  GI: reddish brown fluid in the rectal tube bag     Vital Signs Last 24 Hrs  T(C): 35.6 (21 Feb 2021 06:00), Max: 36.3 (20 Feb 2021 17:00)  T(F): 96 (21 Feb 2021 06:00), Max: 97.3 (20 Feb 2021 17:00)  HR: 118 (21 Feb 2021 09:00) (109 - 133)  BP: 88/45 (21 Feb 2021 09:00) (83/49 - 131/65)  BP(mean): 55 (21 Feb 2021 09:00) (53 - 82)  RR: 30 (21 Feb 2021 09:00) (22 - 30)  SpO2: 100% (21 Feb 2021 09:00) (94% - 100%)    02-21    145  |  106  |  24<H>  ----------------------------<  83  3.7   |  11<L>  |  4.18<H>    Ca    6.8<L>      21 Feb 2021 05:01  Phos  4.7     02-21  Mg     1.4     02-21    TPro  3.7<L>  /  Alb  1.7<L>  /  TBili  5.4<H>  /  DBili  x   /  AST  3699<H>  /  ALT  1780<H>  /  AlkPhos  236<H>  02-21                          10.6   x     )-----------( 51       ( 21 Feb 2021 09:27 )             32.0

## 2021-02-21 NOTE — CHART NOTE - NSCHARTNOTEFT_GEN_A_CORE
I spoke with Mr Montoya ,  antonio sister in icu and with a Dr Daily Anesthesiologist ( on speaker phone) .  asked for me to speak with Dr Daily to help with communications and explanations. We discussed events of care , code , returning to OR  etc. They are aware pat is MOSF shock requiring 4 pressors at max doses to maintain a SBP ~ 80's.    asked " what do we do now?'.. I explained to him that all supportive measures are being given to her and will continue current care but prognosis is grave.   remained at bedside with antonio saleem

## 2021-02-21 NOTE — PROGRESS NOTE ADULT - ASSESSMENT
46 yo female with hx of HTN, breast CA (dx in 7/2020) s/p chemo (completed in 10/2020), DVt  [facility](8/2020 on eliquis)  was admitted to Surgical unit for elective for  right breast total mastectomy with preop needle localization and right breast sentinel node bx, left breast prophylactic total mastectomy with left breast preop needle localization on 2/17/2021. Post OR pt was RRT on 2/17 night for profuse diaphoresis and lightheadedness and hypotension. Pt was given IVF. On 2/18 her Hb in am dropped to 8 from 11 and pt was transfused 2 RBCs .Around 19:45 pt was RRT as pt is found unresponsive and  then went pulseless. Code blue was called and CPR was initiated. R femoral CVC was placed. ROSC was achieved after 8 mins( 3 rounds of Epi+ sod bicarb x1, D50 x 1, rhythm was asystole) . Pt was  bleeding in the ALEJANDRO drains( 500 cc in total from 7am-7pm shift).  CBC returned with hemoglobin 5.1. Decision was therefore made to take the patient to the operating room to pursue active bleeding. Patient taken to OR, the left mastectomy site was opened. Two small pumping vessels were found and ligated. Patient appeared to be in DIC and had blood oozing from bare surfaces including around her central line. The surgical site was promptly closed after confirmation of no continued bleeding. She was taken to ICU for continued resuscitation.  2 given preop in ICU, 2 U PRBC given intraop with 4 of FFP and 1 of platelets. Urine output was minimal during the case. Pt came back to ICU post OR .     Problem #  Coagulopathy  -tx 4 u FFP   -Fibrinogen 107   -f/u Fibrinogen   -Not presently in DIC  -recommend trend fibrinogen   -tx FFP / Cryo if fibrinogen declines < 150.    Problem # Prognosis   -poor   -GOC to be discussed.    Will follow. Please call for questions [phone].

## 2021-02-21 NOTE — PROGRESS NOTE ADULT - ASSESSMENT
45 year-old woman with MARTINA 2/2 ATN following cardiac arrest.  Acidosis due to lactic acidosis.    1. MARTINA 2/2 ATN- s/p cardiac arrest.  Minimal urine output.  Pt becoming anasarcic and now has pulmonary edema.    Pt with coagulopathy with high/rising INR worsening still.  Plan was initially to try to place HD catheter and dialyze to optimize volume status, but given coagulopathy with high INR and high risk of bleeding, risks outweighed benefits at that time.  Now pt is hypotensive on 4 pressors at max and is no longer stable enough for dialysis at this time.    2. Acidosis- due to lactic acidosis, worsening.  Pt not stable for HD at this time.  Continue sodium bicarbonate gtt. Monitor acid-base status. Continue care per ICU team.    3. Anasarca, now also with pulmonary edema- trial of lasix gtt 2/19 was unsuccessful. Now pt is unstable for any volume removal due to hypotension.        4. Electrolyte abnormalities  a. Hypocalcemia- monitor ionized calcium. Replete with calcium chloride through central line as necessary.  b. Hypomagnesemia- replete IV  c. Hyperphosphatemia- in the setting of renal failure.  At this time, no need to treat in the acute setting.    At this point, prognosis is grave. Pt is likely actively dying now.  D/w family at length.

## 2021-02-21 NOTE — PROGRESS NOTE ADULT - SUBJECTIVE AND OBJECTIVE BOX
Patient is a 45y old  Female who presents with a chief complaint of Cardiac Arrest (19 Feb 2021 13:45)      INTERVAL HPI/OVERNIGHT EVENTS:all labs were followed Q4   s/p wound exploration and left sided hematoma evacuation( 2L) on 2/18  s/p second wound exploration on 2/19, Hematomas of L and R breast were evacuated. No fresh bleeding identified, only oozing. The breast cavities were packed with krilex and left open, bicarb drip was restarted .   Hb 8->5.1-->7.9-->6.9? 6.9? 7.7? 12.5->14> 12.9> 12.7 > 18.6 >17.4  Lactate: 17.6>17>13.4  Trop: 0.415>1.26>2.12>9.6?8   CK: 5220>14K>13k   RF removed      Nephro and vascular consulted for dialysis but not proceeding at this time due to increased INR and bleeding risk. Bicarb and lasix drip discontinued. Received 2 units of FFP in preparation for shiley placement and HD. However, HD held for increased bleeding risk.       ICU Vital Signs Last 24 Hrs  T(C): 36 (20 Feb 2021 23:40), Max: 36.3 (20 Feb 2021 17:00)  T(F): 96.8 (20 Feb 2021 23:40), Max: 97.3 (20 Feb 2021 17:00)  HR: 128 (20 Feb 2021 23:45) (109 - 133)  BP: 113/54 (20 Feb 2021 23:00) (92/53 - 131/65)  BP(mean): 68 (20 Feb 2021 23:00) (61 - 82)  ABP: 89/55 (20 Feb 2021 23:45) (70/42 - 148/78)  ABP(mean): 69 (20 Feb 2021 23:45) (53 - 102)  RR: 30 (20 Feb 2021 23:45) (18 - 32)  SpO2: 96% (20 Feb 2021 23:45) (92% - 100%)    I&O's Summary    19 Feb 2021 07:01  -  20 Feb 2021 07:00  --------------------------------------------------------  IN: 5837.1 mL / OUT: 2020 mL / NET: 3817.1 mL    20 Feb 2021 07:01  -  21 Feb 2021 00:16  --------------------------------------------------------  IN: 4157.1 mL / OUT: 545 mL / NET: 3612.1 mL      Mode: AC/ CMV (Assist Control/ Continuous Mandatory Ventilation)  RR (machine): 30  TV (machine): 500  FiO2: 75  PEEP: 5  ITime: 0.8  MAP: 13  PIP: 31      LABS:                        12.7   20.00 )-----------( 72       ( 20 Feb 2021 23:29 )             37.3     02-20    142  |  105  |  23<H>  ----------------------------<  114<H>  3.7   |  11<L>  |  x     Ca    6.6<L>      20 Feb 2021 23:29  Phos  4.2     02-20  Mg     1.4     02-20    TPro  x   /  Alb  1.6<L>  /  TBili  x   /  DBili  x   /  AST  x   /  ALT  x   /  AlkPhos  x   02-20    PT/INR - ( 20 Feb 2021 20:19 )   PT: 31.0 sec;   INR: 2.73 ratio         PTT - ( 20 Feb 2021 20:19 )  PTT:34.0 sec    CAPILLARY BLOOD GLUCOSE      POCT Blood Glucose.: 119 mg/dL (20 Feb 2021 23:00)  POCT Blood Glucose.: 161 mg/dL (20 Feb 2021 18:31)  POCT Blood Glucose.: 126 mg/dL (20 Feb 2021 15:55)  POCT Blood Glucose.: 133 mg/dL (20 Feb 2021 14:20)  POCT Blood Glucose.: 168 mg/dL (20 Feb 2021 12:28)  POCT Blood Glucose.: 63 mg/dL (20 Feb 2021 10:59)  POCT Blood Glucose.: 102 mg/dL (20 Feb 2021 09:26)    ABG - ( 20 Feb 2021 23:41 )  pH, Arterial: 7.26  pH, Blood: x     /  pCO2: 23    /  pO2: 83    / HCO3: 10    / Base Excess: -15.9 /  SaO2: 96                  RADIOLOGY & ADDITIONAL TESTS:    Consultant(s) Notes Reviewed:  [x ] YES  [ ] NO    MEDICATIONS  (STANDING):  chlorhexidine 0.12% Liquid 15 milliLiter(s) Oral Mucosa every 12 hours  dextrose 10%. 1000 milliLiter(s) (20 mL/Hr) IV Continuous <Continuous>  heparin Lock (1,000 Units/mL) Injectable 1 Dose(s) Catheter once  heparin Lock (1,000 Units/mL) Injectable 1 Dose(s) Catheter once  norepinephrine Infusion 0.95 MICROgram(s)/kG/Min (73.8 mL/Hr) IV Continuous <Continuous>  pantoprazole  Injectable 40 milliGRAM(s) IV Push every 12 hours  piperacillin/tazobactam IVPB.. 3.375 Gram(s) IV Intermittent every 12 hours  sodium bicarbonate  Infusion 0.181 mEq/kG/Hr (100 mL/Hr) IV Continuous <Continuous>  vancomycin  IVPB 1000 milliGRAM(s) IV Intermittent every 24 hours  vancomycin  IVPB        MEDICATIONS  (PRN):      PHYSICAL EXAM:  GENERAL: well built, well nourished  HEAD:  Atraumatic, Normocephalic  EYES: EOMI, PERRLA, conjunctiva and sclera clear  ENT: No tonsillar erythema, exudates, or enlargement; Moist mucous membranes, Good dentition, No lesions  NECK: Supple, No JVD, Normal thyroid, no enlarged nodes  NERVOUS SYSTEM:  Alert & Oriented X3, Good concentration; Motor Strength 5/5 B/L upper and lower extremities; DTRs 2+ intact and symmetric, sensory intact  CHEST/LUNG: B/L good air entry; No rales, rhonchi, or wheezing  HEART: S1S2 normal, no S3, Regular rate and rhythm; No murmurs, rubs, or gallops  ABDOMEN: Soft, Nontender, Nondistended; Bowel sounds present  EXTREMITIES:  2+ Peripheral Pulses, No clubbing, cyanosis, or edema  LYMPH: No lymphadenopathy noted  SKIN: No rashes or lesions    Care Discussed with Consultants/Other Providers [ x] YES  [ ] NO Patient is a 45y old  Female who presents with a chief complaint of Cardiac Arrest (19 Feb 2021 13:45)      INTERVAL HPI/OVERNIGHT EVENTS:all labs (lactate , cmp , dic panel , vbg ) were followed Q4   s/p wound exploration and left sided hematoma evacuation( 2L) on 2/18  s/p second wound exploration on 2/19, Hematomas of L and R breast were evacuated. No fresh bleeding identified, only oozing. The breast cavities were packed with krilex and left open, bicarb drip was restarted .    RF removed      Nephro and vascular consulted for dialysis but not proceeding at this time due to increased INR and bleeding risk.   ICU Vital Signs Last 24 Hrs  T(C): 36 (20 Feb 2021 23:40), Max: 36.3 (20 Feb 2021 17:00)  T(F): 96.8 (20 Feb 2021 23:40), Max: 97.3 (20 Feb 2021 17:00)  HR: 128 (20 Feb 2021 23:45) (109 - 133)  BP: 113/54 (20 Feb 2021 23:00) (92/53 - 131/65)  BP(mean): 68 (20 Feb 2021 23:00) (61 - 82)  ABP: 89/55 (20 Feb 2021 23:45) (70/42 - 148/78)  ABP(mean): 69 (20 Feb 2021 23:45) (53 - 102)  RR: 30 (20 Feb 2021 23:45) (18 - 32)  SpO2: 96% (20 Feb 2021 23:45) (92% - 100%)    I&O's Summary    19 Feb 2021 07:01  -  20 Feb 2021 07:00  --------------------------------------------------------  IN: 5837.1 mL / OUT: 2020 mL / NET: 3817.1 mL    20 Feb 2021 07:01  -  21 Feb 2021 00:16  --------------------------------------------------------  IN: 4157.1 mL / OUT: 545 mL / NET: 3612.1 mL      Mode: AC/ CMV (Assist Control/ Continuous Mandatory Ventilation)  RR (machine): 30  TV (machine): 500  FiO2: 75  PEEP: 5  ITime: 0.8  MAP: 13  PIP: 31      LABS:                        12.7   20.00 )-----------( 72       ( 20 Feb 2021 23:29 )             37.3     02-20    142  |  105  |  23<H>  ----------------------------<  114<H>  3.7   |  11<L>  |  x     Ca    6.6<L>      20 Feb 2021 23:29  Phos  4.2     02-20  Mg     1.4     02-20    TPro  x   /  Alb  1.6<L>  /  TBili  x   /  DBili  x   /  AST  x   /  ALT  x   /  AlkPhos  x   02-20    PT/INR - ( 20 Feb 2021 20:19 )   PT: 31.0 sec;   INR: 2.73 ratio         PTT - ( 20 Feb 2021 20:19 )  PTT:34.0 sec    CAPILLARY BLOOD GLUCOSE      POCT Blood Glucose.: 119 mg/dL (20 Feb 2021 23:00)  POCT Blood Glucose.: 161 mg/dL (20 Feb 2021 18:31)  POCT Blood Glucose.: 126 mg/dL (20 Feb 2021 15:55)  POCT Blood Glucose.: 133 mg/dL (20 Feb 2021 14:20)  POCT Blood Glucose.: 168 mg/dL (20 Feb 2021 12:28)  POCT Blood Glucose.: 63 mg/dL (20 Feb 2021 10:59)  POCT Blood Glucose.: 102 mg/dL (20 Feb 2021 09:26)    ABG - ( 20 Feb 2021 23:41 )  pH, Arterial: 7.26  pH, Blood: x     /  pCO2: 23    /  pO2: 83    / HCO3: 10    / Base Excess: -15.9 /  SaO2: 96                  RADIOLOGY & ADDITIONAL TESTS:    Consultant(s) Notes Reviewed:  [x ] YES  [ ] NO    MEDICATIONS  (STANDING):  chlorhexidine 0.12% Liquid 15 milliLiter(s) Oral Mucosa every 12 hours  dextrose 10%. 1000 milliLiter(s) (20 mL/Hr) IV Continuous <Continuous>  heparin Lock (1,000 Units/mL) Injectable 1 Dose(s) Catheter once  heparin Lock (1,000 Units/mL) Injectable 1 Dose(s) Catheter once  norepinephrine Infusion 0.95 MICROgram(s)/kG/Min (73.8 mL/Hr) IV Continuous <Continuous>  pantoprazole  Injectable 40 milliGRAM(s) IV Push every 12 hours  piperacillin/tazobactam IVPB.. 3.375 Gram(s) IV Intermittent every 12 hours  sodium bicarbonate  Infusion 0.181 mEq/kG/Hr (100 mL/Hr) IV Continuous <Continuous>  vancomycin  IVPB 1000 milliGRAM(s) IV Intermittent every 24 hours  vancomycin  IVPB        MEDICATIONS  (PRN):      PHYSICAL EXAM:  GENERAL: well built,+ETT   HEAD:  Atraumatic, Normocephalic  EYES:non reactive dilated pupil   ENT: No tonsillar erythema, exudates, or enlargement; Moist mucous membranes, Good dentition, No lesions  NECK: Supple, No JVD, Normal thyroid, no enlarged nodes  NERVOUS SYSTEM:  obtunded   CHEST/LUNG: B/L good air entry; breast drain  HEART: S1S2 normal, no S3, Regular rate and rhythm; No murmurs, rubs, or gallops  ABDOMEN: Soft, Nontender, Nondistended; Bowel sounds present  EXTREMITIES:  2+ Peripheral Pulses, No clubbing, cyanosis, or edema  LYMPH: No lymphadenopathy noted  SKIN: No rashes or lesions    Care Discussed with Consultants/Other Providers [ x] YES  [ ] NO Patient is a 45y old  Female who presents with a chief complaint of Cardiac Arrest (19 Feb 2021 13:45)      INTERVAL HPI/OVERNIGHT EVENTS:all labs (lactate , cmp , dic panel , vbg ) were followed Q4   s/p wound exploration and left sided hematoma evacuation( 2L) on 2/18  s/p second wound exploration on 2/19, Hematomas of L and R breast were evacuated. No fresh bleeding identified, only oozing. The breast cavities were packed with krilex and left open, bicarb drip was restarted . B was on lower side, pheny is added to levophyd and patient is started on albumin for 2 days   ICU Vital Signs Last 24 Hrs  T(C): 36 (20 Feb 2021 23:40), Max: 36.3 (20 Feb 2021 17:00)  T(F): 96.8 (20 Feb 2021 23:40), Max: 97.3 (20 Feb 2021 17:00)  HR: 128 (20 Feb 2021 23:45) (109 - 133)  BP: 113/54 (20 Feb 2021 23:00) (92/53 - 131/65)  BP(mean): 68 (20 Feb 2021 23:00) (61 - 82)  ABP: 89/55 (20 Feb 2021 23:45) (70/42 - 148/78)  ABP(mean): 69 (20 Feb 2021 23:45) (53 - 102)  RR: 30 (20 Feb 2021 23:45) (18 - 32)  SpO2: 96% (20 Feb 2021 23:45) (92% - 100%)    I&O's Summary    19 Feb 2021 07:01  -  20 Feb 2021 07:00  --------------------------------------------------------  IN: 5837.1 mL / OUT: 2020 mL / NET: 3817.1 mL    20 Feb 2021 07:01  -  21 Feb 2021 00:16  --------------------------------------------------------  IN: 4157.1 mL / OUT: 545 mL / NET: 3612.1 mL      Mode: AC/ CMV (Assist Control/ Continuous Mandatory Ventilation)  RR (machine): 30  TV (machine): 500  FiO2: 75  PEEP: 5  ITime: 0.8  MAP: 13  PIP: 31      LABS:                        12.7   20.00 )-----------( 72       ( 20 Feb 2021 23:29 )             37.3     02-20    142  |  105  |  23<H>  ----------------------------<  114<H>  3.7   |  11<L>  |  x     Ca    6.6<L>      20 Feb 2021 23:29  Phos  4.2     02-20  Mg     1.4     02-20    TPro  x   /  Alb  1.6<L>  /  TBili  x   /  DBili  x   /  AST  x   /  ALT  x   /  AlkPhos  x   02-20    PT/INR - ( 20 Feb 2021 20:19 )   PT: 31.0 sec;   INR: 2.73 ratio         PTT - ( 20 Feb 2021 20:19 )  PTT:34.0 sec    CAPILLARY BLOOD GLUCOSE      POCT Blood Glucose.: 119 mg/dL (20 Feb 2021 23:00)  POCT Blood Glucose.: 161 mg/dL (20 Feb 2021 18:31)  POCT Blood Glucose.: 126 mg/dL (20 Feb 2021 15:55)  POCT Blood Glucose.: 133 mg/dL (20 Feb 2021 14:20)  POCT Blood Glucose.: 168 mg/dL (20 Feb 2021 12:28)  POCT Blood Glucose.: 63 mg/dL (20 Feb 2021 10:59)  POCT Blood Glucose.: 102 mg/dL (20 Feb 2021 09:26)    ABG - ( 20 Feb 2021 23:41 )  pH, Arterial: 7.26  pH, Blood: x     /  pCO2: 23    /  pO2: 83    / HCO3: 10    / Base Excess: -15.9 /  SaO2: 96                  RADIOLOGY & ADDITIONAL TESTS:    Consultant(s) Notes Reviewed:  [x ] YES  [ ] NO    MEDICATIONS  (STANDING):  chlorhexidine 0.12% Liquid 15 milliLiter(s) Oral Mucosa every 12 hours  dextrose 10%. 1000 milliLiter(s) (20 mL/Hr) IV Continuous <Continuous>  heparin Lock (1,000 Units/mL) Injectable 1 Dose(s) Catheter once  heparin Lock (1,000 Units/mL) Injectable 1 Dose(s) Catheter once  norepinephrine Infusion 0.95 MICROgram(s)/kG/Min (73.8 mL/Hr) IV Continuous <Continuous>  pantoprazole  Injectable 40 milliGRAM(s) IV Push every 12 hours  piperacillin/tazobactam IVPB.. 3.375 Gram(s) IV Intermittent every 12 hours  sodium bicarbonate  Infusion 0.181 mEq/kG/Hr (100 mL/Hr) IV Continuous <Continuous>  vancomycin  IVPB 1000 milliGRAM(s) IV Intermittent every 24 hours  vancomycin  IVPB        MEDICATIONS  (PRN):      PHYSICAL EXAM:  GENERAL: well built,+ETT   HEAD:  Atraumatic, Normocephalic  EYES:non reactive dilated pupil   ENT: No tonsillar erythema, exudates, or enlargement; Moist mucous membranes, Good dentition, No lesions  NECK: Supple, No JVD, Normal thyroid, no enlarged nodes  NERVOUS SYSTEM:  obtunded   CHEST/LUNG: B/L good air entry; breast drain  HEART: S1S2 normal, no S3, Regular rate and rhythm; No murmurs, rubs, or gallops  ABDOMEN: Soft, Nontender, Nondistended; Bowel sounds present  EXTREMITIES:  2+ Peripheral Pulses, No clubbing, cyanosis, or edema  LYMPH: No lymphadenopathy noted  SKIN: No rashes or lesions    Care Discussed with Consultants/Other Providers [ x] YES  [ ] NO Patient is a 45y old  Female who presents with a chief complaint of Cardiac Arrest (19 Feb 2021 13:45)      INTERVAL HPI/OVERNIGHT EVENTS:all labs (lactate , cmp , dic panel , vbg ) were followed Q4   s/p wound exploration and left sided hematoma evacuation( 2L) on 2/18  s/p second wound exploration on 2/19, Hematomas of L and R breast were evacuated. No fresh bleeding identified, only oozing. The breast cavities were packed with krilex and left open, bicarb drip was restarted . B was on lower side, pheny is added to levophyd and patient is started on albumin for 2 days     On 2/18, while ICU team was signing out on 6N, commotion heard coming from patients room. Patient apparently syncopized and was found slumped over her bed. PGY-2 Gandhi went into room, and when patient was found with no pulse, CPR was initiated on the floor.    patient's spouse was called by attending, and family came to ICU to visit    ICU Vital Signs Last 24 Hrs  T(C): 36 (20 Feb 2021 23:40), Max: 36.3 (20 Feb 2021 17:00)  T(F): 96.8 (20 Feb 2021 23:40), Max: 97.3 (20 Feb 2021 17:00)  HR: 128 (20 Feb 2021 23:45) (109 - 133)  BP: 113/54 (20 Feb 2021 23:00) (92/53 - 131/65)  BP(mean): 68 (20 Feb 2021 23:00) (61 - 82)  ABP: 89/55 (20 Feb 2021 23:45) (70/42 - 148/78)  ABP(mean): 69 (20 Feb 2021 23:45) (53 - 102)  RR: 30 (20 Feb 2021 23:45) (18 - 32)  SpO2: 96% (20 Feb 2021 23:45) (92% - 100%)    I&O's Summary    19 Feb 2021 07:01  -  20 Feb 2021 07:00  --------------------------------------------------------  IN: 5837.1 mL / OUT: 2020 mL / NET: 3817.1 mL    20 Feb 2021 07:01  -  21 Feb 2021 00:16  --------------------------------------------------------  IN: 4157.1 mL / OUT: 545 mL / NET: 3612.1 mL      Mode: AC/ CMV (Assist Control/ Continuous Mandatory Ventilation)  RR (machine): 30  TV (machine): 500  FiO2: 75  PEEP: 5  ITime: 0.8  MAP: 13  PIP: 31      LABS:                        12.7   20.00 )-----------( 72       ( 20 Feb 2021 23:29 )             37.3     02-20    142  |  105  |  23<H>  ----------------------------<  114<H>  3.7   |  11<L>  |  x     Ca    6.6<L>      20 Feb 2021 23:29  Phos  4.2     02-20  Mg     1.4     02-20    TPro  x   /  Alb  1.6<L>  /  TBili  x   /  DBili  x   /  AST  x   /  ALT  x   /  AlkPhos  x   02-20    PT/INR - ( 20 Feb 2021 20:19 )   PT: 31.0 sec;   INR: 2.73 ratio         PTT - ( 20 Feb 2021 20:19 )  PTT:34.0 sec    CAPILLARY BLOOD GLUCOSE      POCT Blood Glucose.: 119 mg/dL (20 Feb 2021 23:00)  POCT Blood Glucose.: 161 mg/dL (20 Feb 2021 18:31)  POCT Blood Glucose.: 126 mg/dL (20 Feb 2021 15:55)  POCT Blood Glucose.: 133 mg/dL (20 Feb 2021 14:20)  POCT Blood Glucose.: 168 mg/dL (20 Feb 2021 12:28)  POCT Blood Glucose.: 63 mg/dL (20 Feb 2021 10:59)  POCT Blood Glucose.: 102 mg/dL (20 Feb 2021 09:26)    ABG - ( 20 Feb 2021 23:41 )  pH, Arterial: 7.26  pH, Blood: x     /  pCO2: 23    /  pO2: 83    / HCO3: 10    / Base Excess: -15.9 /  SaO2: 96                  RADIOLOGY & ADDITIONAL TESTS:    Consultant(s) Notes Reviewed:  [x ] YES  [ ] NO    MEDICATIONS  (STANDING):  chlorhexidine 0.12% Liquid 15 milliLiter(s) Oral Mucosa every 12 hours  dextrose 10%. 1000 milliLiter(s) (20 mL/Hr) IV Continuous <Continuous>  heparin Lock (1,000 Units/mL) Injectable 1 Dose(s) Catheter once  heparin Lock (1,000 Units/mL) Injectable 1 Dose(s) Catheter once  norepinephrine Infusion 0.95 MICROgram(s)/kG/Min (73.8 mL/Hr) IV Continuous <Continuous>  pantoprazole  Injectable 40 milliGRAM(s) IV Push every 12 hours  piperacillin/tazobactam IVPB.. 3.375 Gram(s) IV Intermittent every 12 hours  sodium bicarbonate  Infusion 0.181 mEq/kG/Hr (100 mL/Hr) IV Continuous <Continuous>  vancomycin  IVPB 1000 milliGRAM(s) IV Intermittent every 24 hours  vancomycin  IVPB        MEDICATIONS  (PRN):      PHYSICAL EXAM:  GENERAL: well built,+ETT, generalized edema  HEAD:  Atraumatic, Normocephalic  EYES:non reactive dilated pupil, no gag reflex,   ENT: No tonsillar erythema, exudates, or enlargement; Moist mucous membranes, Good dentition, No lesions  NECK: Supple, No JVD, Normal thyroid, no enlarged nodes  NERVOUS SYSTEM:   Unarousable despite physical stimulation, Pupils fixed and non responsive, no gag reflex , no response to painful stimuli    CHEST/LUNG: B/L good air entry; breast drain  HEART: +s1/s2 tachycardic  ABDOMEN: Soft, Nontender, Nondistended; Bowel sounds present  EXTREMITIES:  2+ Peripheral Pulses, No clubbing, cyanosis, or edema  LYMPH: No lymphadenopathy noted  SKIN: No rashes or lesions    Care Discussed with Consultants/Other Providers [ x] YES  [ ] NO

## 2021-02-21 NOTE — PROGRESS NOTE ADULT - ATTENDING COMMENTS
[facility] NEPHROLOGY  Marcos Rosas M.D.  Paawn Brambila D.O.  Nhung Arriaza M.D.  Aziza Costello, MSN, ANP-C    Telephone: [phone]  Facsimile: [phone]    [address] [place] [address]
[facility] NEPHROLOGY  Marcos Rosas M.D.  Pawan Brambila D.O.  Nhung Arriaza M.D.  Aziza Costello, MSN, ANP-C    Telephone: [phone]  Facsimile: [phone]    [address] [place] [address]
IMP: Patient admitted for hemorrhagic shock, cardiac arrest with CPR x 8 min found on floor in room. Down time? prior to CPR.  s/p  from bilateral mastectomy 2/17.  reexploration of b/l breat due to hematoma with bleeding vessel 2/19.  Seen in the ICU this morning, remains with poor neurologic status. Bilateral dilated pupils, no response to tactile or verbal stimulation, no gag or cough reflex noted. Labs suggestive of multisystem organ failure with acute liver dysfunction, acute renal failure, lactic acidosis.           Assessment:  1. Cardiac arrest  2. Hemorrhagic shock  3. Acute blood loss anemia  4. Thrombocytopenia   5. Acute renal failure  6. Acute liver dysfunction   7. Acute respiratory failure   8. Coagulopathy   9. Lactic acidosis with anion gap metabolic acidosis   10. Breast cancer s/p bilateral mastectomy  2/17  11. B/L breast hematoma re-exploration with bleeding vessel 2/19  12. MOSF  13. Rhabdomyolysis   14. Anoxic brain injury               Plan  - Cont. to monitor for signs of bleeding, bilateral wound explored and packed by surgery team  - H/H stable  - Monitor ALEJANDRO drain out put  - Transfuse to hgb > 8  - Monitor coags and platelets as well  - S/p transfusion of blood product:  16 U PRBC,  12 FFP, 3 PLT, Cryo 10  - Monitor respiratory status, may be getting volume overloaded at this time   - Cont. mechanical ventilation, obtain ABGs  - Monitor urine output   - Sodium bicarbonate infusion  - Nephrology discussed with Surgery .. will hold HD cath placement due to bleeding risk, re-evaluated 2/21  - pat is requiring 4 pressors to maintain SBP~80's  - Since pat is on 4 pressors david not tolerate HD  - Neph eval 2/20 noted   - Empiric broadening of antibiotic coverage given shock and multiple wound explorations  - Check electrolytes including calcium   - SCD for DVT prophylaxis  - Protonix for stress ulcer prophylaxis  - Prognosis is poor, given neurologic function and now with multiple system organ failure.    - Monitor Neuro status... pupil dilated and unreactive   - Neuro eval Anoxic encephalopathy vs Hemorrhage    - EEG consistent with anoxic brain injury   - Trend CPK  - Elevated lactate due to shock liver .   -I had phone and bedside conversation with  and orther relatives
IMP: Patient admitted for hemorrhagic shock, cardiac arrest with CPR x 8 min found on floor in room. Down time? prior to CPR.  s/p  from bilateral mastectomy 2/17.  reexploration of b/l breat due to hematoma with bleeding vessel 2/19.  Seen in the ICU this morning, remains with poor neurologic status. Bilateral dilated pupils, no response to tactile or verbal stimulation, no gag or cough reflex noted. Labs suggestive of multisystem organ failure with acute liver dysfunction, acute renal failure, lactic acidosis.           Assessment:  1. Cardiac arrest  2. Hemorrhagic shock  3. Acute blood loss anemia  4. Thrombocytopenia   5. Acute renal failure  6. Acute liver dysfunction   7. Acute respiratory failure   8. Coagulopathy   9. Lactic acidosis with anion gap metabolic acidosis   10. Breast cancer s/p bilateral mastectomy  2/17  11. B/L breast hematoma re-exploration with bleeding vessel 2/19  12. MOSF  13. Rhabdomyolysis   14. Anoxic brain injury               Plan  - Cont. to monitor for signs of bleeding, bilateral wound explored and packed by surgery team  - Monitor ALEJANDRO drain out put  - Transfuse to hgb > 8  - Monitor coags and platelets as well  - S/p transfusion of blood product:  16 U PRBC,  12 FFP, 3 PLT, Cryo 10  - Monitor respiratory status, may be getting volume overloaded at this time   - Cont. mechanical ventilation, obtain ABGs  - Monitor urine output   - Sodium bicarbonate infusion  - Nephrology discussed with Surgery .. will hold HD cath placement due to bleeding risk, re-evaluated 2/21  - Empiric broadening of antibiotic coverage given shock and multiple wound explorations  - Check electrolytes including calcium   - SCD for DVT prophylaxis  - Protonix for stress ulcer prophylaxis  - Prognosis is poor, given neurologic function and now with multiple system organ failure.    - Hematology eval  - Monitor Neuro status... pupil dilated and unreactive   - EEG consistent with anoxic brain injury   - Trend CPK  - Elevated lactate due to shock liver
Patient admitted overnight for hemorrhagic shock, cardiac arrest. Patient is POD#2 from bilateral mastectomy. Seen in the ICU this morning, remains with poor neurologic status. Bilateral dilated pupils, no response to tactile or verbal stimulation, no gag or cough reflex noted. Labs suggestive of multisystem organ failure with acute liver dysfunction, acute renal failure, lactic acidosis.     Labs repeated in the morning remains anemic, as Hgb went from 6.9 ->6.9 despite transfusions. Platelet count is stable at 169, INR downtrending to 1.59 now, with a normal PTT. Lactate level worsened, CPK and troponin worsening as well. Worsening creatinine as well.     Case discussed with Dr. Calles, initial plan for bed side exploration of the bilateral breasts. At time of evaluation noted with bleeding, as such code fusion was called due to hypotension and tachycardia. Wound packed by surgery team and decision made to take patient back to the OR for further wound exploration.      was updated about clinical condition prior to OR by myself and surgery attending. I explained that the prognosis is guarded at this time as patient with multiple organ failure and poor neurologic status. There is a high likelihood of further decompensation, including cardiac arrest and death. Patient's  emotionally overwhelmed and excused himself saying "I need more air." Offered chair to sit, but he expressed the desire to leave at this time.     Assessment:  1. Cardiac arrest  2. Hemorrhagic shock  3. Acute blood loss anemia  4. Thormbocytopenia   5. Acute renal failure  6. Acute liver dysfunction   7. Acute respiratory failure   8. Coagulopathy   9. Lactic acidosis with anion gap metabolic acidosis   10. Breast cancer s/p bilateral mastectomy     Plan  - Cont. to monitor for signs of bleeding, bilateral wound explored and packed by surgery team  - Monitor ALEJANDRO drain out put  - Transfuse to hgb > 8  - Monitor coags and platelets as well  - Monitor respiratory status, may be getting volume overloaded at this time   - Cont. mechanical ventilation, obtain ABGs  - Monitor urine output   - Sodium bicarbonate infusion  - Nephrology, hematology and cardiology consulted   - Empiric broadening of antibiotic coverage given shock and multiple wound explorations  - Check electrolytes including calcium   - SCD for DVT prophylaxis  - Protonix for stress ulcer prophylaxis  - Prognosis is poor, given neurologic function and now with multiple system organ failure.

## 2021-02-21 NOTE — PROGRESS NOTE ADULT - SUBJECTIVE AND OBJECTIVE BOX
[facility] NEPHROLOGY- PROGRESS NOTE    Patient is a 45y Female with breast cancer who presented to the hospital for bilateral mastectomy.  Following surgery, pt was found to have bleeding and cardiac arrest.  Pt has had two returns to the OR for evacuation of hematoma, effort to stop bleeding.  She has received many units of PRBCs, FFPs, and Hb had not been rising appropriately, though now seems to have risen and stabilized.  Since cardiac arrest, pt has has poor urine output and this is worsening.   She has a severe acidosis with elevated lactate levels.  Pt is on Abx (vancomycin/zosyn), norepinephrine, and sodium bicarbonate gtt.      Pt with high/rising INR today.  Plan was initially to try to place HD catheter and dialyze to optimize volume status, but given high INR/risk of bleeding, risks outweigh benefits at this time.  Pt was given FFPs to try to improve INR and prepare for possible HD catheter placement tomorrow.    REVIEW OF SYSTEMS: unable to obtain.       ICU Vital Signs Last 24 Hrs  T(C): 35.2 (21 Feb 2021 12:00), Max: 36 (20 Feb 2021 22:15)  T(F): 95.4 (21 Feb 2021 12:00), Max: 96.8 (20 Feb 2021 22:15)  HR: 122 (21 Feb 2021 16:45) (118 - 131)  BP: 85/45 (21 Feb 2021 14:00) (83/49 - 122/66)  BP(mean): 54 (21 Feb 2021 14:00) (52 - 78)  ABP: 75/40 (21 Feb 2021 16:45) (61/33 - 125/60)  ABP(mean): 50 (21 Feb 2021 16:45) (42 - 88)  RR: 30 (21 Feb 2021 16:45) (29 - 30)  SpO2: 100% (21 Feb 2021 16:45) (62% - 100%)    02-20 @ 07:01  -  02-21 @ 07:00  --------------------------------------------------------  IN: 8353.7 mL / OUT: 1145 mL / NET: 7208.7 mL    02-21 @ 07:01  -  02-21 @ 17:02  --------------------------------------------------------  IN: 6666 mL / OUT: 0 mL / NET: 6666 mL          PHYSICAL EXAM:  Constitutional: intubated, not sedated, but nonresponsive.  HEENT: +facial edema, anicteric sclera, +ETT  Respiratory: mechanical BS B with some wheezing on L anteriorly  Cardiovascular: S1, S2, RRR  Gastrointestinal: BS+, soft, ND  Extremities: No cyanosis or clubbing. +Anasarca  Neurological: nonresponsive despite absence of sedation  : + chandler, 50ml yellow urine with some more urine coming out, bladder not palpable.   Skin: No rashes    LABS:  02-21    145  |  108  |  20<H>  ----------------------------<  289<H>  4.0   |  7<LL>  |  3.88<H>    Ca    5.7<LL>      21 Feb 2021 15:46  Phos  5.9     02-21  Mg     1.3     02-21    TPro  2.8<L>  /  Alb  1.2<L>  /  TBili  4.5<H>  /  DBili      /  AST  2671<H>  /  ALT  1122<H>  /  AlkPhos  203<H>  02-21    Creatinine Trend: 3.88 <--, 3.98 <--, 4.18 <--, 4.11 <--, 3.92 <--, 3.71 <--, 3.28 <--, 2.91 <--, 2.50 <--, 2.62 <--, 2.71 <--, 2.06 <--, 1.77 <--, 1.83 <--, 1.74 <--, 1.02 <--                        10.0   19.66 )-----------( 41       ( 21 Feb 2021 15:46 )             30.0   Fibrinogen Assay (02.21.21 @ 15:46)    Fibrinogen Assay: 252 mg/dL  Fibrin Split Products (02.21.21 @ 15:46)    Fibrin Split Products: >=20: The fibrin/fibrinogen degradation product manual agglutination assay is  not adequately sensitive for evaluation of deep vein thrombosis and/or  pulmonary embolism.  D-Dimer Assay, Quantitative (02.21.21 @ 15:46)    D-Dimer Assay, Quantitative: 4497 ng/mL DDU    PT/INR - ( 21 Feb 2021 15:46 )   PT: 26.6 sec;   INR: 2.33 ratio    PTT - ( 21 Feb 2021 15:46 )  PTT:43.7 sec          Blood Gas Profile - Arterial (02.21.21 @ 16:40)    pH, Arterial: 7.08: TYPE:(C=Critical, N=Notification, A=Abnormal) C  TESTS: _pH 7.08  DATE/TIME CALLED: _02/21/2021 16:44:48 EST  CALLED TO: JABIER Lorenzana MD  READ BACK (2 Patient Identifiers)(Y/N): _y  READ BACK VALUES (Y/N): _y  CALLED BY: _Isabelle RRT    pCO2, Arterial: 22 mmHg    pO2, Arterial: 188 mmHg    HCO3, Arterial: 6 mmol/L    Base Excess, Arterial: -22.5 mmol/L    Oxygen Saturation, Arterial: 98 %    FIO2, Arterial: 100    Blood Gas Comments Arterial: AC/VC-500-100%-30-+8,A line        Lactate, Blood (02.21.21 @ 15:46)    Lactate, Blood: 20.5 mmol/L  Lactate, Blood (02.21.21 @ 09:27)    Lactate, Blood: 18.2 mmol/L  Lactate, Blood (02.20.21 @ 20:19)    Lactate, Blood: 17.4: TYPE:(C=Critical, N=Notification, A=Abnormal) C  Lactate, Blood (02.20.21 @ 14:15)    Lactate, Blood: 18.9: TYPE:(C=Critical, N=Notification, A=Abnormal) C  Lactate, Blood (02.19.21 @ 17:04)    Lactate, Blood: 13.4: TYPE:(C=Critical, N=Notification, A=Abnormal) _  Lactate, Blood (02.19.21 @ 11:46)    Lactate, Blood: 17.0: TYPE:(C=Critical, N=Notification, A=Abnormal)          < from: Xray Chest 1 View- PORTABLE-Routine (Xray Chest 1 View- PORTABLE-Routine in AM.) (02.21.21 @ 10:01) >    EXAM:  XR CHEST PORTABLE ROUTINE 1V                            PROCEDURE DATE:  02/21/2021          INTERPRETATION:  Clinical Information: Cardiac arrest.    Technique: AP chest image.    Comparison: 02/20/2021    Findings/  Impression: ETT tip above milton. NG tip below diaphragm. Right IJ catheter tip at the SVC. Left infusion port catheter tip at the SVC. Status post chest surgery.    Small bilateral pleural effusions and moderate pulmonary edema.            PAUL JACOBSON MD; Attending Interventional Radiologist  This document has been electronically signed. Feb 21 2021  1:24PM    < end of copied text >

## 2021-02-21 NOTE — PROGRESS NOTE ADULT - ASSESSMENT
46 yo female with hx of HTN, breast CA (dx in 7/2020) s/p chemo (completed in 10/2020), DVt  LIJ(8/2020 on eliquis)  was admitted to Surgical unit for elective for  right breast total mastectomy with preop needle localization and right breast sentinel node bx, left breast prophylactic total mastectomy with left breast preop needle localization on 2/17/2021. Post OR pt was RRT on 2/17 night for profuse diaphoresis and lightheadedness and hypotension. Pt was given IVF. On 2/18 her Hb in am dropped to 8 from 11 and pt was transfused 2 RBCs .Around 19:45 pt was RRT as pt is found unresponsive and  then went pulseless. Code blue was called and CPR was initiated. R femoral CVC was placed. ROSC was achieved after 8 mins( 3 rounds of Epi+ sod bicarb x1, D50 x 1, rhythm was asystole) . Pt was profusely bleeding in the ALEJANDRO drains( 500 cc in total from 7am-7pm shift).  CBC returned with hemoglobin 5.1. Decision was therefore made to take the patient to the operating room to pursue active bleeding. Patient taken to OR, the left mastectomy site was opened. Two small pumping vessels were found and ligated. Patient appeared to be in DIC and had blood oozing from bare surfaces including around her central line. The surgical site was promptly closed after confirmation of no continued bleeding. She was taken to ICU for continued resuscitation.     Pt admitted to ICU post cardiac  Arrest and Hemorrhagic shock and Acute renal failure         Assessment:  1. Cardiac Arrest     2. Hemorrhagic Shock   3. Acute Renal failure   4. Lactic acidosis   5. Shock liver   6. Breast Ca  b/l Mastectomy  7. DVT LIJ   8. HTN      Plan:    =====================[CNS ]==============================  # Acute Encephalopathy  :   - unresponsive s/p cardiac arrest , currently not triggering vent, no corneal reflexes   - Monitor mental status off sedation   - will get CTH to assess the brain injury once pt is more stable   - Neuro, Dr. Angel consulted    =====================[CVS ]===============================  # Cardiac Arrest :   - ROSC achieved after 8 mins   - no hypothermia protocol due to active hemorrhage  - holding off Aspirin due to bleeding   - Trend trop   - f/u Echo   - cardio consult Dr Maher- cardiac arrest likely due to hemorrhagic shock    # Hemorrhagic Shock :   - s/p b/l mastectomy 2/17 , 4 ALEJANDRO drains in place , oozing blood   - Hemoglobin remains depressed despite multiple units of prbc, plasma, platelet.  - monitor CBC q4 for now   - monitor ALEJANDRO drain discharge   - c/w vasopressor for BP support   - Hold BP medications    =====================[RESP ]==============================  # Acute Hypoxic Resp Failure :   - Intubated during cardiac arrest   - c/w mech vent   - AGMA due to lactate acidosis  - f/u ABG and adjust vent setting     =====================[ GI ]================================  # Shock Liver :   - due to hypovolemic shock   - LFts trending up    - NPO  for now   - Correct underlying hypotension    ====================[ RENAL ]=============================   # Acute Renal failure :   - likely prerenal given hypotension   - metabolic acidosis s/p bicarb drip. Bicarb drip discontinued 2/20 as acidosis resolved  - Monitor Ph, BMP q4 for now   - monitor urine output   - Monitor electrolytes   - HD on hold for bleeding risk  NephroDr. Rosas consulted    # Lactic Acidosis :   - lactate continues to be elevated  - likely 2/2 shock   -cont to trend     =====================[ ID ]================================  # B/l Mastectomy :   - started on ancef as per surgery recc     ===================[ HEME/ONC ]===========================  # Anemia  :   -  Due to bleeding vessels in the breast  - Patient returning to OR for control of bleeding  - hold AC and aspirin    # Thrombocytopenia   - monitor and transfuse plts as needed     # DIC :   - fibrinogen 107   - s/p transfusion 10 U of cryo  - Heme/onc QMA, Dr. Blancas consulted     # Breast ca s/p b/l Mastectomy :   - s/p Left mastectomy washout POD1  - B/L masectomy POD2   - monitor ALEJANDRO drain discharge   - c/w dressing   - Monitor cbc and transfuse as needed   - c/w Prophylactic ancef   - Surgery following Dr Calles     =====================[ ENDO ]=============================  # No history of DM  - target CBG < 180  - FS q4 while NPO  - Start diet when possible    ==================[ PROPHYLAXIS ]==========================   # Dvt :SCD , holding AC due to hemorrhagic shock   # Gi : Protonix     ====================[ DISPO ]==============================   - Monitor in ICU     ===================[ PROGNOSIS ]===========================  - Guarded      44 yo female with hx of HTN, breast CA (dx in 7/2020) s/p chemo (completed in 10/2020), DVt  [facility]J(8/2020 on eliquis)  was admitted to Surgical unit for elective for  right breast total mastectomy with preop needle localization and right breast sentinel node bx, left breast prophylactic total mastectomy with left breast preop needle localization on 2/17/2021. Post OR pt was RRT on 2/17 night for profuse diaphoresis and lightheadedness and hypotension. Pt was given IVF. On 2/18 her Hb in am dropped to 8 from 11 and pt was transfused 2 RBCs .Around 19:45 pt was RRT as pt is found unresponsive and  then went pulseless. Code blue was called and CPR was initiated. R femoral CVC was placed. ROSC was achieved after 8 mins( 3 rounds of Epi+ sod bicarb x1, D50 x 1, rhythm was asystole) . Pt was profusely bleeding in the ALEJANDRO drains( 500 cc in total from 7am-7pm shift).  CBC returned with hemoglobin 5.1. Decision was therefore made to take the patient to the operating room to pursue active bleeding. Patient taken to OR, the left mastectomy site was opened. Two small pumping vessels were found and ligated. Patient appeared to be in DIC and had blood oozing from bare surfaces including around her central line. The surgical site was promptly closed after confirmation of no continued bleeding. She was taken to ICU for continued resuscitation.     Pt admitted to ICU post cardiac  Arrest and Hemorrhagic shock and Acute renal failure         Assessment:  1. Cardiac Arrest     2. Hemorrhagic Shock   3. Acute Renal failure   4. Lactic acidosis   5. Shock liver   6. Breast Ca  b/l Mastectomy  7. DVT LIJ   8. HTN      Plan:    =====================[CNS ]==============================  # Acute Encephalopathy  :   - unresponsive s/p cardiac arrest , currently not triggering vent, no corneal reflexes   - Monitor mental status off sedation- patient has no mental status despite being off all sedatives   - will get CTH to assess the brain injury once pt is more stable   - Neuro, Dr. Angel consulted    =====================[CVS ]===============================  # Cardiac Arrest :   - ROSC achieved after 8 mins   - no hypothermia protocol due to active hemorrhage  - holding off Aspirin due to bleeding   - Trend trop   - f/u Echo   - cardio consult Dr Maher- cardiac arrest likely due to hemorrhagic shock  - Now maxed on 4 pressiors- pheny, levo, vasopressin and epinephrine and blood pressure continues to decline    # Hemorrhagic Shock :   - s/p b/l mastectomy 2/17 , 4 ALEJANDRO drains in place   - Hemoglobin appears stable will continue to trend q4h  - monitor ALEJANDRO drain discharge   - c/w vasopressin, levo, pheny, epi for BP support   - Hold BP medications    =====================[RESP ]==============================  # Acute Hypoxic Resp Failure :   - Intubated during cardiac arrest   - c/w [facility] vent   - AGMA due to lactate acidosis  - f/u ABG and adjust vent setting- ABG indicative of metabolic acidosis likely due to lactate acidosis     =====================[ GI ]================================  # Shock Liver :   - due to hypovolemic shock   - LFts trending up    - NPO  for now   - Correct underlying hypotension    ====================[ RENAL ]=============================   # Acute Renal failure :   - likely prerenal given hypotension   - metabolic acidosis s/p bicarb drip. Bicarb drip discontinued 2/20 as acidosis resolved  - Monitor Ph, BMP q4 for now   - monitor urine output   - Monitor electrolytes   - HD on hold for bleeding risk  NephroDr. Rosas consulted  Low urine output, Lasix did not work. Not a candidate for dialysis given high bleeding risk    # Lactic Acidosis :   - lactate continues to be elevated  - likely 2/2 shock   -cont to trend     =====================[ ID ]================================  # B/l Mastectomy :   - started on ancef as per surgery recc   Changed to zosyn and vancomycin  - Vanc trough prior to every vanc dosing    ===================[ HEME/ONC ]===========================  # Anemia  :   -  Due to bleeding vessels in the breast  - Patient returning to OR for control of bleeding x2  Hgb appears stablized  - hold AC and aspirin    # Thrombocytopenia   - monitor and transfuse plts as needed     # DIC :   Unlikely DIC given normal fibrinogen  - Heme/onc QMA, Dr. Blancas consulted     # Breast ca s/p b/l Mastectomy :   - s/p Left mastectomy washout  - B/L masectomy  - monitor ALEJANDRO drain discharge   - c/w dressing   - Monitor cbc and transfuse as needed   - Continue vanc and zosyn  - Surgery following Dr Calles     =====================[ ENDO ]=============================  # No history of DM  - target CBG < 180  - FS q4 while NPO  - Start diet when possible    ==================[ PROPHYLAXIS ]==========================   # Dvt :SCD , holding AC due to hemorrhagic shock   # Gi : Protonix     ====================[ DISPO ]==============================   - Monitor in ICU     ===================[ PROGNOSIS ]===========================  - Guarded

## 2021-02-21 NOTE — PROGRESS NOTE ADULT - ASSESSMENT
45 F s/p b/l mastectomy, POD 4, with post operative course complicated by cardiac arrest, intubation, poor neurological response, DIC, and b/l breast hematomas, POD1 from b/l breast washout and packing placement. Patient is maxed out on vasopressor support with BP in the 60's systolic. Prognosis remains guarded and she remains in critical condition:   - Family discussion had at the bedside about patient's condition  - Surgical dressings to remain in place  - ALEJANDRO drains to continuous wall suction  - Continue critical care by ICU team

## 2021-02-21 NOTE — CHART NOTE - NSCHARTNOTEFT_GEN_A_CORE
Pt is maxed pout on pressors and SBP is in 60s. Dr Brewster called  Mr Cortes and informed about the medical status, about grave prognosis and will likely code again. All questions answered.

## 2021-02-21 NOTE — PROGRESS NOTE ADULT - SUBJECTIVE AND OBJECTIVE BOX
Patient on maximal vasopressors with BP only 66.  Drainage outputs about 1200 cc in last 24 hours - still somewhat sanguinous.  Hct 34%.  No neurological improvement noted.    Dressings bilaterally remain intact and not soaked.    Lab results noted.    Patient is in multisystem organ failure.  Prognosis remains grave.    Discussed above with .

## 2021-02-21 NOTE — CHART NOTE - NSCHARTNOTEFT_GEN_A_CORE
Patient BP was low overnight  , patient was on levophyd . pheny, vaso and epinephrine drip were added. patient  was contacted and updated about the current situation and wants the patient to continue as Full code

## 2021-02-21 NOTE — PROGRESS NOTE ADULT - SUBJECTIVE AND OBJECTIVE BOX
Patient is a 45y old  Female who presents with a chief complaint of Cardiac Arrest (19 Feb 2021 13:45)       Pt is seen and examined  pt is awake and lying in bed  intubated sedated            ROS:  Negative except for:    MEDICATIONS  (STANDING):  albumin human 25% IVPB 50 milliLiter(s) IV Intermittent every 8 hours  chlorhexidine 0.12% Liquid 15 milliLiter(s) Oral Mucosa every 12 hours  dextrose 10%. 1000 milliLiter(s) (40 mL/Hr) IV Continuous <Continuous>  EPINEPHrine    Infusion 2 MICROgram(s)/kG/Min (311 mL/Hr) IV Continuous <Continuous>  heparin Lock (1,000 Units/mL) Injectable 1 Dose(s) Catheter once  heparin Lock (1,000 Units/mL) Injectable 1 Dose(s) Catheter once  norepinephrine Infusion 5 MICROgram(s)/kG/Min (389 mL/Hr) IV Continuous <Continuous>  pantoprazole  Injectable 40 milliGRAM(s) IV Push every 12 hours  phenylephrine    Infusion 10 MICROgram(s)/kG/Min (155 mL/Hr) IV Continuous <Continuous>  piperacillin/tazobactam IVPB.. 3.375 Gram(s) IV Intermittent every 12 hours  sodium bicarbonate  Infusion 0.181 mEq/kG/Hr (100 mL/Hr) IV Continuous <Continuous>  vancomycin  IVPB 1000 milliGRAM(s) IV Intermittent every 24 hours  vancomycin  IVPB      vasopressin Infusion 0.04 Unit(s)/Min (2.4 mL/Hr) IV Continuous <Continuous>    MEDICATIONS  (PRN):      Allergies    No Known Allergies    Intolerances        Vital Signs Last 24 Hrs  T(C): 35.2 (21 Feb 2021 12:00), Max: 36 (20 Feb 2021 22:15)  T(F): 95.4 (21 Feb 2021 12:00), Max: 96.8 (20 Feb 2021 22:15)  HR: 122 (21 Feb 2021 18:45) (118 - 131)  BP: 85/45 (21 Feb 2021 14:00) (83/49 - 122/66)  BP(mean): 54 (21 Feb 2021 14:00) (52 - 78)  RR: 30 (21 Feb 2021 18:45) (29 - 30)  SpO2: 100% (21 Feb 2021 18:45) (62% - 100%)    PHYSICAL EXAM  General: adult in NAD  HEENT: clear oropharynx, anicteric sclera, pink conjunctiva  Neck: supple  CV: normal S1/S2 with no murmur rubs or gallops  Lungs: positive air movement b/l ant lungs,clear to auscultation, no wheezes, no rales  Abdomen: soft non-tender non-distended, no hepatosplenomegaly  Ext: no clubbing cyanosis or edema  Skin: no rashes and no petechiae  Neuro: alert and oriented X 4, no focal deficits  LABS:                          10.0   19.66 )-----------( 41       ( 21 Feb 2021 15:46 )             30.0         Mean Cell Volume : 88.8 fl  Mean Cell Hemoglobin : 29.6 pg  Mean Cell Hemoglobin Concentration : 33.3 gm/dL  Auto Neutrophil # : 17.56 K/uL  Auto Lymphocyte # : 1.25 K/uL  Auto Monocyte # : 0.49 K/uL  Auto Eosinophil # : 0.12 K/uL  Auto Basophil # : 0.02 K/uL  Auto Neutrophil % : 89.3 %  Auto Lymphocyte % : 6.4 %  Auto Monocyte % : 2.5 %  Auto Eosinophil % : 0.6 %  Auto Basophil % : 0.1 %    Serial CBC's  02-21 @ 15:46  Hct-30.0 / Hgb-10.0 / Plat-41 / RBC-3.38 / WBC-19.66          Serial CBC's  02-21 @ 09:27  Hct-32.0 / Hgb-10.6 / Plat-51 / RBC-3.65 / WBC-22.17          Serial CBC's  02-21 @ 05:01  Hct-34.1 / Hgb-11.8 / Plat-62 / RBC-3.93 / WBC-23.66          Serial CBC's  02-20 @ 23:29  Hct-37.3 / Hgb-12.7 / Plat-72 / RBC-4.37 / WBC-20.00          Serial CBC's  02-20 @ 20:19  Hct-37.3 / Hgb-12.9 / Plat-72 / RBC-4.29 / WBC-17.04            02-21    145  |  108  |  20<H>  ----------------------------<  289<H>  4.0   |  7<LL>  |  3.88<H>    Ca    5.7<LL>      21 Feb 2021 15:46  Phos  5.9     02-21  Mg     1.3     02-21    TPro  2.8<L>  /  Alb  1.2<L>  /  TBili  4.5<H>  /  DBili  x   /  AST  2671<H>  /  ALT  1122<H>  /  AlkPhos  203<H>  02-21      PT/INR - ( 21 Feb 2021 15:46 )   PT: 26.6 sec;   INR: 2.33 ratio         PTT - ( 21 Feb 2021 15:46 )  PTT:43.7 sec    WBC Count: 19.66 K/uL (02-21-21 @ 15:46)  Hemoglobin: 10.0 g/dL (02-21-21 @ 15:46)  Hematocrit: 30.0 % (02-21-21 @ 15:46)  Platelet Count - Automated: 41 K/uL (02-21-21 @ 15:46)  Hemoglobin: 10.6 g/dL (02-21-21 @ 09:27)  Hematocrit: 32.0 % (02-21-21 @ 09:27)  Platelet Count - Automated: 51 K/uL (02-21-21 @ 09:27)  WBC Count: 22.17 K/uL (02-21-21 @ 09:27)  WBC Count: 23.66 K/uL (02-21-21 @ 05:01)  Hemoglobin: 11.8 g/dL (02-21-21 @ 05:01)  Hematocrit: 34.1 % (02-21-21 @ 05:01)  Platelet Count - Automated: 62 K/uL (02-21-21 @ 05:01)  Hemoglobin: 12.7 g/dL (02-20-21 @ 23:29)  Hematocrit: 37.3 % (02-20-21 @ 23:29)  Platelet Count - Automated: 72 K/uL (02-20-21 @ 23:29)  WBC Count: 20.00 K/uL (02-20-21 @ 23:29)  Hemoglobin: 12.9 g/dL (02-20-21 @ 20:19)  Hematocrit: 37.3 % (02-20-21 @ 20:19)  Platelet Count - Automated: 72 K/uL (02-20-21 @ 20:19)  WBC Count: 17.04 K/uL (02-20-21 @ 20:19)  WBC Count: 11.31 K/uL (02-20-21 @ 14:15)  Hemoglobin: 12.6 g/dL (02-20-21 @ 14:15)  Hematocrit: 37.0 % (02-20-21 @ 14:15)  Platelet Count - Automated: 75 K/uL (02-20-21 @ 14:15)  WBC Count: 4.06 K/uL (02-20-21 @ 08:14)  Hemoglobin: 15.3 g/dL (02-20-21 @ 08:14)  Hematocrit: 43.7 % (02-20-21 @ 08:14)  Platelet Count - Automated: 96 K/uL (02-20-21 @ 08:14)  WBC Count: 3.73 K/uL (02-20-21 @ 03:28)  Hemoglobin: 14.9 g/dL (02-20-21 @ 03:28)  Hematocrit: 42.8 % (02-20-21 @ 03:28)  Platelet Count - Automated: 102 K/uL (02-20-21 @ 03:28)  Ferritin, Serum: 1621 ng/mL (02-20-21 @ 03:02)  WBC Count: 3.94 K/uL (02-19-21 @ 21:11)  Hemoglobin: 14.0 g/dL (02-19-21 @ 21:11)  Hematocrit: 42.4 % (02-19-21 @ 21:11)  Platelet Count - Automated: 117 K/uL (02-19-21 @ 21:11)  WBC Count: 8.42 K/uL (02-19-21 @ 17:04)  Hemoglobin: 12.5 g/dL (02-19-21 @ 17:04)  Hematocrit: 39.5 % (02-19-21 @ 17:04)  Platelet Count - Automated: 125 K/uL (02-19-21 @ 17:04)  WBC Count: 8.89 K/uL (02-19-21 @ 15:35)  Hemoglobin: 7.7 g/dL (02-19-21 @ 15:35)  Hematocrit: 24.2 % (02-19-21 @ 15:35)  Platelet Count - Automated: 64 K/uL (02-19-21 @ 15:35)  WBC Count: 8.09 K/uL (02-19-21 @ 11:46)  Hemoglobin: 6.9 g/dL (02-19-21 @ 11:46)  Hematocrit: 20.8 % (02-19-21 @ 11:46)  Platelet Count - Automated: 189 K/uL (02-19-21 @ 11:46)  WBC Count: 7.74 K/uL (02-19-21 @ 03:37)  Hemoglobin: 6.9 g/dL (02-19-21 @ 03:37)  Hematocrit: 21.7 % (02-19-21 @ 03:37)  Platelet Count - Automated: 171 K/uL (02-19-21 @ 03:37)  WBC Count: 9.79 K/uL (02-19-21 @ 00:03)  Hemoglobin: 7.9 g/dL (02-19-21 @ 00:03)  Hematocrit: 25.8 % (02-19-21 @ 00:03)  Platelet Count - Automated: 142 K/uL (02-19-21 @ 00:03)  WBC Count: 10.21 K/uL (02-18-21 @ 22:41)  Hemoglobin: 7.4 g/dL (02-18-21 @ 22:41)  Hematocrit: 23.7 % (02-18-21 @ 22:41)  Platelet Count - Automated: 129 K/uL (02-18-21 @ 22:41)  WBC Count: 19.22 K/uL (02-18-21 @ 21:32)  Hemoglobin: 6.6 g/dL (02-18-21 @ 21:32)  Hematocrit: 21.4 % (02-18-21 @ 21:32)  Platelet Count - Automated: 76 K/uL (02-18-21 @ 21:32)  WBC Count: 10.28 K/uL (02-18-21 @ 20:15)  Hemoglobin: 5.1 g/dL (02-18-21 @ 20:15)  Hematocrit: 16.8 % (02-18-21 @ 20:15)  Platelet Count - Automated: 98 K/uL (02-18-21 @ 20:15)  WBC Count: 23.21 K/uL (02-18-21 @ 15:42)  Hemoglobin: 9.1 g/dL (02-18-21 @ 15:42)  Hematocrit: 27.6 % (02-18-21 @ 15:42)  Platelet Count - Automated: 137 K/uL (02-18-21 @ 15:42)  WBC Count: 21.90 K/uL (02-18-21 @ 10:29)  Hemoglobin: 8.0 g/dL (02-18-21 @ 10:29)  Hematocrit: 23.9 % (02-18-21 @ 10:29)  Platelet Count - Automated: 151 K/uL (02-18-21 @ 10:29)  WBC Count: 13.42 K/uL (02-18-21 @ 03:55)  Hemoglobin: 8.0 g/dL (02-18-21 @ 03:55)  Hematocrit: 24.6 % (02-18-21 @ 03:55)  Platelet Count - Automated: 190 K/uL (02-18-21 @ 03:55)                BLOOD SMEAR INTERPRETATION:       RADIOLOGY & ADDITIONAL STUDIES:

## 2021-02-21 NOTE — PROGRESS NOTE ADULT - PROVIDER SPECIALTY LIST ADULT
Critical Care
Critical Care
Surgery
Anesthesia
Surgery
Heme/Onc
MICU
Nephrology
Nephrology
Surgery

## 2021-02-22 VITALS — OXYGEN SATURATION: 82 % | RESPIRATION RATE: 19 BRPM | HEART RATE: 49 BPM

## 2021-02-22 LAB
ALBUMIN SERPL ELPH-MCNC: 1.4 G/DL — LOW (ref 3.5–5)
ALP SERPL-CCNC: 227 U/L — HIGH (ref 40–120)
ALT FLD-CCNC: 881 U/L DA — HIGH (ref 10–60)
ANION GAP SERPL CALC-SCNC: 28 MMOL/L — HIGH (ref 5–17)
APTT BLD: 49 SEC — HIGH (ref 27.5–35.5)
AST SERPL-CCNC: 2087 U/L — HIGH (ref 10–40)
BASE EXCESS BLDA CALC-SCNC: -21.6 MMOL/L — LOW (ref -2–2)
BASOPHILS # BLD AUTO: SIGNIFICANT CHANGE UP K/UL (ref 0–0.2)
BASOPHILS NFR BLD AUTO: SIGNIFICANT CHANGE UP % (ref 0–2)
BILIRUB SERPL-MCNC: 4.7 MG/DL — HIGH (ref 0.2–1.2)
BLOOD GAS COMMENTS ARTERIAL: SIGNIFICANT CHANGE UP
BUN SERPL-MCNC: 17 MG/DL — SIGNIFICANT CHANGE UP (ref 7–18)
CALCIUM SERPL-MCNC: 5.7 MG/DL — CRITICAL LOW (ref 8.4–10.5)
CHLORIDE SERPL-SCNC: 109 MMOL/L — HIGH (ref 96–108)
CO2 SERPL-SCNC: 9 MMOL/L — CRITICAL LOW (ref 22–31)
CREAT SERPL-MCNC: 3.84 MG/DL — HIGH (ref 0.5–1.3)
D DIMER BLD IA.RAPID-MCNC: 3474 NG/ML DDU — HIGH
EOSINOPHIL # BLD AUTO: SIGNIFICANT CHANGE UP K/UL (ref 0–0.5)
EOSINOPHIL NFR BLD AUTO: SIGNIFICANT CHANGE UP % (ref 0–6)
FIBRINOGEN PPP-MCNC: 279 MG/DL — LOW (ref 290–520)
GLUCOSE BLDC GLUCOMTR-MCNC: 116 MG/DL — HIGH (ref 70–99)
GLUCOSE BLDC GLUCOMTR-MCNC: 143 MG/DL — HIGH (ref 70–99)
GLUCOSE SERPL-MCNC: 112 MG/DL — HIGH (ref 70–99)
HCO3 BLDA-SCNC: 7 MMOL/L — LOW (ref 23–27)
HCT VFR BLD CALC: 29.5 % — LOW (ref 34.5–45)
HGB BLD-MCNC: 9.9 G/DL — LOW (ref 11.5–15.5)
HOROWITZ INDEX BLDA+IHG-RTO: 100 — SIGNIFICANT CHANGE UP
IMM GRANULOCYTES NFR BLD AUTO: SIGNIFICANT CHANGE UP % (ref 0–1.5)
INR BLD: 2.23 RATIO — HIGH (ref 0.88–1.16)
LACTATE SERPL-SCNC: 17.9 MMOL/L — CRITICAL HIGH (ref 0.7–2)
LYMPHOCYTES # BLD AUTO: SIGNIFICANT CHANGE UP % (ref 13–44)
LYMPHOCYTES # BLD AUTO: SIGNIFICANT CHANGE UP K/UL (ref 1–3.3)
MAGNESIUM SERPL-MCNC: 1 MG/DL — CRITICAL LOW (ref 1.6–2.6)
MCHC RBC-ENTMCNC: 29.4 PG — SIGNIFICANT CHANGE UP (ref 27–34)
MCHC RBC-ENTMCNC: 33.6 GM/DL — SIGNIFICANT CHANGE UP (ref 32–36)
MCV RBC AUTO: 87.5 FL — SIGNIFICANT CHANGE UP (ref 80–100)
MONOCYTES # BLD AUTO: SIGNIFICANT CHANGE UP K/UL (ref 0–0.9)
MONOCYTES NFR BLD AUTO: SIGNIFICANT CHANGE UP % (ref 2–14)
NEUTROPHILS # BLD AUTO: SIGNIFICANT CHANGE UP K/UL (ref 1.8–7.4)
NEUTROPHILS NFR BLD AUTO: SIGNIFICANT CHANGE UP % (ref 43–77)
NRBC # BLD: 0 /100 WBCS — SIGNIFICANT CHANGE UP (ref 0–0)
PCO2 BLDA: 29 MMHG — LOW (ref 32–46)
PH BLDA: 7.04 — CRITICAL LOW (ref 7.35–7.45)
PHOSPHATE SERPL-MCNC: 4.8 MG/DL — HIGH (ref 2.5–4.5)
PLATELET # BLD AUTO: 32 K/UL — LOW (ref 150–400)
PO2 BLDA: 52 MMHG — LOW (ref 74–108)
POTASSIUM SERPL-MCNC: 3.3 MMOL/L — LOW (ref 3.5–5.3)
POTASSIUM SERPL-SCNC: 3.3 MMOL/L — LOW (ref 3.5–5.3)
PROT SERPL-MCNC: 3 G/DL — LOW (ref 6–8.3)
PROTHROM AB SERPL-ACNC: 25.5 SEC — HIGH (ref 10.6–13.6)
RBC # BLD: 3.37 M/UL — LOW (ref 3.8–5.2)
RBC # FLD: 18.1 % — HIGH (ref 10.3–14.5)
SAO2 % BLDA: 81 % — LOW (ref 92–96)
SODIUM SERPL-SCNC: 146 MMOL/L — HIGH (ref 135–145)
WBC # BLD: 16.55 K/UL — HIGH (ref 3.8–10.5)
WBC # FLD AUTO: 16.55 K/UL — HIGH (ref 3.8–10.5)

## 2021-02-22 PROCEDURE — 88331 PATH CONSLTJ SURG 1 BLK 1SPC: CPT

## 2021-02-22 PROCEDURE — 82306 VITAMIN D 25 HYDROXY: CPT

## 2021-02-22 PROCEDURE — P9040: CPT

## 2021-02-22 PROCEDURE — 85362 FIBRIN DEGRADATION PRODUCTS: CPT

## 2021-02-22 PROCEDURE — 86900 BLOOD TYPING SEROLOGIC ABO: CPT

## 2021-02-22 PROCEDURE — 86923 COMPATIBILITY TEST ELECTRIC: CPT

## 2021-02-22 PROCEDURE — 84100 ASSAY OF PHOSPHORUS: CPT

## 2021-02-22 PROCEDURE — 82310 ASSAY OF CALCIUM: CPT

## 2021-02-22 PROCEDURE — 36430 TRANSFUSION BLD/BLD COMPNT: CPT

## 2021-02-22 PROCEDURE — C1889: CPT

## 2021-02-22 PROCEDURE — 88307 TISSUE EXAM BY PATHOLOGIST: CPT

## 2021-02-22 PROCEDURE — 95819 EEG AWAKE AND ASLEEP: CPT

## 2021-02-22 PROCEDURE — 86901 BLOOD TYPING SEROLOGIC RH(D): CPT

## 2021-02-22 PROCEDURE — 84484 ASSAY OF TROPONIN QUANT: CPT

## 2021-02-22 PROCEDURE — 85379 FIBRIN DEGRADATION QUANT: CPT

## 2021-02-22 PROCEDURE — 88342 IMHCHEM/IMCYTCHM 1ST ANTB: CPT

## 2021-02-22 PROCEDURE — 93005 ELECTROCARDIOGRAM TRACING: CPT

## 2021-02-22 PROCEDURE — 83605 ASSAY OF LACTIC ACID: CPT

## 2021-02-22 PROCEDURE — 78195 LYMPH SYSTEM IMAGING: CPT

## 2021-02-22 PROCEDURE — 82553 CREATINE MB FRACTION: CPT

## 2021-02-22 PROCEDURE — 83735 ASSAY OF MAGNESIUM: CPT

## 2021-02-22 PROCEDURE — 88341 IMHCHEM/IMCYTCHM EA ADD ANTB: CPT

## 2021-02-22 PROCEDURE — 95957 EEG DIGITAL ANALYSIS: CPT

## 2021-02-22 PROCEDURE — P9012: CPT

## 2021-02-22 PROCEDURE — 85730 THROMBOPLASTIN TIME PARTIAL: CPT

## 2021-02-22 PROCEDURE — 85025 COMPLETE CBC W/AUTO DIFF WBC: CPT

## 2021-02-22 PROCEDURE — 82330 ASSAY OF CALCIUM: CPT

## 2021-02-22 PROCEDURE — 85610 PROTHROMBIN TIME: CPT

## 2021-02-22 PROCEDURE — 80053 COMPREHEN METABOLIC PANEL: CPT

## 2021-02-22 PROCEDURE — 80048 BASIC METABOLIC PNL TOTAL CA: CPT

## 2021-02-22 PROCEDURE — 84550 ASSAY OF BLOOD/URIC ACID: CPT

## 2021-02-22 PROCEDURE — 71045 X-RAY EXAM CHEST 1 VIEW: CPT

## 2021-02-22 PROCEDURE — 88333 PATH CONSLTJ SURG CYTO XM 1: CPT

## 2021-02-22 PROCEDURE — P9011: CPT

## 2021-02-22 PROCEDURE — 85652 RBC SED RATE AUTOMATED: CPT

## 2021-02-22 PROCEDURE — 36415 COLL VENOUS BLD VENIPUNCTURE: CPT

## 2021-02-22 PROCEDURE — 85027 COMPLETE CBC AUTOMATED: CPT

## 2021-02-22 PROCEDURE — A9541: CPT

## 2021-02-22 PROCEDURE — 82803 BLOOD GASES ANY COMBINATION: CPT

## 2021-02-22 PROCEDURE — 94003 VENT MGMT INPAT SUBQ DAY: CPT

## 2021-02-22 PROCEDURE — 86850 RBC ANTIBODY SCREEN: CPT

## 2021-02-22 PROCEDURE — P9059: CPT

## 2021-02-22 PROCEDURE — 82550 ASSAY OF CK (CPK): CPT

## 2021-02-22 PROCEDURE — 85384 FIBRINOGEN ACTIVITY: CPT

## 2021-02-22 PROCEDURE — P9037: CPT

## 2021-02-22 PROCEDURE — 86985 SPLIT BLOOD OR PRODUCTS: CPT

## 2021-02-22 PROCEDURE — 82962 GLUCOSE BLOOD TEST: CPT

## 2021-02-22 PROCEDURE — 83970 ASSAY OF PARATHORMONE: CPT

## 2021-02-22 PROCEDURE — 86965 POOLING BLOOD PLATELETS: CPT

## 2021-02-22 PROCEDURE — 81025 URINE PREGNANCY TEST: CPT

## 2021-02-22 PROCEDURE — 94002 VENT MGMT INPAT INIT DAY: CPT

## 2021-02-22 PROCEDURE — 80202 ASSAY OF VANCOMYCIN: CPT

## 2021-02-22 PROCEDURE — 93041 RHYTHM ECG TRACING: CPT

## 2021-02-22 PROCEDURE — 86140 C-REACTIVE PROTEIN: CPT

## 2021-02-22 PROCEDURE — 82728 ASSAY OF FERRITIN: CPT

## 2021-02-22 PROCEDURE — P9017: CPT

## 2021-02-22 PROCEDURE — 84145 PROCALCITONIN (PCT): CPT

## 2021-02-22 PROCEDURE — P9047: CPT

## 2021-02-22 RX ORDER — POTASSIUM CHLORIDE 20 MEQ
10 PACKET (EA) ORAL
Refills: 0 | Status: DISCONTINUED | OUTPATIENT
Start: 2021-02-22 | End: 2021-02-22

## 2021-02-22 RX ORDER — POTASSIUM CHLORIDE 20 MEQ
20 PACKET (EA) ORAL
Refills: 0 | Status: DISCONTINUED | OUTPATIENT
Start: 2021-02-22 | End: 2021-02-22

## 2021-02-22 RX ORDER — MAGNESIUM SULFATE 500 MG/ML
2 VIAL (ML) INJECTION ONCE
Refills: 0 | Status: COMPLETED | OUTPATIENT
Start: 2021-02-22 | End: 2021-02-22

## 2021-02-22 RX ORDER — ALBUMIN HUMAN 25 %
100 VIAL (ML) INTRAVENOUS ONCE
Refills: 0 | Status: DISCONTINUED | OUTPATIENT
Start: 2021-02-22 | End: 2021-02-22

## 2021-02-22 RX ADMIN — EPINEPHRINE 311 MICROGRAM(S)/KG/MIN: 0.3 INJECTION INTRAMUSCULAR; SUBCUTANEOUS at 02:19

## 2021-02-22 RX ADMIN — Medication 389 MICROGRAM(S)/KG/MIN: at 02:19

## 2021-02-22 RX ADMIN — Medication 389 MICROGRAM(S)/KG/MIN: at 01:41

## 2021-02-22 RX ADMIN — EPINEPHRINE 311 MICROGRAM(S)/KG/MIN: 0.3 INJECTION INTRAMUSCULAR; SUBCUTANEOUS at 01:41

## 2021-02-22 RX ADMIN — Medication 389 MICROGRAM(S)/KG/MIN: at 00:12

## 2021-02-22 RX ADMIN — EPINEPHRINE 311 MICROGRAM(S)/KG/MIN: 0.3 INJECTION INTRAMUSCULAR; SUBCUTANEOUS at 00:12

## 2021-02-22 RX ADMIN — Medication 100 MILLIEQUIVALENT(S): at 03:15

## 2021-02-22 RX ADMIN — Medication 50 GRAM(S): at 01:14

## 2021-02-22 NOTE — DISCHARGE NOTE FOR THE EXPIRED PATIENT - HOSPITAL COURSE
46 yo female with hx of HTN, breast CA (dx in 7/2020) s/p chemo (completed in 10/2020), DVt  LIJ(8/2020 on eliquis)  was admitted to Surgical unit for elective for  right breast total mastectomy with preop needle localization and right breast sentinel node bx, left breast prophylactic total mastectomy with left breast preop needle localization on 2/17/2021. Post OR pt was RRT on 2/17 night for profuse diaphoresis and lightheadedness and hypotension. Pt was given IVF. On 2/18 her Hb in am dropped to 8 from 11 and pt was transfused 2 RBCs .Around 19:45 pt was RRT as pt is found unresponsive and  then went pulseless. Code blue was called and CPR was initiated. R femoral CVC was placed. ROSC was achieved after 8 mins( 3 rounds of Epi+ sod bicarb x1, D50 x 1, rhythm was asystole) . Pt was profusely bleeding in the ALEJANDRO drains( 500 cc in total from 7am-7pm shift).  CBC returned with hemoglobin 5.1. Decision was therefore made to take the patient to the operating room to pursue active bleeding. Patient taken to OR, the left mastectomy site was opened. Two small pumping vessels were found and ligated. Patient appeared to be in DIC and had blood oozing from bare surfaces including around her central line. The surgical site was promptly closed after confirmation of no continued bleeding. She was taken to ICU for continued resuscitation. Pt was taken to the OR again on 2/19 Hematomas of L and R breast were evacuated. No fresh bleeding identified, only oozing. EEG was done which showed There is severe generalized diffuse cerebral dysfunction that is nonspecific for etiology but consistent with h/o diffuse anoxic injury. Pt was transfused with a total of 16PRBC, 16FFP and 3 PLTs.     46 yo female with hx of HTN, breast CA (dx in 7/2020) s/p chemo (completed in 10/2020), DVt  LIJ(8/2020 on eliquis)  was admitted to Surgical unit for elective for  right breast total mastectomy with preop needle localization and right breast sentinel node bx, left breast prophylactic total mastectomy with left breast preop needle localization on 2/17/2021. Post OR pt was RRT on 2/17 night for profuse diaphoresis and lightheadedness and hypotension. Pt was given IVF. On 2/18 her Hb in am dropped to 8 from 11 and pt was transfused 2 RBCs .Around 19:45 pt was RRT as pt is found unresponsive and  then went pulseless. Code blue was called and CPR was initiated. R femoral CVC was placed. ROSC was achieved after 8 mins( 3 rounds of Epi+ sod bicarb x1, D50 x 1, rhythm was asystole) . Pt was profusely bleeding in the ALEJANDRO drains( 500 cc in total from 7am-7pm shift).  CBC returned with hemoglobin 5.1. Decision was therefore made to take the patient to the operating room to pursue active bleeding. Patient taken to OR, the left mastectomy site was opened. Two small pumping vessels were found and ligated. Patient appeared to be in DIC and had blood oozing from bare surfaces including around her central line. The surgical site was promptly closed after confirmation of no continued bleeding. She was taken to ICU for continued resuscitation. Pt was taken to the OR again on 2/19 Hematomas of L and R breast were evacuated. No fresh bleeding identified, only oozing. EEG was done which showed There is severe generalized diffuse cerebral dysfunction that is nonspecific for etiology but consistent with h/o diffuse anoxic injury. Pt was transfused with a total of 16PRBC, 16FFP and 3 PLTs. Pt continues to decline every day with increasing vasopressor requirements, continued to be acidotic so was continued on bicarb drip throughout. On 2/21/21 pt was maxed on 4 pressors but still continues to be hypotensive and acidotic.   Pt was code blue at 3: 52 am. CPR was initiated . Pt was given 5 rounds of Epi, 2 Bicarb, 2 ca gluconate and 1 D 50 . Rhythm was asystole throughout. Pt was pronounced at 4:06 AM .   family was notified   Case was reported to [facility] but they refused stating therapeutic complications are no longer accepted by [facility] .      46 yo female with hx of HTN, breast CA (dx in 7/2020) s/p chemo (completed in 10/2020), DVt  LIJ(8/2020 on eliquis)  was admitted to Surgical unit for elective for right breast total mastectomy with preop needle localization and right breast sentinel node bx, left breast prophylactic total mastectomy with left breast preop needle localization on 2/17/2021. Post OR pt was RRT on 2/17 night for profuse diaphoresis and lightheadedness and hypotension. Pt was given IVF. On 2/18 her Hb in am dropped to 8 from 11 and pt was transfused 2 RBCs .Around 19:45 pt was RRT as pt is found unresponsive and  then went pulseless. Code blue was called and CPR was initiated. R femoral CVC was placed. ROSC was achieved after 8 mins( 3 rounds of Epi+ sod bicarb x1, D50 x 1, rhythm was asystole) . Pt was profusely bleeding in the ALEJANDRO drains( 500 cc in total from 7am-7pm shift).  CBC returned with hemoglobin 5.1. Decision was therefore made to take the patient to the operating room to pursue active bleeding. Patient taken to OR, the left mastectomy site was opened. Two small pumping vessels were found and ligated. Patient appeared to be in DIC and had blood oozing from bare surfaces including around her central line. The surgical site was promptly closed after confirmation of no continued bleeding. She was taken to ICU for continued resuscitation. Pt was taken to the OR again on 2/19 Hematomas of L and R breast were evacuated. No fresh bleeding identified, only oozing. EEG was done which showed There is severe generalized diffuse cerebral dysfunction that is nonspecific for etiology but consistent with h/o diffuse anoxic injury. Pt was transfused with a total of 16PRBC, 16FFP and 3 PLTs. Pt continues to decline every day with increasing vasopressor requirements, continued to be acidotic so was continued on bicarb drip throughout. On 2/21/21 pt was maxed on 4 pressors but still continues to be hypotensive and acidotic.   Pt was code blue at 3: 52 am. CPR was initiated . Pt was given 5 rounds of Epi, 2 Bicarb, 2 ca gluconate and 1 D 50 . Rhythm was asystole throughout. Pt was pronounced at 4:06 AM .   family was notified   Case was reported to [facility] but they refused stating therapeutic complications are no longer accepted by [facility] .

## 2021-02-22 NOTE — DISCHARGE NOTE FOR THE EXPIRED PATIENT - NS PRELIMINARY CAUSE OF DEATH_RESTRICTED
Cancer/Cardiopulmonary Arrest/Multi-organ Dysfunction Syndrome/Renal Failure Cancer/Cardiopulmonary Arrest/Multi-organ Dysfunction Syndrome/Renal Failure/Other:

## 2021-02-23 LAB — SURGICAL PATHOLOGY STUDY: SIGNIFICANT CHANGE UP

## 2021-02-26 ENCOUNTER — RESULT REVIEW (OUTPATIENT)
Age: 46
End: 2021-02-26

## 2022-06-06 NOTE — CONSULT NOTE ADULT - ASSESSMENT
well developed, well nourished , in no acute distress , ambulating without difficulty , normal communication ability 45-yo F w/ PMH of R breast CA on doxorubicin/cyclophosphamide (last session 8/6/20, Neulasta on 8/7/20), and HTN, admitted for L-side neck pain found to have extensive LIJ DVT currently on heparin, consulted for possible infectious etiology.

## 2022-07-05 NOTE — DISCHARGE NOTE FOR THE EXPIRED PATIENT - OTHER
Detail Level: Detailed Depth Of Biopsy: dermis Was A Bandage Applied: Yes Size Of Lesion In Cm: 0 Biopsy Type: H and E Biopsy Method: Dermablade Hemorrhagic Shock Anesthesia Type: 1% lidocaine with epinephrine Anesthesia Volume In Cc (Will Not Render If 0): 0.5 Hemostasis: Drysol Wound Care: Petrolatum Dressing: bandage Destruction After The Procedure: No Type Of Destruction Used: Curettage Curettage Text: The wound bed was treated with curettage after the biopsy was performed. Cryotherapy Text: The wound bed was treated with cryotherapy after the biopsy was performed. Electrodesiccation Text: The wound bed was treated with electrodesiccation after the biopsy was performed. Electrodesiccation And Curettage Text: The wound bed was treated with electrodesiccation and curettage after the biopsy was performed. Silver Nitrate Text: The wound bed was treated with silver nitrate after the biopsy was performed. Lab: [id number] Lab Facility: [id number] Consent: Written consent was obtained and risks were reviewed including but not limited to scarring, infection, bleeding, scabbing, incomplete removal, nerve damage and allergy to anesthesia. Post-Care Instructions: I reviewed with the patient in detail post-care instructions. Patient is to keep the biopsy site dry overnight, and then apply bacitracin twice daily until healed. Patient may apply hydrogen peroxide soaks to remove any crusting. Notification Instructions: Patient will be notified of biopsy results. However, patient instructed to call the office if not contacted within 2 weeks. Billing Type: Third-Party Bill Information: Selecting Yes will display possible errors in your note based on the variables you have selected. This validation is only offered as a suggestion for you. PLEASE NOTE THAT THE VALIDATION TEXT WILL BE REMOVED WHEN YOU FINALIZE YOUR NOTE. IF YOU WANT TO FAX A PRELIMINARY NOTE YOU WILL NEED TO TOGGLE THIS TO 'NO' IF YOU DO NOT WANT IT IN YOUR FAXED NOTE.

## 2022-10-28 NOTE — CONSULT NOTE ADULT - ASSESSMENT
-- DO NOT REPLY / DO NOT REPLY ALL --  -- Message is from [facility]agement [facility] Operations ([facility]O) --    General Patient Message Mother is looking to get a referral for outpatient mental health specialist. Please call to advise, can leave a detail message if voicemail is reached    Caller Information       Type Contact Phone/Fax    10/28/2022 07:31 AM CDT Phone (Incoming) JODY KIMBLE (Mother) [phone] (M)        Alternative phone number: NA    Can a detailed message be left? Yes    Message Turnaround:     Is it Working Hours? No - After Hours/Weekend/Holiday     Did the caller agree that this message can wait until the office reopens in the morning? YES - The Message Can Wait - Send a message to the provider's clinical support pool     [place]:    Please give this turnaround time to the caller:   \"This message will be sent to [state Provider's name]. The clinical team will fulfill your request as soon as they review your message.\"                 ASSESSMENT: Patient is a 45y old f with recently diagnosed breast CA (treated through mediport in L chest with chemotherapy) with acute thrombus of Left IJ.    PLAN:   - initiate heparin drip   - admit to medicine for AC and possible thrombolysis  - am IR consult for catheter directed therapy options  - vascular will follow - no immediate surgery indicated  - Patient discussed with vascular fellow    Vascular Surgery #[id number] ASSESSMENT: Patient is a 45y old f with recently diagnosed breast CA (treated through mediport in L chest with chemotherapy) with provoked acute thrombus of Left IJ.    PLAN:   - initiate heparin drip   - admit to medicine for AC  - duplex of L neck/IJ  - vascular will follow   - no immediate surgery indicated  - Patient discussed with vascular fellow    Vascular Surgery #[id number]

## 2022-11-22 NOTE — ASU PATIENT PROFILE, ADULT - AS SC BRADEN SENSORY
(4) no impairment Xeljanz Counseling: I discussed with the patient the risks of Xeljanz therapy including increased risk of infection, liver issues, headache, diarrhea, or cold symptoms. Live vaccines should be avoided. They were instructed to call if they have any problems.

## 2023-04-08 NOTE — H&P PST ADULT - PRIMARY CARE PROVIDER
COVID-19, mRNA, LNP-S, PF, 100 mcg/ 0.5 mL dose (Moderna); 05-Jan-2022 14:11; Catherine Crawley (RN); Modern[employer] [id number] (Exp. Date: 12-Jan-2022); IntraMuscular; Deltoid Left.; 0.25 milliLiter(s);   influenza, injectable, quadrivalent, preservative free; 11-Oct-2017 11:36; Denia Duron (RN); [employer]; 572kt; IntraMuscular; Deltoid Left.; 0.5 milliLiter(s); VIS (VIS Published: 07-Aug-2015, VIS Presented: 11-Oct-2017);    Dr. Brendon Khan ([facility]) [phone]

## 2024-04-29 RX ORDER — AMLODIPINE BESYLATE 2.5 MG/1
1 TABLET ORAL
Qty: 0 | Refills: 0 | DISCHARGE

## 2024-04-29 RX ORDER — APIXABAN 2.5 MG/1
1 TABLET, FILM COATED ORAL
Qty: 0 | Refills: 0 | DISCHARGE

## 2024-04-29 RX ORDER — ONDANSETRON 8 MG/1
1 TABLET, FILM COATED ORAL
Qty: 0 | Refills: 0 | DISCHARGE

## 2024-04-29 RX ORDER — ENOXAPARIN SODIUM 100 MG/ML
0 INJECTION SUBCUTANEOUS
Qty: 0 | Refills: 0 | DISCHARGE

## 2024-04-29 RX ORDER — IBUPROFEN 200 MG
1 TABLET ORAL
Qty: 0 | Refills: 0 | DISCHARGE

## 2024-06-14 NOTE — H&P PST ADULT - NSALCOHOLUSECOMMENT_GEN_ALL_CORE_FT
Patient states she went to PCP and had labs drawn and was called today and told to come to ED because she is in renal failure. Patient reports pain in her lower back, legs and abdomen. Patient denies N/V and denies CP and SOB. Patient unable to walk d/t leg pain no acute distress otherwise.   
denies alcohol use

## 2024-10-16 NOTE — H&P ADULT - HISTORY OF PRESENT ILLNESS
Lon from [facility] wants to revisit patient at 430 to ensure teaching.   [facility] to deliver Vancomycin to patient for tomorrows dose. [facility] notified that she will be discharging today, will see 10/17. Patient clear from case management- family will be transporting.      10/16/24 1402   Final Note   Assessment Type Final Discharge Note   Anticipated Discharge Disposition Home-Health   Post-Acute Status   Post-Acute Authorization IV Infusion;Home Health   Home Health Status Set-up Complete/Auth obtained        45F with PMH of recently diagnosed R breast cancer (started on chemo Doxorubicin/cyclophosphamide in July, last session 8/6/20 via L chest wall mediport, Neulasta on 8/7/20) and HTN p/w L side neck pain and fullness x 5 days. Pt states she started noticing some L neck fullness 5 days ago, followed by pain and then over the weekend noted significant swelling. Swelling and pain have been getting progressively worse since that time; pain is pulling sensation, 10/10 at its worst, sometimes radiates to the back of her head and L sided temple, worse with movement of her head (abena towards th R). Denies any associated fevers, chills, inability to handle secretions, no SOB, no wheezing, no stridor, no CP, no erythema/swelling/pain at L mediport site. Endorses nausea/vomiting x 2 episode this past weekend, without associated abdominal pain; +diarrhea since chemo. On day of admission, pt also started noticing some L sided pain on swallowing and pain was more severe than prior, prompting ED visit. Denies any recent sick contact or recent illness, denies any preceeding throat infections, has poor dental hygiene.

## 2025-02-14 NOTE — ASU PREOP CHECKLIST - HEIGHT IN INCHES
-- DO NOT REPLY / DO NOT REPLY ALL --  -- This inbox is not monitored. If this was sent to the wrong provider or department, reroute message to P ECO Reroute pool. --  -- Message is from [facility]agement Center Operations ([facility]O) --      Message Type:  Refill Medication   Refill request for Pended medication named:   metoPROLOL tartrate (LOPRESSOR) 50 MG tablet   lisinopril (ZESTRIL) 2.5 MG tablet   levothyroxine 25 MCG tablet     Preferred pharmacy verified, and selected.   [facility] - [place], [place] - [address] [place] [address]    Is the patient OUT of Medication?  No Medication Refills handled by Care Site - route as routine priority to care site pool on KB. Patient has been advised it will be addressed within 2-3 business days.     Message: NA                   4

## 2025-04-04 NOTE — PATIENT PROFILE ADULT - DOES PATIENT HAVE ADVANCE DIRECTIVE
There is a virus going around causing these symptoms currently. It can take a couple weeks for the redness to go away.    Augusta Ricks MD  4/4/25   Yes